# Patient Record
Sex: FEMALE | Race: WHITE | HISPANIC OR LATINO | URBAN - METROPOLITAN AREA
[De-identification: names, ages, dates, MRNs, and addresses within clinical notes are randomized per-mention and may not be internally consistent; named-entity substitution may affect disease eponyms.]

---

## 2017-05-24 ENCOUNTER — INPATIENT (INPATIENT)
Facility: HOSPITAL | Age: 37
LOS: 2 days | Discharge: HOME | DRG: 872 | End: 2017-05-27
Attending: INTERNAL MEDICINE | Admitting: INTERNAL MEDICINE
Payer: SELF-PAY

## 2017-05-24 VITALS
OXYGEN SATURATION: 98 % | HEIGHT: 67 IN | WEIGHT: 244.93 LBS | SYSTOLIC BLOOD PRESSURE: 131 MMHG | TEMPERATURE: 100 F | RESPIRATION RATE: 20 BRPM | DIASTOLIC BLOOD PRESSURE: 79 MMHG | HEART RATE: 116 BPM

## 2017-05-24 DIAGNOSIS — E66.01 MORBID (SEVERE) OBESITY DUE TO EXCESS CALORIES: ICD-10-CM

## 2017-05-24 DIAGNOSIS — N76.0 ACUTE VAGINITIS: ICD-10-CM

## 2017-05-24 DIAGNOSIS — Z29.9 ENCOUNTER FOR PROPHYLACTIC MEASURES, UNSPECIFIED: ICD-10-CM

## 2017-05-24 DIAGNOSIS — Z71.3 DIETARY COUNSELING AND SURVEILLANCE: ICD-10-CM

## 2017-05-24 DIAGNOSIS — A41.9 SEPSIS, UNSPECIFIED ORGANISM: ICD-10-CM

## 2017-05-24 DIAGNOSIS — N39.0 URINARY TRACT INFECTION, SITE NOT SPECIFIED: ICD-10-CM

## 2017-05-24 DIAGNOSIS — Z79.52 LONG TERM (CURRENT) USE OF SYSTEMIC STEROIDS: ICD-10-CM

## 2017-05-24 DIAGNOSIS — F17.210 NICOTINE DEPENDENCE, CIGARETTES, UNCOMPLICATED: ICD-10-CM

## 2017-05-24 DIAGNOSIS — R73.9 HYPERGLYCEMIA, UNSPECIFIED: ICD-10-CM

## 2017-05-24 DIAGNOSIS — I10 ESSENTIAL (PRIMARY) HYPERTENSION: ICD-10-CM

## 2017-05-24 DIAGNOSIS — A41.89 OTHER SPECIFIED SEPSIS: ICD-10-CM

## 2017-05-24 DIAGNOSIS — Z79.899 OTHER LONG TERM (CURRENT) DRUG THERAPY: ICD-10-CM

## 2017-05-24 DIAGNOSIS — R50.9 FEVER, UNSPECIFIED: ICD-10-CM

## 2017-05-24 DIAGNOSIS — E11.65 TYPE 2 DIABETES MELLITUS WITH HYPERGLYCEMIA: ICD-10-CM

## 2017-05-24 DIAGNOSIS — L73.2 HIDRADENITIS SUPPURATIVA: ICD-10-CM

## 2017-05-24 DIAGNOSIS — Z94.0 KIDNEY TRANSPLANT STATUS: ICD-10-CM

## 2017-05-24 DIAGNOSIS — E86.0 DEHYDRATION: ICD-10-CM

## 2017-05-24 DIAGNOSIS — Z71.6 TOBACCO ABUSE COUNSELING: ICD-10-CM

## 2017-05-24 DIAGNOSIS — N17.9 ACUTE KIDNEY FAILURE, UNSPECIFIED: ICD-10-CM

## 2017-05-24 DIAGNOSIS — F41.9 ANXIETY DISORDER, UNSPECIFIED: ICD-10-CM

## 2017-05-24 DIAGNOSIS — L03.111 CELLULITIS OF RIGHT AXILLA: ICD-10-CM

## 2017-05-24 LAB
ALBUMIN SERPL ELPH-MCNC: 2.8 G/DL — LOW (ref 3.5–5)
ALP SERPL-CCNC: 115 U/L — SIGNIFICANT CHANGE UP (ref 40–120)
ALT FLD-CCNC: 19 U/L DA — SIGNIFICANT CHANGE UP (ref 10–60)
ANION GAP SERPL CALC-SCNC: 9 MMOL/L — SIGNIFICANT CHANGE UP (ref 5–17)
APPEARANCE UR: CLEAR — SIGNIFICANT CHANGE UP
AST SERPL-CCNC: 10 U/L — SIGNIFICANT CHANGE UP (ref 10–40)
BILIRUB SERPL-MCNC: 0.4 MG/DL — SIGNIFICANT CHANGE UP (ref 0.2–1.2)
BILIRUB UR-MCNC: NEGATIVE — SIGNIFICANT CHANGE UP
BUN SERPL-MCNC: 9 MG/DL — SIGNIFICANT CHANGE UP (ref 7–18)
CALCIUM SERPL-MCNC: 9 MG/DL — SIGNIFICANT CHANGE UP (ref 8.4–10.5)
CHLORIDE SERPL-SCNC: 101 MMOL/L — SIGNIFICANT CHANGE UP (ref 96–108)
CO2 SERPL-SCNC: 26 MMOL/L — SIGNIFICANT CHANGE UP (ref 22–31)
COLOR SPEC: YELLOW — SIGNIFICANT CHANGE UP
CREAT SERPL-MCNC: 1.5 MG/DL — HIGH (ref 0.5–1.3)
DIFF PNL FLD: ABNORMAL
GLUCOSE SERPL-MCNC: 455 MG/DL — CRITICAL HIGH (ref 70–99)
GLUCOSE UR QL: 1000 MG/DL
HCG UR QL: NEGATIVE — SIGNIFICANT CHANGE UP
HCT VFR BLD CALC: 42.6 % — SIGNIFICANT CHANGE UP (ref 34.5–45)
HGB BLD-MCNC: 13.5 G/DL — SIGNIFICANT CHANGE UP (ref 11.5–15.5)
KETONES UR-MCNC: NEGATIVE — SIGNIFICANT CHANGE UP
LEUKOCYTE ESTERASE UR-ACNC: ABNORMAL
MCHC RBC-ENTMCNC: 27.8 PG — SIGNIFICANT CHANGE UP (ref 27–34)
MCHC RBC-ENTMCNC: 31.6 GM/DL — LOW (ref 32–36)
MCV RBC AUTO: 87.8 FL — SIGNIFICANT CHANGE UP (ref 80–100)
NITRITE UR-MCNC: NEGATIVE — SIGNIFICANT CHANGE UP
PH UR: 6 — SIGNIFICANT CHANGE UP (ref 5–8)
PLATELET # BLD AUTO: 286 K/UL — SIGNIFICANT CHANGE UP (ref 150–400)
POTASSIUM SERPL-MCNC: 3.7 MMOL/L — SIGNIFICANT CHANGE UP (ref 3.5–5.3)
POTASSIUM SERPL-SCNC: 3.7 MMOL/L — SIGNIFICANT CHANGE UP (ref 3.5–5.3)
PROT SERPL-MCNC: 7.9 G/DL — SIGNIFICANT CHANGE UP (ref 6–8.3)
PROT UR-MCNC: 30 MG/DL
RBC # BLD: 4.85 M/UL — SIGNIFICANT CHANGE UP (ref 3.8–5.2)
RBC # FLD: 12.5 % — SIGNIFICANT CHANGE UP (ref 10.3–14.5)
SODIUM SERPL-SCNC: 136 MMOL/L — SIGNIFICANT CHANGE UP (ref 135–145)
SP GR SPEC: 1.01 — SIGNIFICANT CHANGE UP (ref 1.01–1.02)
UROBILINOGEN FLD QL: NEGATIVE — SIGNIFICANT CHANGE UP
WBC # BLD: 14.5 K/UL — HIGH (ref 3.8–10.5)
WBC # FLD AUTO: 14.5 K/UL — HIGH (ref 3.8–10.5)

## 2017-05-24 PROCEDURE — 71020: CPT | Mod: 26

## 2017-05-24 PROCEDURE — 99285 EMERGENCY DEPT VISIT HI MDM: CPT

## 2017-05-24 RX ORDER — SODIUM CHLORIDE 9 MG/ML
1000 INJECTION INTRAMUSCULAR; INTRAVENOUS; SUBCUTANEOUS
Qty: 0 | Refills: 0 | Status: DISCONTINUED | OUTPATIENT
Start: 2017-05-24 | End: 2017-05-27

## 2017-05-24 RX ORDER — CEFTRIAXONE 500 MG/1
1 INJECTION, POWDER, FOR SOLUTION INTRAMUSCULAR; INTRAVENOUS ONCE
Qty: 0 | Refills: 0 | Status: COMPLETED | OUTPATIENT
Start: 2017-05-24 | End: 2017-05-24

## 2017-05-24 RX ORDER — TACROLIMUS 5 MG/1
3 CAPSULE ORAL EVERY 12 HOURS
Qty: 0 | Refills: 0 | Status: DISCONTINUED | OUTPATIENT
Start: 2017-05-24 | End: 2017-05-27

## 2017-05-24 RX ORDER — SODIUM CHLORIDE 9 MG/ML
1000 INJECTION INTRAMUSCULAR; INTRAVENOUS; SUBCUTANEOUS ONCE
Qty: 0 | Refills: 0 | Status: COMPLETED | OUTPATIENT
Start: 2017-05-24 | End: 2017-05-24

## 2017-05-24 RX ORDER — DEXTROSE 50 % IN WATER 50 %
25 SYRINGE (ML) INTRAVENOUS ONCE
Qty: 0 | Refills: 0 | Status: DISCONTINUED | OUTPATIENT
Start: 2017-05-24 | End: 2017-05-27

## 2017-05-24 RX ORDER — GLUCAGON INJECTION, SOLUTION 0.5 MG/.1ML
1 INJECTION, SOLUTION SUBCUTANEOUS ONCE
Qty: 0 | Refills: 0 | Status: DISCONTINUED | OUTPATIENT
Start: 2017-05-24 | End: 2017-05-27

## 2017-05-24 RX ORDER — KETOROLAC TROMETHAMINE 30 MG/ML
30 SYRINGE (ML) INJECTION ONCE
Qty: 0 | Refills: 0 | Status: DISCONTINUED | OUTPATIENT
Start: 2017-05-24 | End: 2017-05-24

## 2017-05-24 RX ORDER — METOCLOPRAMIDE HCL 10 MG
10 TABLET ORAL ONCE
Qty: 0 | Refills: 0 | Status: COMPLETED | OUTPATIENT
Start: 2017-05-24 | End: 2017-05-24

## 2017-05-24 RX ORDER — SODIUM CHLORIDE 9 MG/ML
1000 INJECTION, SOLUTION INTRAVENOUS
Qty: 0 | Refills: 0 | Status: DISCONTINUED | OUTPATIENT
Start: 2017-05-24 | End: 2017-05-27

## 2017-05-24 RX ORDER — DEXTROSE 50 % IN WATER 50 %
1 SYRINGE (ML) INTRAVENOUS ONCE
Qty: 0 | Refills: 0 | Status: DISCONTINUED | OUTPATIENT
Start: 2017-05-24 | End: 2017-05-27

## 2017-05-24 RX ORDER — CEFTRIAXONE 500 MG/1
1 INJECTION, POWDER, FOR SOLUTION INTRAMUSCULAR; INTRAVENOUS EVERY 24 HOURS
Qty: 0 | Refills: 0 | Status: DISCONTINUED | OUTPATIENT
Start: 2017-05-24 | End: 2017-05-27

## 2017-05-24 RX ORDER — DEXTROSE 50 % IN WATER 50 %
12.5 SYRINGE (ML) INTRAVENOUS ONCE
Qty: 0 | Refills: 0 | Status: DISCONTINUED | OUTPATIENT
Start: 2017-05-24 | End: 2017-05-27

## 2017-05-24 RX ORDER — INSULIN HUMAN 100 [IU]/ML
5 INJECTION, SOLUTION SUBCUTANEOUS ONCE
Qty: 0 | Refills: 0 | Status: COMPLETED | OUTPATIENT
Start: 2017-05-24 | End: 2017-05-24

## 2017-05-24 RX ORDER — INSULIN LISPRO 100/ML
VIAL (ML) SUBCUTANEOUS
Qty: 0 | Refills: 0 | Status: DISCONTINUED | OUTPATIENT
Start: 2017-05-24 | End: 2017-05-27

## 2017-05-24 RX ADMIN — Medication 10 MILLIGRAM(S): at 21:54

## 2017-05-24 RX ADMIN — Medication 30 MILLIGRAM(S): at 20:20

## 2017-05-24 RX ADMIN — CEFTRIAXONE 100 GRAM(S): 500 INJECTION, POWDER, FOR SOLUTION INTRAMUSCULAR; INTRAVENOUS at 21:55

## 2017-05-24 RX ADMIN — SODIUM CHLORIDE 4000 MILLILITER(S): 9 INJECTION INTRAMUSCULAR; INTRAVENOUS; SUBCUTANEOUS at 20:20

## 2017-05-24 RX ADMIN — SODIUM CHLORIDE 4000 MILLILITER(S): 9 INJECTION INTRAMUSCULAR; INTRAVENOUS; SUBCUTANEOUS at 21:55

## 2017-05-24 RX ADMIN — INSULIN HUMAN 5 UNIT(S): 100 INJECTION, SOLUTION SUBCUTANEOUS at 22:01

## 2017-05-24 NOTE — H&P ADULT - ATTENDING COMMENTS
Patient seen/evaluated at bedside. I agree with the resident progress note/outlined plan of care. My independent findings and conclusions are documented.

## 2017-05-24 NOTE — H&P ADULT - NEGATIVE GENERAL GENITOURINARY SYMPTOMS
no flank pain L/no incontinence/no hematuria/no renal colic/no nocturia/no bladder infections/no urine discoloration/no flank pain R/no gas in urine/no urinary hesitancy/normal urinary frequency

## 2017-05-24 NOTE — ED ADULT TRIAGE NOTE - CHIEF COMPLAINT QUOTE
pt c/o having a fever that started monday night, and pt thinks she has a uti and bodyache, headache. Pt has history of right kidney transplant.

## 2017-05-24 NOTE — ED PROVIDER NOTE - CONDUCTED A DETAILED DISCUSSION WITH PATIENT AND/OR GUARDIAN REGARDING, MDM
return to ED if symptoms worsen, persist or questions arise lab results/radiology results/need to admit

## 2017-05-24 NOTE — ED PROVIDER NOTE - MEDICAL DECISION MAKING DETAILS
kidney transplant in 1999, uti today, new onset diabetes likely steroid induced, dehydration, kristin cr of 1.5 from baseline normal, no pmd to follow up with, will admit for abx and further mgmt. gave cftx for uti, 2L NS, toradol for body aches. pt feels better. agrees with admission

## 2017-05-24 NOTE — ED PROVIDER NOTE - OBJECTIVE STATEMENT
37 y/o F w/ PMHx of Kidney Transplant (Sept 20th, 1999) presents to the ED c/o constant fever x3 days. Pt also notes intermittent HA for several months. Pt states she has recently switched jobs & has had to change insurance providers & so was unable to obtain High Blood Pressure meds & has stopped taking her meds. Pt is on Nifedipine 60 mg, Prograf 0.1 mg 3x per day, & Prednisone 5m 1x per day. Pt denies nausea, vomiting, diarrhea or any other complaints. Pharmacy: [facility], [id number]. NKDA. 35 y/o F w/ PMHx of Kidney Transplant (Sept 20th, 1999), had kidney scarring diagnosed at age 13, presents to the ED c/o constant fever x3 days, dysuria and frequency. Pt also notes intermittent HA for several months. Pt states she has recently switched jobs & has had to change insurance providers & so was unable to obtain Blood Pressure meds since one week ago. Pt is on Nifedipine 60 mg. She has her other meds which include Prograf 0.1 mg 3x per day, & Prednisone 5m 1x per day. Pt denies nausea, vomiting, diarrhea or sob. Pharmacy: [facility], [id number]. NKDA. She is menstruating now so gave heads up that there might be blood in urine. 37 y/o F w/ PMHx of Kidney Transplant (Sept 20th, 1999), had kidney scarring diagnosed at age 13, presents to the ED c/o constant fever x3 days, dysuria and frequency. Pt also notes intermittent HA for several months. Pt states she has recently switched jobs & has had to change insurance providers & so was unable to obtain Blood Pressure meds since one week ago. Pt is on Nifedipine 60 mg. She has her other meds which include Prograf 1 mg 3x per day, & Prednisone 5m 1x per day. Pt denies nausea, vomiting, diarrhea or sob. Pharmacy: [facility], [id number]. NKDA. She is menstruating now so gave heads up that there might be blood in urine.

## 2017-05-24 NOTE — H&P ADULT - HISTORY OF PRESENT ILLNESS
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 patient states she was in her usual state of health 3 days ago when she noticed chills , she took her temperture and it was noted to be 103 , she took some tylenol , but the fever persisted for the nest 2 dyas ranging from 101-103 F . Patient also reports increased frequency of micturation and burning when she urinates for the past 3 days , denies any flank pain or foul smelling urine . Also reports intermittent frontal headaches for past 3 months , she states its " migraine" from the stress of changing jobs . Pt denies nausea, vomiting, diarrhea or sob. ROS is negative excpet above.    In the ER patient's VSS t 99.8 , hr 116 , bp 131/69 , rr 20 pulse ox of 98 , patient was found to have positive ua . wbc count of 14.5 , BUN/cr 9/1.55 , bsl of 455 ( no anion gap) got 2 liet bolus and 5 units insulin with repeat bsl of 165 , patient also got one dose of rocephin in the ER.

## 2017-05-24 NOTE — H&P ADULT - ASSESSMENT
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

## 2017-05-24 NOTE — ED PROVIDER NOTE - NS ED MD SCRIBE ATTENDING SCRIBE SECTIONS
HISTORY OF PRESENT ILLNESS/PHYSICAL EXAM/HIV/VITAL SIGNS( Pullset)/REVIEW OF SYSTEMS/DISPOSITION/PAST MEDICAL/SURGICAL/SOCIAL HISTORY

## 2017-05-24 NOTE — H&P ADULT - PROBLEM SELECTOR PLAN 2
elevated BSl  : 450 S/P 2 Lite bolus and 5 units of insulin repeat 165  c/w iss diabetic diet  f/u hemoglobin A1c

## 2017-05-25 DIAGNOSIS — N39.0 URINARY TRACT INFECTION, SITE NOT SPECIFIED: ICD-10-CM

## 2017-05-25 DIAGNOSIS — L73.2 HIDRADENITIS SUPPURATIVA: ICD-10-CM

## 2017-05-25 DIAGNOSIS — E11.9 TYPE 2 DIABETES MELLITUS WITHOUT COMPLICATIONS: ICD-10-CM

## 2017-05-25 LAB
24R-OH-CALCIDIOL SERPL-MCNC: 5.7 NG/ML — LOW (ref 30–100)
ALBUMIN SERPL ELPH-MCNC: 2.3 G/DL — LOW (ref 3.5–5)
ALP SERPL-CCNC: 96 U/L — SIGNIFICANT CHANGE UP (ref 40–120)
ALT FLD-CCNC: 17 U/L DA — SIGNIFICANT CHANGE UP (ref 10–60)
ANION GAP SERPL CALC-SCNC: 6 MMOL/L — SIGNIFICANT CHANGE UP (ref 5–17)
AST SERPL-CCNC: 10 U/L — SIGNIFICANT CHANGE UP (ref 10–40)
BASOPHILS # BLD AUTO: 0.1 K/UL — SIGNIFICANT CHANGE UP (ref 0–0.2)
BASOPHILS NFR BLD AUTO: 0.9 % — SIGNIFICANT CHANGE UP (ref 0–2)
BILIRUB SERPL-MCNC: 0.4 MG/DL — SIGNIFICANT CHANGE UP (ref 0.2–1.2)
BUN SERPL-MCNC: 6 MG/DL — LOW (ref 7–18)
CALCIUM SERPL-MCNC: 8.3 MG/DL — LOW (ref 8.4–10.5)
CHLORIDE SERPL-SCNC: 109 MMOL/L — HIGH (ref 96–108)
CHOLEST SERPL-MCNC: 180 MG/DL — SIGNIFICANT CHANGE UP (ref 10–199)
CO2 SERPL-SCNC: 25 MMOL/L — SIGNIFICANT CHANGE UP (ref 22–31)
CREAT SERPL-MCNC: 1.08 MG/DL — SIGNIFICANT CHANGE UP (ref 0.5–1.3)
EOSINOPHIL # BLD AUTO: 0.2 K/UL — SIGNIFICANT CHANGE UP (ref 0–0.5)
EOSINOPHIL NFR BLD AUTO: 2 % — SIGNIFICANT CHANGE UP (ref 0–6)
GLUCOSE SERPL-MCNC: 266 MG/DL — HIGH (ref 70–99)
HBA1C BLD-MCNC: 13.2 % — HIGH (ref 4–5.6)
HCT VFR BLD CALC: 37.6 % — SIGNIFICANT CHANGE UP (ref 34.5–45)
HDLC SERPL-MCNC: 42 MG/DL — SIGNIFICANT CHANGE UP (ref 40–125)
HGB BLD-MCNC: 12.1 G/DL — SIGNIFICANT CHANGE UP (ref 11.5–15.5)
LACTATE SERPL-SCNC: 1.5 MMOL/L — SIGNIFICANT CHANGE UP (ref 0.7–2)
LIPID PNL WITH DIRECT LDL SERPL: 102 MG/DL — SIGNIFICANT CHANGE UP
LYMPHOCYTES # BLD AUTO: 2.8 K/UL — SIGNIFICANT CHANGE UP (ref 1–3.3)
LYMPHOCYTES # BLD AUTO: 23.3 % — SIGNIFICANT CHANGE UP (ref 13–44)
MAGNESIUM SERPL-MCNC: 1.5 MG/DL — LOW (ref 1.6–2.6)
MCHC RBC-ENTMCNC: 28 PG — SIGNIFICANT CHANGE UP (ref 27–34)
MCHC RBC-ENTMCNC: 32.2 GM/DL — SIGNIFICANT CHANGE UP (ref 32–36)
MCV RBC AUTO: 87 FL — SIGNIFICANT CHANGE UP (ref 80–100)
MONOCYTES # BLD AUTO: 0.7 K/UL — SIGNIFICANT CHANGE UP (ref 0–0.9)
MONOCYTES NFR BLD AUTO: 6.1 % — SIGNIFICANT CHANGE UP (ref 2–14)
NEUTROPHILS # BLD AUTO: 8.3 K/UL — HIGH (ref 1.8–7.4)
NEUTROPHILS NFR BLD AUTO: 67.8 % — SIGNIFICANT CHANGE UP (ref 43–77)
PHOSPHATE SERPL-MCNC: 2.2 MG/DL — LOW (ref 2.5–4.5)
PLATELET # BLD AUTO: 246 K/UL — SIGNIFICANT CHANGE UP (ref 150–400)
POTASSIUM SERPL-MCNC: 3.6 MMOL/L — SIGNIFICANT CHANGE UP (ref 3.5–5.3)
POTASSIUM SERPL-SCNC: 3.6 MMOL/L — SIGNIFICANT CHANGE UP (ref 3.5–5.3)
PROCALCITONIN SERPL-MCNC: 0.2 NG/ML — HIGH (ref 0–0.05)
PROT SERPL-MCNC: 6.8 G/DL — SIGNIFICANT CHANGE UP (ref 6–8.3)
RBC # BLD: 4.33 M/UL — SIGNIFICANT CHANGE UP (ref 3.8–5.2)
RBC # FLD: 12 % — SIGNIFICANT CHANGE UP (ref 10.3–14.5)
SODIUM SERPL-SCNC: 140 MMOL/L — SIGNIFICANT CHANGE UP (ref 135–145)
TACROLIMUS SERPL-MCNC: 3.3 NG/ML — SIGNIFICANT CHANGE UP
TOTAL CHOLESTEROL/HDL RATIO MEASUREMENT: 4.3 RATIO — SIGNIFICANT CHANGE UP (ref 3.3–7.1)
TRIGL SERPL-MCNC: 180 MG/DL — HIGH (ref 10–149)
TSH SERPL-MCNC: 0.9 UU/ML — SIGNIFICANT CHANGE UP (ref 0.34–4.82)
WBC # BLD: 12.2 K/UL — HIGH (ref 3.8–10.5)
WBC # FLD AUTO: 12.2 K/UL — HIGH (ref 3.8–10.5)

## 2017-05-25 PROCEDURE — 99223 1ST HOSP IP/OBS HIGH 75: CPT | Mod: GC

## 2017-05-25 RX ORDER — INSULIN GLARGINE 100 [IU]/ML
25 INJECTION, SOLUTION SUBCUTANEOUS AT BEDTIME
Qty: 0 | Refills: 0 | Status: DISCONTINUED | OUTPATIENT
Start: 2017-05-25 | End: 2017-05-26

## 2017-05-25 RX ORDER — TRAMADOL HYDROCHLORIDE 50 MG/1
50 TABLET ORAL ONCE
Qty: 0 | Refills: 0 | Status: DISCONTINUED | OUTPATIENT
Start: 2017-05-25 | End: 2017-05-25

## 2017-05-25 RX ORDER — INSULIN LISPRO 100/ML
7 VIAL (ML) SUBCUTANEOUS
Qty: 0 | Refills: 0 | Status: DISCONTINUED | OUTPATIENT
Start: 2017-05-25 | End: 2017-05-26

## 2017-05-25 RX ORDER — TRAMADOL HYDROCHLORIDE 50 MG/1
25 TABLET ORAL ONCE
Qty: 0 | Refills: 0 | Status: DISCONTINUED | OUTPATIENT
Start: 2017-05-25 | End: 2017-05-25

## 2017-05-25 RX ORDER — ACETAMINOPHEN 500 MG
650 TABLET ORAL EVERY 6 HOURS
Qty: 0 | Refills: 0 | Status: DISCONTINUED | OUTPATIENT
Start: 2017-05-25 | End: 2017-05-27

## 2017-05-25 RX ORDER — SODIUM,POTASSIUM PHOSPHATES 278-250MG
1 POWDER IN PACKET (EA) ORAL
Qty: 0 | Refills: 0 | Status: COMPLETED | OUTPATIENT
Start: 2017-05-25 | End: 2017-05-25

## 2017-05-25 RX ORDER — KETOROLAC TROMETHAMINE 30 MG/ML
30 SYRINGE (ML) INJECTION ONCE
Qty: 0 | Refills: 0 | Status: DISCONTINUED | OUTPATIENT
Start: 2017-05-25 | End: 2017-05-25

## 2017-05-25 RX ORDER — MAGNESIUM SULFATE 500 MG/ML
1 VIAL (ML) INJECTION ONCE
Qty: 0 | Refills: 0 | Status: COMPLETED | OUTPATIENT
Start: 2017-05-25 | End: 2017-05-25

## 2017-05-25 RX ADMIN — Medication 100 GRAM(S): at 08:28

## 2017-05-25 RX ADMIN — INSULIN GLARGINE 25 UNIT(S): 100 INJECTION, SOLUTION SUBCUTANEOUS at 22:24

## 2017-05-25 RX ADMIN — Medication 650 MILLIGRAM(S): at 17:15

## 2017-05-25 RX ADMIN — CEFTRIAXONE 100 GRAM(S): 500 INJECTION, POWDER, FOR SOLUTION INTRAMUSCULAR; INTRAVENOUS at 22:24

## 2017-05-25 RX ADMIN — TACROLIMUS 3 MILLIGRAM(S): 5 CAPSULE ORAL at 06:11

## 2017-05-25 RX ADMIN — Medication 5: at 12:21

## 2017-05-25 RX ADMIN — Medication 7 UNIT(S): at 17:05

## 2017-05-25 RX ADMIN — TACROLIMUS 3 MILLIGRAM(S): 5 CAPSULE ORAL at 17:05

## 2017-05-25 RX ADMIN — Medication 2: at 08:29

## 2017-05-25 RX ADMIN — Medication 30 MILLIGRAM(S): at 05:10

## 2017-05-25 RX ADMIN — Medication 1 TABLET(S): at 12:22

## 2017-05-25 RX ADMIN — Medication 30 MILLIGRAM(S): at 04:49

## 2017-05-25 RX ADMIN — SODIUM CHLORIDE 100 MILLILITER(S): 9 INJECTION INTRAMUSCULAR; INTRAVENOUS; SUBCUTANEOUS at 00:11

## 2017-05-25 RX ADMIN — Medication 1 TABLET(S): at 08:28

## 2017-05-25 RX ADMIN — Medication 3: at 17:05

## 2017-05-25 RX ADMIN — Medication 650 MILLIGRAM(S): at 18:56

## 2017-05-25 RX ADMIN — Medication 5 MILLIGRAM(S): at 06:11

## 2017-05-25 NOTE — PROGRESS NOTE ADULT - PROBLEM SELECTOR PLAN 4
-holding home med nifedipine, monitor BP and restart medications when pt's condition improves. -c/w tacrolimus and prednisone  -f/u tacrolimus level in AM before the morning dose.  -Cr trending down, c/w IV fluid.

## 2017-05-25 NOTE — PROGRESS NOTE ADULT - PROBLEM SELECTOR PLAN 1
-UA positive, Check urine culture and blood culture, Lactate and procalcitonin.  -Continue with Rocephin 1g IV 24hrs. #2.

## 2017-05-25 NOTE — PROGRESS NOTE ADULT - SUBJECTIVE AND OBJECTIVE BOX
Patient is a 36y old Female who presents with a chief complaint of fever and chills for 3 days.    INTERVAL HPI/OVERNIGHT EVENTS: Afebrile since admission however patient received one dose of Ketorolac.    T(C): 37, Max: 37.7 ( @ 19:16)  HR: 88 (85 - 116)  BP: 115/76 (114/70 - 131/79)  RR: 16 (16 - 20)  SpO2: 97% (96% - 100%)    LABS:                        12.1   12.2  )-----------( 246      ( 25 May 2017 05:46 )             37.6     05-25    140  |  109<H>  |  6<L>  ----------------------------<  266<H>  3.6   |  25  |  1.08    Ca    8.3<L>      25 May 2017 05:46  Phos  2.2       Mg     1.5         TPro  6.8  /  Alb  2.3<L>  /  TBili  0.4  /  DBili  x   /  AST  10  /  ALT  17  /  AlkPhos  96  05-25      Urinalysis Basic - ( 24 May 2017 20:06 )    Color: Yellow / Appearance: Clear / S.010 / pH: x  Gluc: x / Ketone: Negative  / Bili: Negative / Urobili: Negative   Blood: x / Protein: 30 mg/dL / Nitrite: Negative   Leuk Esterase: Moderate / RBC: 25-50 /HPF / WBC 11-25 /HPF   Sq Epi: x / Non Sq Epi: Few / Bacteria: Moderate /HPF      CAPILLARY BLOOD GLUCOSE  243 (25 May 2017 08:30)  165 (24 May 2017 22:58)    LIVER FUNCTIONS - ( 25 May 2017 05:46 )  Alb: 2.3 g/dL / Pro: 6.8 g/dL / ALK PHOS: 96 U/L / ALT: 17 U/L DA / AST: 10 U/L / GGT: x                   MEDICATIONS  (STANDING):  insulin lispro (HumaLOG) corrective regimen sliding scale  SubCutaneous three times a day before meals  sodium chloride 0.9%. 1000milliLiter(s) IV Continuous <Continuous>  cefTRIAXone   IVPB 1Gram(s) IV Intermittent every 24 hours day#2  tacrolimus 3milliGRAM(s) Oral every 12 hours  predniSONE   Tablet 5milliGRAM(s) Oral daily  potassium acid phosphate/sodium acid phosphate tablet (K-PHOS No. 2) 1Tablet(s) Oral four times a day with meals    MEDICATIONS  (PRN):  dextrose Gel 1Dose(s) Oral once PRN Blood Glucose LESS THAN 70 milliGRAM(s)/deciLiter  glucagon  Injectable 1milliGRAM(s) IntraMuscular once PRN Glucose <70 milliGRAM(s)/deciLiter      RADIOLOGY & ADDITIONAL TESTS:  EXAM:  CHEST PA & LAT                            PROCEDURE DATE:  2017        INTERPRETATION:  cough    PA and lateral radiographs of the chest demonstrate clear lungs.      The costophrenic angles are clear.      The heart size and vascular markings are unremarkable.     No hilar or mediastinal abnormality is noted.     The osseous structures appear intact.     IMPRESSION:  Clear lungs.No acute airspace disease suggested.    Imaging Personally Reviewed:  [x ] YES  [ ] NO    Consultant(s) Notes Reviewed:  [x ] YES  [ ] NO    REVIEW OF SYSTEMS:  CONSTITUTIONAL: No fever  EYES: No eye pain, visual disturbances, or discharge  ENMT:  No difficulty hearing, tinnitus, vertigo; No sinus or throat pain  NECK: No pain or stiffness  BREASTS: No pain, masses, or nipple discharge  RESPIRATORY: No cough, wheezing, chills or hemoptysis; No shortness of breath  CARDIOVASCULAR: No chest pain, palpitations, dizziness, or leg swelling  GASTROINTESTINAL: No abdominal or epigastric pain. No nausea, vomiting, or hematemesis; No diarrhea or constipation.   GENITOURINARY: increased urinary frequency but no burning sense.  NEUROLOGICAL: No headaches, memory loss, loss of strength, numbness, or tremors  SKIN: No itching, burning, rashes, or lesions   LYMPH NODES: No enlarged glands  ENDOCRINE: No heat or cold intolerance; No hair loss  MUSCULOSKELETAL: No joint pain or swelling; No muscle, back, or extremity pain  PSYCHIATRIC: No depression, anxiety, mood swings, or difficulty sleeping      PHYSICAL EXAM:  GENERAL: NAD  HEAD:  Atraumatic, Normocephalic  EYES: EOMI, PERRLA, conjunctiva and sclera clear  NECK: Supple, No JVD, Normal thyroid  NERVOUS SYSTEM:  Alert & Oriented X3, Good concentration  CHEST/LUNG: Clear to percussion bilaterally; No rales, rhonchi, wheezing, or rubs  HEART: Regular rate and rhythm; No murmurs, rubs, or gallops  ABDOMEN: Soft, Nontender, Nondistended; Bowel sounds present  Muscular: No definite CVAT bilaterally. ?equivocal on the right side.  EXTREMITIES:  2+ Peripheral Pulses bilaterally, No clubbing, cyanosis, or edema  LYMPH: No lymphadenopathy noted  SKIN: No rashes or lesions    Care Discussed with Consultants/Other Providers [x ] YES  [ ] NO Patient is a 36y old Female who presents with a chief complaint of fever and chills for 3 days.    INTERVAL HPI/OVERNIGHT EVENTS: Afebrile since admission however patient received one dose of Ketorolac.    T(C): 37, Max: 37.7 ( @ 19:16)  HR: 88 (85 - 116)  BP: 115/76 (114/70 - 131/79)  RR: 16 (16 - 20)  SpO2: 97% (96% - 100%)    LABS:                        12.1   12.2  )-----------( 246      ( 25 May 2017 05:46 )             37.6     05-25    140  |  109<H>  |  6<L>  ----------------------------<  266<H>  3.6   |  25  |  1.08    Ca    8.3<L>      25 May 2017 05:46  Phos  2.2       Mg     1.5         TPro  6.8  /  Alb  2.3<L>  /  TBili  0.4  /  DBili  x   /  AST  10  /  ALT  17  /  AlkPhos  96  05-25      Urinalysis Basic - ( 24 May 2017 20:06 )    Color: Yellow / Appearance: Clear / S.010 / pH: x  Gluc: x / Ketone: Negative  / Bili: Negative / Urobili: Negative   Blood: x / Protein: 30 mg/dL / Nitrite: Negative   Leuk Esterase: Moderate / RBC: 25-50 /HPF / WBC 11-25 /HPF   Sq Epi: x / Non Sq Epi: Few / Bacteria: Moderate /HPF      CAPILLARY BLOOD GLUCOSE  243 (25 May 2017 08:30)  165 (24 May 2017 22:58)    LIVER FUNCTIONS - ( 25 May 2017 05:46 )  Alb: 2.3 g/dL / Pro: 6.8 g/dL / ALK PHOS: 96 U/L / ALT: 17 U/L DA / AST: 10 U/L / GGT: x                   MEDICATIONS  (STANDING):  insulin lispro (HumaLOG) corrective regimen sliding scale  SubCutaneous three times a day before meals  sodium chloride 0.9%. 1000milliLiter(s) IV Continuous <Continuous>  cefTRIAXone   IVPB 1Gram(s) IV Intermittent every 24 hours day#2  tacrolimus 3milliGRAM(s) Oral every 12 hours  predniSONE   Tablet 5milliGRAM(s) Oral daily  potassium acid phosphate/sodium acid phosphate tablet (K-PHOS No. 2) 1Tablet(s) Oral four times a day with meals    MEDICATIONS  (PRN):  dextrose Gel 1Dose(s) Oral once PRN Blood Glucose LESS THAN 70 milliGRAM(s)/deciLiter  glucagon  Injectable 1milliGRAM(s) IntraMuscular once PRN Glucose <70 milliGRAM(s)/deciLiter      RADIOLOGY & ADDITIONAL TESTS:  EXAM:  CHEST PA & LAT                            PROCEDURE DATE:  2017        INTERPRETATION:  cough    PA and lateral radiographs of the chest demonstrate clear lungs.      The costophrenic angles are clear.      The heart size and vascular markings are unremarkable.     No hilar or mediastinal abnormality is noted.     The osseous structures appear intact.     IMPRESSION:  Clear lungs.No acute airspace disease suggested.    Imaging Personally Reviewed:  [x ] YES  [ ] NO    Consultant(s) Notes Reviewed:  [x ] YES  [ ] NO    REVIEW OF SYSTEMS:  CONSTITUTIONAL: No fever  EYES: No eye pain, visual disturbances, or discharge  ENMT:  No difficulty hearing, tinnitus, vertigo; No sinus or throat pain  NECK: No pain or stiffness  BREASTS: No pain, masses, or nipple discharge  RESPIRATORY: No cough, wheezing, chills or hemoptysis; No shortness of breath  CARDIOVASCULAR: No chest pain, palpitations, dizziness, or leg swelling  GASTROINTESTINAL: No abdominal or epigastric pain. No nausea, vomiting, or hematemesis; No diarrhea or constipation.   GENITOURINARY: increased urinary frequency but no burning sense.  NEUROLOGICAL: No headaches, memory loss, loss of strength, numbness, or tremors  SKIN: No itching, burning, rashes, or lesions   LYMPH NODES: No enlarged glands  ENDOCRINE: No heat or cold intolerance; No hair loss  MUSCULOSKELETAL: No joint pain or swelling; No muscle, back, or extremity pain  PSYCHIATRIC: No depression, anxiety, mood swings, or difficulty sleeping      PHYSICAL EXAM:  GENERAL: NAD  HEAD:  Atraumatic, Normocephalic, negative sinus tenderness --maxillary and frontal.  EYES: EOMI, PERRLA, conjunctiva and sclera clear  NECK: Supple, No JVD, Normal thyroid  NERVOUS SYSTEM:  Alert & Oriented X3, Good concentration  CHEST/LUNG: Clear to percussion bilaterally; No rales, rhonchi, wheezing, or rubs  HEART: Regular rate and rhythm; No murmurs, rubs, or gallops  ABDOMEN: Soft, Nontender, Nondistended; Bowel sounds present  Muscular: No definite CVAT bilaterally. ?equivocal on the right side.  EXTREMITIES:  2+ Peripheral Pulses bilaterally, No clubbing, cyanosis, or edema  LYMPH: No lymphadenopathy noted  SKIN: No rashes or lesions    Care Discussed with Consultants/Other Providers [x ] YES  [ ] NO Patient is a 36y old Female who presents with a chief complaint of fever and chills for 3 days.    INTERVAL HPI/OVERNIGHT EVENTS: Afebrile since admission however patient received one dose of Ketorolac.    T(C): 37, Max: 37.7 ( @ 19:16)  HR: 88 (85 - 116)  BP: 115/76 (114/70 - 131/79)  RR: 16 (16 - 20)  SpO2: 97% (96% - 100%)    LABS:                        12.1   12.2  )-----------( 246      ( 25 May 2017 05:46 )             37.6     05-25    140  |  109<H>  |  6<L>  ----------------------------<  266<H>  3.6   |  25  |  1.08    Ca    8.3<L>      25 May 2017 05:46  Phos  2.2       Mg     1.5         TPro  6.8  /  Alb  2.3<L>  /  TBili  0.4  /  DBili  x   /  AST  10  /  ALT  17  /  AlkPhos  96  05-25      Urinalysis Basic - ( 24 May 2017 20:06 )    Color: Yellow / Appearance: Clear / S.010 / pH: x  Gluc: x / Ketone: Negative  / Bili: Negative / Urobili: Negative   Blood: x / Protein: 30 mg/dL / Nitrite: Negative   Leuk Esterase: Moderate / RBC: 25-50 /HPF / WBC 11-25 /HPF   Sq Epi: x / Non Sq Epi: Few / Bacteria: Moderate /HPF      CAPILLARY BLOOD GLUCOSE  243 (25 May 2017 08:30)  165 (24 May 2017 22:58)    LIVER FUNCTIONS - ( 25 May 2017 05:46 )  Alb: 2.3 g/dL / Pro: 6.8 g/dL / ALK PHOS: 96 U/L / ALT: 17 U/L DA / AST: 10 U/L / GGT: x                   MEDICATIONS  (STANDING):  insulin lispro (HumaLOG) corrective regimen sliding scale  SubCutaneous three times a day before meals  sodium chloride 0.9%. 1000milliLiter(s) IV Continuous <Continuous>  cefTRIAXone   IVPB 1Gram(s) IV Intermittent every 24 hours day#2  tacrolimus 3milliGRAM(s) Oral every 12 hours  predniSONE   Tablet 5milliGRAM(s) Oral daily  potassium acid phosphate/sodium acid phosphate tablet (K-PHOS No. 2) 1Tablet(s) Oral four times a day with meals    MEDICATIONS  (PRN):  dextrose Gel 1Dose(s) Oral once PRN Blood Glucose LESS THAN 70 milliGRAM(s)/deciLiter  glucagon  Injectable 1milliGRAM(s) IntraMuscular once PRN Glucose <70 milliGRAM(s)/deciLiter      RADIOLOGY & ADDITIONAL TESTS:  EXAM:  CHEST PA & LAT                            PROCEDURE DATE:  2017        INTERPRETATION:  cough    PA and lateral radiographs of the chest demonstrate clear lungs.      The costophrenic angles are clear.      The heart size and vascular markings are unremarkable.     No hilar or mediastinal abnormality is noted.     The osseous structures appear intact.     IMPRESSION:  Clear lungs.No acute airspace disease suggested.    Imaging Personally Reviewed:  [x ] YES  [ ] NO    Consultant(s) Notes Reviewed:  [x ] YES  [ ] NO    REVIEW OF SYSTEMS:  CONSTITUTIONAL: No fever  EYES: No eye pain, visual disturbances, or discharge  ENMT:  No difficulty hearing, tinnitus, vertigo; No sinus or throat pain  NECK: No pain or stiffness  BREASTS: No pain, masses, or nipple discharge  RESPIRATORY: No cough, wheezing, chills or hemoptysis; No shortness of breath  CARDIOVASCULAR: No chest pain, palpitations, dizziness, or leg swelling  GASTROINTESTINAL: No abdominal or epigastric pain. No nausea, vomiting, or hematemesis; No diarrhea or constipation.   GENITOURINARY: increased urinary frequency but no burning sense.  NEUROLOGICAL: No headaches, memory loss, loss of strength, numbness, or tremors  SKIN: No itching, burning, rashes, or lesions   LYMPH NODES: No enlarged glands  ENDOCRINE: No heat or cold intolerance; No hair loss  MUSCULOSKELETAL: No joint pain or swelling; No muscle, back, or extremity pain  PSYCHIATRIC: No depression, anxiety, mood swings, or difficulty sleeping      PHYSICAL EXAM:  GENERAL: NAD  HEAD:  Atraumatic, Normocephalic, negative sinus tenderness --maxillary and frontal.  EYES: EOMI, PERRLA, conjunctiva and sclera clear  NECK: Supple, No JVD, Normal thyroid  NERVOUS SYSTEM:  Alert & Oriented X3, Good concentration  CHEST/LUNG: Clear to percussion bilaterally; No rales, rhonchi, wheezing, or rubs  HEART: Regular rate and rhythm; No murmurs, rubs, or gallops  ABDOMEN: Soft, Nontender, Nondistended; Bowel sounds present  Muscular: No definite CVAT bilaterally. ?equivocal on the right side.  EXTREMITIES:  2+ Peripheral Pulses bilaterally, No clubbing, cyanosis, or edema  LYMPH: No lymphadenopathy noted  SKIN: right axillary pus with suppurative follicles.    Care Discussed with Consultants/Other Providers [x ] YES  [ ] NO

## 2017-05-25 NOTE — CONSULT NOTE ADULT - PROBLEM SELECTOR RECOMMENDATION 9
Medical management:  con't abx  con't immunosuppresants  warm compress  good hygiene encouraged  DM control  weight loss  outpatient f/u w/ Dr. Ochoa for elective wide excision

## 2017-05-25 NOTE — CONSULT NOTE ADULT - SUBJECTIVE AND OBJECTIVE BOX
Patient is a 36y old  Female who presents with a chief complaint of fever and chills     Called see and eval 36y y.o. 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 Pt admitted 18 for fever and chills for 3 days. Pt states she has been feeling ill for the past week but due to her new job with [employer] she has been ignoring her symptoms. Associated with burning sensation during urination, worse than previous episode (). During admission examination, a small pimple was noted to be draining in her right axilla. Pt states she noticed it  but it was not draining at the time. She has had a similar pimple in her right axilla a couple of years ago after shaving, but never pursued medical attention and it resolved on its own. Currently feeling better, denies axillary pain, numbness/tingling UE. Pt newly dx with DM, with an A1c of 13 and serum glucose on 455 on admission.     PAST MEDICAL & SURGICAL HISTORY:  Kidney fibrosis s/p transplant  HTN  DM    MEDICATIONS  (STANDING):  insulin lispro (HumaLOG) corrective regimen sliding scale  SubCutaneous three times a day before meals  dextrose 5%. 1000milliLiter(s) IV Continuous <Continuous>  dextrose 50% Injectable 12.5Gram(s) IV Push once  dextrose 50% Injectable 25Gram(s) IV Push once  dextrose 50% Injectable 25Gram(s) IV Push once  sodium chloride 0.9%. 1000milliLiter(s) IV Continuous <Continuous>  cefTRIAXone   IVPB 1Gram(s) IV Intermittent every 24 hours  tacrolimus 3milliGRAM(s) Oral every 12 hours  predniSONE   Tablet 5milliGRAM(s) Oral daily  insulin glargine Injectable (LANTUS) 25Unit(s) SubCutaneous at bedtime  insulin lispro Injectable (HumaLOG) 7Unit(s) SubCutaneous three times a day before meals    MEDICATIONS  (PRN):  dextrose Gel 1Dose(s) Oral once PRN Blood Glucose LESS THAN 70 milliGRAM(s)/deciLiter  glucagon  Injectable 1milliGRAM(s) IntraMuscular once PRN Glucose <70 milliGRAM(s)/deciLiter      Allergies    No Known Allergies    Intolerances        Vital Signs Last 24 Hrs  T(C): 36.8, Max: 37.7 ( @ 19:16)  T(F): 98.3, Max: 99.8 ( @ 19:16)  HR: 94 (85 - 116)  BP: 113/68 (113/68 - 131/79)  BP(mean): --  RR: 17 (16 - 20)  SpO2: 100% (96% - 100%)    Physical:  Gen: A&Ox3. NAD  RUE: Warm, NVI, distal pulses 2+, right axilla with 1cm pustule with surrounding erythema. Draining purulent fluid on palpation. Underlying induration extending laterally. Sinus tracts seen laterally from pustule. Nontender.  LUE: No sinus tracts or papules/pustules seen.    LABS:                        12.1   12.2  )-----------( 246      ( 25 May 2017 05:46 )             37.6              05-25    140  |  109<H>  |  6<L>  ----------------------------<  266<H>  3.6   |  25  |  1.08    Ca    8.3<L>      25 May 2017 05:46  Phos  2.2     05-25  Mg     1.5     05-25    TPro  6.8  /  Alb  2.3<L>  /  TBili  0.4  /  DBili  x   /  AST  10  /  ALT  17  /  AlkPhos  96  05-25              Urinalysis Basic - ( 24 May 2017 20:06 )    Color: Yellow / Appearance: Clear / S.010 / pH: x  Gluc: x / Ketone: Negative  / Bili: Negative / Urobili: Negative   Blood: x / Protein: 30 mg/dL / Nitrite: Negative   Leuk Esterase: Moderate / RBC: 25-50 /HPF / WBC 11-25 /HPF   Sq Epi: x / Non Sq Epi: Few / Bacteria: Moderate /HPF

## 2017-05-25 NOTE — PROGRESS NOTE ADULT - PROBLEM SELECTOR PLAN 3
-c/w tacrolimus and prednisone  -f/u tacrolimus level in AM before the morning dose.  -Cr trending down, c/w IV fluid. -New onset, Hemoglobin A1c 13.2.  -Monitor blood glucose. Consider endocrine consult.  -Will start Insulins  -Consistent carbohydrate diet.

## 2017-05-25 NOTE — PROGRESS NOTE ADULT - PROBLEM SELECTOR PLAN 5
-improve score 1, no need for chemical prophylaxis, encourage early ambulation. -holding home med nifedipine, monitor BP and restart medications when pt's condition improves.

## 2017-05-25 NOTE — PROGRESS NOTE ADULT - PROBLEM SELECTOR PLAN 2
-f/u hemoglobin A1c, concern for new onset diabetes.  -Monitor blood glucose.  -Consistent carbohydrate diet. -f/u hemoglobin A1c, suspected new onset diabetes.  -Monitor blood glucose. Consider endocrine consult.  -Consistent carbohydrate diet. -Right underarm, pus drains upon pressure.  -Continue with antibiotics.  -Dressing applied with foam guaze.

## 2017-05-26 ENCOUNTER — TRANSCRIPTION ENCOUNTER (OUTPATIENT)
Age: 37
End: 2017-05-26

## 2017-05-26 DIAGNOSIS — I10 ESSENTIAL (PRIMARY) HYPERTENSION: ICD-10-CM

## 2017-05-26 LAB
ANION GAP SERPL CALC-SCNC: 8 MMOL/L — SIGNIFICANT CHANGE UP (ref 5–17)
BASOPHILS # BLD AUTO: 0.1 K/UL — SIGNIFICANT CHANGE UP (ref 0–0.2)
BASOPHILS NFR BLD AUTO: 0.8 % — SIGNIFICANT CHANGE UP (ref 0–2)
BUN SERPL-MCNC: 11 MG/DL — SIGNIFICANT CHANGE UP (ref 7–18)
CALCIUM SERPL-MCNC: 8.5 MG/DL — SIGNIFICANT CHANGE UP (ref 8.4–10.5)
CHLORIDE SERPL-SCNC: 107 MMOL/L — SIGNIFICANT CHANGE UP (ref 96–108)
CO2 SERPL-SCNC: 23 MMOL/L — SIGNIFICANT CHANGE UP (ref 22–31)
CREAT SERPL-MCNC: 1.21 MG/DL — SIGNIFICANT CHANGE UP (ref 0.5–1.3)
CULTURE RESULTS: SIGNIFICANT CHANGE UP
EOSINOPHIL # BLD AUTO: 0.3 K/UL — SIGNIFICANT CHANGE UP (ref 0–0.5)
EOSINOPHIL NFR BLD AUTO: 3 % — SIGNIFICANT CHANGE UP (ref 0–6)
GLUCOSE SERPL-MCNC: 296 MG/DL — HIGH (ref 70–99)
HCT VFR BLD CALC: 38.9 % — SIGNIFICANT CHANGE UP (ref 34.5–45)
HGB BLD-MCNC: 12.3 G/DL — SIGNIFICANT CHANGE UP (ref 11.5–15.5)
HIV 1 & 2 AB SERPL IA.RAPID: SIGNIFICANT CHANGE UP
HIV 1+2 AB+HIV1 P24 AG SERPL QL IA: SIGNIFICANT CHANGE UP
LYMPHOCYTES # BLD AUTO: 3.4 K/UL — HIGH (ref 1–3.3)
LYMPHOCYTES # BLD AUTO: 34 % — SIGNIFICANT CHANGE UP (ref 13–44)
MAGNESIUM SERPL-MCNC: 1.8 MG/DL — SIGNIFICANT CHANGE UP (ref 1.6–2.6)
MCHC RBC-ENTMCNC: 27.7 PG — SIGNIFICANT CHANGE UP (ref 27–34)
MCHC RBC-ENTMCNC: 31.6 GM/DL — LOW (ref 32–36)
MCV RBC AUTO: 87.5 FL — SIGNIFICANT CHANGE UP (ref 80–100)
MONOCYTES # BLD AUTO: 0.7 K/UL — SIGNIFICANT CHANGE UP (ref 0–0.9)
MONOCYTES NFR BLD AUTO: 7.2 % — SIGNIFICANT CHANGE UP (ref 2–14)
NEUTROPHILS # BLD AUTO: 5.5 K/UL — SIGNIFICANT CHANGE UP (ref 1.8–7.4)
NEUTROPHILS NFR BLD AUTO: 55 % — SIGNIFICANT CHANGE UP (ref 43–77)
PHOSPHATE SERPL-MCNC: 2.9 MG/DL — SIGNIFICANT CHANGE UP (ref 2.5–4.5)
PLATELET # BLD AUTO: 270 K/UL — SIGNIFICANT CHANGE UP (ref 150–400)
POTASSIUM SERPL-MCNC: 3.4 MMOL/L — LOW (ref 3.5–5.3)
POTASSIUM SERPL-SCNC: 3.4 MMOL/L — LOW (ref 3.5–5.3)
RBC # BLD: 4.45 M/UL — SIGNIFICANT CHANGE UP (ref 3.8–5.2)
RBC # FLD: 12.2 % — SIGNIFICANT CHANGE UP (ref 10.3–14.5)
SODIUM SERPL-SCNC: 138 MMOL/L — SIGNIFICANT CHANGE UP (ref 135–145)
SPECIMEN SOURCE: SIGNIFICANT CHANGE UP
WBC # BLD: 9.9 K/UL — SIGNIFICANT CHANGE UP (ref 3.8–10.5)
WBC # FLD AUTO: 9.9 K/UL — SIGNIFICANT CHANGE UP (ref 3.8–10.5)

## 2017-05-26 PROCEDURE — 99233 SBSQ HOSP IP/OBS HIGH 50: CPT | Mod: GC

## 2017-05-26 RX ORDER — FLUCONAZOLE 150 MG/1
150 TABLET ORAL ONCE
Qty: 0 | Refills: 0 | Status: COMPLETED | OUTPATIENT
Start: 2017-05-26 | End: 2017-05-26

## 2017-05-26 RX ORDER — POTASSIUM CHLORIDE 20 MEQ
40 PACKET (EA) ORAL ONCE
Qty: 0 | Refills: 0 | Status: COMPLETED | OUTPATIENT
Start: 2017-05-26 | End: 2017-05-26

## 2017-05-26 RX ORDER — INSULIN GLARGINE 100 [IU]/ML
30 INJECTION, SOLUTION SUBCUTANEOUS AT BEDTIME
Qty: 0 | Refills: 0 | Status: DISCONTINUED | OUTPATIENT
Start: 2017-05-26 | End: 2017-05-26

## 2017-05-26 RX ORDER — INSULIN GLARGINE 100 [IU]/ML
32 INJECTION, SOLUTION SUBCUTANEOUS AT BEDTIME
Qty: 0 | Refills: 0 | Status: DISCONTINUED | OUTPATIENT
Start: 2017-05-26 | End: 2017-05-27

## 2017-05-26 RX ORDER — ERGOCALCIFEROL 1.25 MG/1
50000 CAPSULE ORAL
Qty: 0 | Refills: 0 | Status: DISCONTINUED | OUTPATIENT
Start: 2017-05-26 | End: 2017-05-27

## 2017-05-26 RX ORDER — INSULIN LISPRO 100/ML
9 VIAL (ML) SUBCUTANEOUS
Qty: 0 | Refills: 0 | Status: DISCONTINUED | OUTPATIENT
Start: 2017-05-26 | End: 2017-05-27

## 2017-05-26 RX ADMIN — Medication 9 UNIT(S): at 17:39

## 2017-05-26 RX ADMIN — Medication 5 MILLIGRAM(S): at 06:00

## 2017-05-26 RX ADMIN — Medication 2: at 17:40

## 2017-05-26 RX ADMIN — FLUCONAZOLE 150 MILLIGRAM(S): 150 TABLET ORAL at 17:39

## 2017-05-26 RX ADMIN — Medication 650 MILLIGRAM(S): at 04:30

## 2017-05-26 RX ADMIN — Medication: at 13:29

## 2017-05-26 RX ADMIN — Medication 7 UNIT(S): at 09:09

## 2017-05-26 RX ADMIN — Medication 650 MILLIGRAM(S): at 03:24

## 2017-05-26 RX ADMIN — Medication 100 MILLIGRAM(S): at 13:11

## 2017-05-26 RX ADMIN — TACROLIMUS 3 MILLIGRAM(S): 5 CAPSULE ORAL at 19:05

## 2017-05-26 RX ADMIN — CEFTRIAXONE 100 GRAM(S): 500 INJECTION, POWDER, FOR SOLUTION INTRAMUSCULAR; INTRAVENOUS at 21:13

## 2017-05-26 RX ADMIN — Medication 100 MILLIGRAM(S): at 21:14

## 2017-05-26 RX ADMIN — Medication 40 MILLIEQUIVALENT(S): at 13:30

## 2017-05-26 RX ADMIN — INSULIN GLARGINE 32 UNIT(S): 100 INJECTION, SOLUTION SUBCUTANEOUS at 21:16

## 2017-05-26 RX ADMIN — TACROLIMUS 3 MILLIGRAM(S): 5 CAPSULE ORAL at 06:00

## 2017-05-26 RX ADMIN — Medication 3: at 09:10

## 2017-05-26 RX ADMIN — ERGOCALCIFEROL 50000 UNIT(S): 1.25 CAPSULE ORAL at 13:11

## 2017-05-26 NOTE — CONSULT NOTE ADULT - ASSESSMENT
Suppurative Hidradenitis  UTI  Rocephin  Clindamycin Suppurative Hidradenitis Rt axilla  UTI  Rocephin 1 gram qd  Clindamycin 600 mg q8 iv Suppurative Hidradenitis Rt axilla  UTI/ vaginitis  plan -Rocephin 1 gram qd  Clindamycin 600 mg q8 iv  diflucan 150mgs po x 1 dose  possible dc home tomorrow on augmentin 875mgs po bid x 10 days

## 2017-05-26 NOTE — PROGRESS NOTE ADULT - PROBLEM SELECTOR PLAN 4
-Cr 1.2, on IVF NS at 75cc/hr.  -Continue with Tacrolimus + Prednisone.  -Monitor renal function  -Treat UTI with antibiotics.

## 2017-05-26 NOTE — DISCHARGE NOTE ADULT - HOSPITAL COURSE
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 patient states she was in her usual state of health 3 days ago when she noticed chills , she took her temperture and it was noted to be 103 , she took some tylenol , but the fever persisted for the nest 2 dyas ranging from 101-103 F . Patient also reports increased frequency of micturation and burning when she urinates for the past 3 days , denies any flank pain or foul smelling urine . Also reports intermittent frontal headaches for past 3 months , she states its " migraine" from the stress of changing jobs . Pt denies nausea, vomiting, diarrhea or sob. ROS is negative excpet above.    In the ER patient's VSS t 99.8 , hr 116 , bp 131/69 , rr 20 pulse ox of 98 , patient was found to have positive ua . wbc count of 14.5 , BUN/cr 9/1.55 , bsl of 455 ( no anion gap) got 2 liet bolus and 5 units insulin with repeat bsl of 165 , patient also got one dose of rocephin in the ER. 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 patient states she was in her usual state of health 3 days ago when she noticed chills , she took her temperture and it was noted to be 103 , she took some tylenol , but the fever persisted for the nest 2 dyas ranging from 101-103 F . Patient also reports increased frequency of micturation and burning when she urinates for the past 3 days , denies any flank pain or foul smelling urine . Also reports intermittent frontal headaches for past 3 months , she states its " migraine" from the stress of changing jobs . Pt denies nausea, vomiting, diarrhea or sob. ROS is negative excpet above.    In the ER patient's VSS t 99.8 , hr 116 , bp 131/69 , rr 20 pulse ox of 98 , patient was found to have positive ua . wbc count of 14.5 , BUN/cr 9/1.55 , bsl of 455 ( no anion gap) got 2 liter bolus and 5 units insulin with repeat glucose of 165, patient also got one dose of rocephin in the ER. Patient was admitted to the medical floor for concern for sepsis from UTI. 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 patient states she was in her usual state of health 3 days ago when she noticed chills , she took her temperature and it was noted to be 103 , she took some tylenol , but the fever persisted for the nest 2 dyas ranging from 101-103 F . Patient also reports increased frequency of urination and burning when she urinates for the past 3 days , denies any flank pain or foul smelling urine . Also reports intermittent frontal headaches for past 3 months , she states its " migraine" from the stress of changing jobs . Pt denies nausea, vomiting, diarrhea or sob. ROS is negative except as above.    In the ER patient's VS t 99.8 , hr 116 , bp 131/69 , rr 20 pulse ox of 98, patient was found to have positive UA. wbc count of 14.5 , BUN/cr 9/1.55 , blood glucose of 455 ( no anion gap) got 2 liter bolus and 5 units insulin with repeat glucose of 165, patient also got one dose of rocephin in the ER. Patient was admitted to the medical floor for concern for sepsis from UTI.     Urine and blood culture were collected after starting antibiotics were started, and both were negative for pathogens. Patient was found to have Hba1c of 13.2 on the lab. Was diagnosed with new onset DM, and Dr. Duarte was consulted. Patient was started on lantus and premeal humalog based on the weight and doses were adjusted based on the blood glucose level. Upon discharge, lantus **** unit and humalog *** unit will be prescribed, Patient needs to follow up with PMD in 1 week after discharge.    Patient was also found to have pus draining lesion on the right axilla(Suppurative hidradenitis). She was seen by Casey Brady and surgical staff on 5/26/17. Outpatient excision of the lesion was recommended by surgery, but no acute surgical intervention was performed. Dr. Peña recommended Augmentin 875mg q 12hrs PO to be given for 10 more days upon discharge. Patient received IV clindamycin 600mg q 8hrs while in the hospital.    Patient is medically stable to be discharged, f/u with PMD in 1 week for new onset DM is required. Diabetic education was given at the bedside, including regular exercise, weight reduction, modification of diet, checking fingerstick blood glucose, and administration of insulin. Patient understood plans as above. Case was discussed with attending Dr. Rothman upon discharge. 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 patient states she was in her usual state of health 3 days ago when she noticed chills , she took her temperature and it was noted to be 103 , she took some tylenol , but the fever persisted for the nest 2 dyas ranging from 101-103 F . Patient also reports increased frequency of urination and burning when she urinates for the past 3 days , denies any flank pain or foul smelling urine . Also reports intermittent frontal headaches for past 3 months , she states its " migraine" from the stress of changing jobs . Pt denies nausea, vomiting, diarrhea or sob. ROS is negative except as above.    In the ER patient's VS t 99.8 , hr 116 , bp 131/69 , rr 20 pulse ox of 98, patient was found to have positive UA. wbc count of 14.5 , BUN/cr 9/1.55 , blood glucose of 455 ( no anion gap) got 2 liter bolus and 5 units insulin with repeat glucose of 165, patient also got one dose of rocephin in the ER. Patient was admitted to the medical floor for concern for sepsis from UTI.     Urine and blood culture were collected after starting antibiotics were started, and both were negative for pathogens. Patient was found to have Hba1c of 13.2 on the lab. Was diagnosed with new onset DM, and Dr. Duarte was consulted. Patient was started on lantus and premeal humalog based on the weight and doses were adjusted based on the blood glucose level. Upon discharge, lantus 38 unit and humalog 12 unit will be prescribed, Patient needs to follow up with PMD in 1 week after discharge.    Patient was also found to have pus draining lesion on the right axilla(Suppurative hidradenitis). She was seen by Casey Brady and surgical staff on 5/26/17. Outpatient excision of the lesion was recommended by surgery, but no acute surgical intervention was performed. Dr. Peña recommended Augmentin 875mg q 12hrs PO to be given for 10 more days upon discharge. Patient received IV clindamycin 600mg q 8hrs while in the hospital.    Patient is medically stable to be discharged, f/u with PMD in 1 week for new onset DM is required. Diabetic education was given at the bedside, including regular exercise, weight reduction, modification of diet, checking fingerstick blood glucose, and administration of insulin. Patient understood plans as above. Case was discussed with attending Dr. Rothman upon discharge. 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 patient states she was in her usual state of health 3 days ago when she noticed chills , she took her temperature and it was noted to be 103 , she took some tylenol , but the fever persisted for the nest 2 dyas ranging from 101-103 F . Patient also reports increased frequency of urination and burning when she urinates for the past 3 days , denies any flank pain or foul smelling urine . Also reports intermittent frontal headaches for past 3 months , she states its " migraine" from the stress of changing jobs . Pt denies nausea, vomiting, diarrhea or sob. ROS is negative except as above.    In the ER patient's VS t 99.8 , hr 116 , bp 131/69 , rr 20 pulse ox of 98, patient was found to have positive UA. wbc count of 14.5 , BUN/cr 9/1.55 , blood glucose of 455 ( no anion gap) got 2 liter bolus and 5 units insulin with repeat glucose of 165, patient also got one dose of rocephin in the ER. Patient was admitted to the medical floor for concern for sepsis from UTI.     Urine and blood culture were collected after starting antibiotics were started, and both were negative for pathogens. Patient was found to have Hba1c of 13.2 on the lab. Was diagnosed with new onset DM, and Dr. Duarte was consulted. Patient was started on lantus and premeal humalog based on the weight and doses were adjusted based on the blood glucose level. Upon discharge, lantus 38 unit and humalog 12 unit will be prescribed, Patient needs to follow up with PMD in 1 week after discharge.    Patient was also found to have pus draining lesion on the right axilla(Suppurative hidradenitis). She was seen by Casey Brady and surgical staff on 5/26/17. Outpatient excision of the lesion was recommended by surgery, but no acute surgical intervention was performed. Dr. Peña recommended Augmentin 875mg q 12hrs PO to be given for 8 more days upon discharge. Patient received IV clindamycin 600mg q 8hrs while in the hospital.    Patient is medically stable to be discharged, f/u with PMD in 1 week for new onset DM is required. Diabetic education was given at the bedside, including regular exercise, weight reduction, modification of diet, checking fingerstick blood glucose, and administration of insulin. Patient understood plans as above. Case was discussed with attending Dr. Rothman upon discharge. 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 patient states she was in her usual state of health 3 days ago when she noticed chills , she took her temperature and it was noted to be 103 , she took some tylenol , but the fever persisted for the nest 2 dyas ranging from 101-103 F . Patient also reports increased frequency of urination and burning when she urinates for the past 3 days , denies any flank pain or foul smelling urine . Also reports intermittent frontal headaches for past 3 months , she states its " migraine" from the stress of changing jobs . Pt denies nausea, vomiting, diarrhea or sob. ROS is negative except as above.    In the ER patient's VS t 99.8 , hr 116 , bp 131/69 , rr 20 pulse ox of 98, patient was found to have positive UA. wbc count of 14.5 , BUN/cr 9/1.55 , blood glucose of 455 ( no anion gap) got 2 liter bolus and 5 units insulin with repeat glucose of 165, patient also got one dose of rocephin in the ER. Patient was admitted to the medical floor for concern for sepsis from UTI.     Urine and blood culture were collected after starting antibiotics were started, and both were negative for pathogens. Patient was found to have Hba1c of 13.2 on the lab. Was diagnosed with new onset DM, and Dr. Duarte was consulted. Patient was started on lantus and premeal humalog based on the weight and doses were adjusted based on the blood glucose level. Upon discharge, lantus 38 unit and humalog 12 unit will be prescribed, Patient needs to follow up with PMD in 1 week after discharge.    Patient was also found to have pus draining lesion on the right axilla(Suppurative hidradenitis). She was seen by Casey Brady and surgical staff on 5/26/17. Outpatient excision of the lesion was recommended by surgery, but no acute surgical intervention was performed. Dr. Peña recommended Augmentin 875mg q 12hrs PO to be given for 8 more days upon discharge. Patient received IV clindamycin 600mg q 8hrs while in the hospital.    Patient is medically stable to be discharged, f/u with PMD in 1 week for new onset DM is required. Diabetic education was given at the bedside, including regular exercise, weight reduction, modification of diet, checking fingerstick blood glucose, and administration of insulin. Patient understood plans as above. Case was discussed with attending Dr. Rothman upon discharge.    Med Attending Addendum: patient was seen and evaluated on the day of discharge 5/27/3017. Patient had no complaints. Draining from suppurativa hidrandenitis was resolved with significant improvement in cellulitis at the right axilla. medications were well tolerated by the patient. She remained afebrile. Patient advised to assess and record blood sugar values in a notebook to present to endocrinology at her follow-up appointment. Patient has a new job and reports that she has new insurance with [employer]. Patient discharged in stable condition. Smoking cessation counseling provided.  40 minutes spent on discharge and planning

## 2017-05-26 NOTE — PROGRESS NOTE ADULT - PROBLEM SELECTOR PLAN 6
-Low IMPROVE score, no need for DVT prophylaxis.
-improve score 1, no need for chemical prophylaxis, encourage early ambulation.

## 2017-05-26 NOTE — PROGRESS NOTE ADULT - PROBLEM SELECTOR PLAN 2
-Surgery saw the lesion - no surgical intervention now, outpatient elective wide excision. Dr. Ochoa.  - ID Dr. Peña was consulted for antibiotic choice.  - F/U with ID.

## 2017-05-26 NOTE — CONSULT NOTE ADULT - ASSESSMENT
36 year old morbidly obese female from home with PMHx Right Renal Transplant in 1999 2/2 Unspecified Congential Progressive Kidney Disease, on Tacrolimus and Prednisone, HTN, and Gestational Pre-Diabetes, who p/w c/o several weeks objective fevers 101 - 103 F, diaphoresis, fatigue, headaches, malaise, polydipsia, and polyuria, and ~5 days right axilla skin break a/w drainage and erythema.  Upon arrival to the ED, pt was tachycardic, initial labs significant for blood glucose > 400 and HbA1C 13.2.  Pt admitted for Purulent Hydraadenitis Suppurativa and New Onset Diabetes.  Pt started on Lantus 25 U sc qhs, Humalog 7 U tid before meals, and IHSS.

## 2017-05-26 NOTE — PROGRESS NOTE ADULT - SUBJECTIVE AND OBJECTIVE BOX
Patient is a 36y old  Female who presents with a chief complaint of fever, chills, and urinary frequency    INTERVAL HPI/OVERNIGHT EVENTS: feels better    T(C): 36.6, Max: 37.2 ( @ 23:21)  HR: 78 (78 - 94)  BP: 112/82 (112/82 - 116/77)  RR: 18 (16 - 18)  SpO2: 99% (99% - 100%)      LABS:                        12.3   9.9   )-----------( 270      ( 26 May 2017 06:29 )             38.9     -    138  |  107  |  11  ----------------------------<  296<H>  3.4<L>   |  23  |  1.21    Ca    8.5      26 May 2017 06:29  Phos  2.9       Mg     1.8         TPro  6.8  /  Alb  2.3<L>  /  TBili  0.4  /  DBili  x   /  AST  10  /  ALT  17  /  AlkPhos  96  25      Urinalysis Basic - ( 24 May 2017 20:06 )    Color: Yellow / Appearance: Clear / S.010 / pH: x  Gluc: x / Ketone: Negative  / Bili: Negative / Urobili: Negative   Blood: x / Protein: 30 mg/dL / Nitrite: Negative   Leuk Esterase: Moderate / RBC: 25-50 /HPF / WBC 11-25 /HPF   Sq Epi: x / Non Sq Epi: Few / Bacteria: Moderate /HPF      CAPILLARY BLOOD GLUCOSE  Morning blood sugar pending.  273 (25 May 2017 21:06)  265 (25 May 2017 16:00)  355 (25 May 2017 12:15)    LIVER FUNCTIONS - ( 25 May 2017 05:46 )  Alb: 2.3 g/dL / Pro: 6.8 g/dL / ALK PHOS: 96 U/L / ALT: 17 U/L DA / AST: 10 U/L / GGT: x                   MEDICATIONS  (STANDING):  insulin lispro (HumaLOG) corrective regimen sliding scale  SubCutaneous three times a day before meals  sodium chloride 0.9%. 1000milliLiter(s) IV Continuous <Continuous>  cefTRIAXone   IVPB 1Gram(s) IV Intermittent every 24 hours  tacrolimus 3milliGRAM(s) Oral every 12 hours  predniSONE   Tablet 5milliGRAM(s) Oral daily  insulin glargine Injectable (LANTUS) 25Unit(s) SubCutaneous at bedtime  insulin lispro Injectable (HumaLOG) 7Unit(s) SubCutaneous three times a day before meals    MEDICATIONS  (PRN):  dextrose Gel 1Dose(s) Oral once PRN Blood Glucose LESS THAN 70 milliGRAM(s)/deciLiter  glucagon  Injectable 1milliGRAM(s) IntraMuscular once PRN Glucose <70 milliGRAM(s)/deciLiter  acetaminophen   Tablet. 650milliGRAM(s) Oral every 6 hours PRN Moderate Pain (4 - 6)      RADIOLOGY & ADDITIONAL TESTS:    Imaging Personally Reviewed:  [x ] YES  [ ] NO    Consultant(s) Notes Reviewed:  [x ] YES  [ ] NO    REVIEW OF SYSTEMS:  CONSTITUTIONAL: No fever, weight loss, or fatigue  EYES: No eye pain, visual disturbances, or discharge  ENMT:  No difficulty hearing, tinnitus, vertigo; No sinus or throat pain  NECK: No pain or stiffness  BREASTS: No pain, masses, or nipple discharge  RESPIRATORY: No cough, wheezing, chills or hemoptysis; No shortness of breath  CARDIOVASCULAR: No chest pain, palpitations, dizziness, or leg swelling  GASTROINTESTINAL: No abdominal or epigastric pain. No nausea, vomiting, or hematemesis; No diarrhea or constipation.   GENITOURINARY: No dysuria, frequency, hematuria, or incontinence  NEUROLOGICAL: intermittent headaches, memory loss, loss of strength, numbness, or tremors  SKIN: No itching, burning, rashes, or lesions   LYMPH NODES: No enlarged glands  ENDOCRINE: No heat or cold intolerance; No hair loss  MUSCULOSKELETAL: No joint pain or swelling; No muscle, back, or extremity pain  PSYCHIATRIC: No depression, anxiety, mood swings, or difficulty sleeping      PHYSICAL EXAM:  GENERAL: NAD, well-groomed, well-developed  HEAD:  Atraumatic, Normocephalic  EYES: EOMI, PERRLA, conjunctiva and sclera clear  ENMT: No tonsillar erythema, exudates, or enlargement; Moist mucous membranes, Good dentition, No lesions  NECK: Supple, No JVD, Normal thyroid  NERVOUS SYSTEM:  Alert & Oriented X3, Good concentration; Motor Strength 5/5 B/L upper and lower extremities.  CHEST/LUNG: Clear to percussion bilaterally; No rales, rhonchi, wheezing, or rubs  HEART: Regular rate and rhythm; No murmurs, rubs, or gallops  ABDOMEN: Soft, Nontender, Nondistended; Bowel sounds present  EXTREMITIES:  2+ Peripheral Pulses bilaterally, No clubbing, cyanosis, or edema  LYMPH: No lymphadenopathy noted  SKIN: pus decreased on the right axilla upon pressure, no tenderness.  Groin area clean, no pustular lesions found.    Care Discussed with Consultants/Other Providers [x ] YES  [ ] NO Patient is a 36y old  Female who presents with a chief complaint of fever, chills, and urinary frequency    INTERVAL HPI/OVERNIGHT EVENTS: feels better    T(C): 36.6, Max: 37.2 ( @ 23:21)  HR: 78 (78 - 94)  BP: 112/82 (112/82 - 116/77)  RR: 18 (16 - 18)  SpO2: 99% (99% - 100%)      LABS:                        12.3   9.9   )-----------( 270      ( 26 May 2017 06:29 )             38.9     -    138  |  107  |  11  ----------------------------<  296<H>  3.4<L>   |  23  |  1.21    Ca    8.5      26 May 2017 06:29  Phos  2.9       Mg     1.8         TPro  6.8  /  Alb  2.3<L>  /  TBili  0.4  /  DBili  x   /  AST  10  /  ALT  17  /  AlkPhos  96  25      Urinalysis Basic - ( 24 May 2017 20:06 )    Color: Yellow / Appearance: Clear / S.010 / pH: x  Gluc: x / Ketone: Negative  / Bili: Negative / Urobili: Negative   Blood: x / Protein: 30 mg/dL / Nitrite: Negative   Leuk Esterase: Moderate / RBC: 25-50 /HPF / WBC 11-25 /HPF   Sq Epi: x / Non Sq Epi: Few / Bacteria: Moderate /HPF      CAPILLARY BLOOD GLUCOSE  Morning fingerstick blood sugar pending. Lab glucose 296  273 (25 May 2017 21:06)  265 (25 May 2017 16:00)  355 (25 May 2017 12:15)    LIVER FUNCTIONS - ( 25 May 2017 05:46 )  Alb: 2.3 g/dL / Pro: 6.8 g/dL / ALK PHOS: 96 U/L / ALT: 17 U/L DA / AST: 10 U/L / GGT: x                   MEDICATIONS  (STANDING):  insulin lispro (HumaLOG) corrective regimen sliding scale  SubCutaneous three times a day before meals  sodium chloride 0.9%. 1000milliLiter(s) IV Continuous <Continuous>  cefTRIAXone   IVPB 1Gram(s) IV Intermittent every 24 hours  tacrolimus 3milliGRAM(s) Oral every 12 hours  predniSONE   Tablet 5milliGRAM(s) Oral daily  insulin glargine Injectable (LANTUS) 25Unit(s) SubCutaneous at bedtime  insulin lispro Injectable (HumaLOG) 7Unit(s) SubCutaneous three times a day before meals    MEDICATIONS  (PRN):  dextrose Gel 1Dose(s) Oral once PRN Blood Glucose LESS THAN 70 milliGRAM(s)/deciLiter  glucagon  Injectable 1milliGRAM(s) IntraMuscular once PRN Glucose <70 milliGRAM(s)/deciLiter  acetaminophen   Tablet. 650milliGRAM(s) Oral every 6 hours PRN Moderate Pain (4 - 6)      RADIOLOGY & ADDITIONAL TESTS:    Imaging Personally Reviewed:  [x ] YES  [ ] NO    Consultant(s) Notes Reviewed:  [x ] YES  [ ] NO    REVIEW OF SYSTEMS:  CONSTITUTIONAL: No fever, weight loss, or fatigue  EYES: No eye pain, visual disturbances, or discharge  ENMT:  No difficulty hearing, tinnitus, vertigo; No sinus or throat pain  NECK: No pain or stiffness  BREASTS: No pain, masses, or nipple discharge  RESPIRATORY: No cough, wheezing, chills or hemoptysis; No shortness of breath  CARDIOVASCULAR: No chest pain, palpitations, dizziness, or leg swelling  GASTROINTESTINAL: No abdominal or epigastric pain. No nausea, vomiting, or hematemesis; No diarrhea or constipation.   GENITOURINARY: No dysuria, frequency, hematuria, or incontinence  NEUROLOGICAL: intermittent headaches, memory loss, loss of strength, numbness, or tremors  SKIN: No itching, burning, rashes, or lesions   LYMPH NODES: No enlarged glands  ENDOCRINE: No heat or cold intolerance; No hair loss  MUSCULOSKELETAL: No joint pain or swelling; No muscle, back, or extremity pain  PSYCHIATRIC: No depression, anxiety, mood swings, or difficulty sleeping      PHYSICAL EXAM:  GENERAL: NAD, well-groomed, well-developed  HEAD:  Atraumatic, Normocephalic  EYES: EOMI, PERRLA, conjunctiva and sclera clear  ENMT: No tonsillar erythema, exudates, or enlargement; Moist mucous membranes, Good dentition, No lesions  NECK: Supple, No JVD, Normal thyroid  NERVOUS SYSTEM:  Alert & Oriented X3, Good concentration; Motor Strength 5/5 B/L upper and lower extremities.  CHEST/LUNG: Clear to percussion bilaterally; No rales, rhonchi, wheezing, or rubs  HEART: Regular rate and rhythm; No murmurs, rubs, or gallops  ABDOMEN: Soft, Nontender, Nondistended; Bowel sounds present  EXTREMITIES:  2+ Peripheral Pulses bilaterally, No clubbing, cyanosis, or edema  LYMPH: No lymphadenopathy noted  SKIN: pus decreased on the right axilla upon pressure, no tenderness.  Groin area clean, no pustular lesions found.    Care Discussed with Consultants/Other Providers [x ] YES  [ ] NO

## 2017-05-26 NOTE — DISCHARGE NOTE ADULT - PATIENT PORTAL LINK FT
“You can access the [employer] Patient Portal, offered by [facility], by registering with the following website: [email or web address]”

## 2017-05-26 NOTE — CONSULT NOTE ADULT - SUBJECTIVE AND OBJECTIVE BOX
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 patient states she was in her usual state of health 3 days ago when she noticed chills , she took her temperture and it was noted to be 103 , she took some tylenol , but the fever persisted for the nest 2 dyas ranging from 101-103 F . Patient also reports increased frequency of micturation and burning when she urinates for the past 3 days , denies any flank pain or foul smelling urine . Also reports intermittent frontal headaches for past 3 months , she states its " migraine" from the stress of changing jobs . Pt denies nausea, vomiting, diarrhea or sob. ROS is negative excpet above.    In the ER patient's VSS t 99.8 , hr 116 , bp 131/69 , rr 20 pulse ox of 98 , patient was found to have positive ua . wbc count of 14.5 , BUN/cr 9/1.55 , bsl of 455 ( no anion gap) got 2 liet bolus and 5 units insulin with repeat bsl of 165 , patient also got one dose of rocephin in the ER. (24 May 2017 23:56)      PAST MEDICAL & SURGICAL HISTORY:  No pertinent past medical history  No significant past surgical history      No Known Allergies      Meds:  insulin lispro (HumaLOG) corrective regimen sliding scale  SubCutaneous three times a day before meals  dextrose 5%. 1000milliLiter(s) IV Continuous <Continuous>  dextrose Gel 1Dose(s) Oral once PRN  dextrose 50% Injectable 12.5Gram(s) IV Push once  dextrose 50% Injectable 25Gram(s) IV Push once  dextrose 50% Injectable 25Gram(s) IV Push once  glucagon  Injectable 1milliGRAM(s) IntraMuscular once PRN  sodium chloride 0.9%. 1000milliLiter(s) IV Continuous <Continuous>  cefTRIAXone   IVPB 1Gram(s) IV Intermittent every 24 hours  tacrolimus 3milliGRAM(s) Oral every 12 hours  predniSONE   Tablet 5milliGRAM(s) Oral daily  acetaminophen   Tablet. 650milliGRAM(s) Oral every 6 hours PRN  ergocalciferol [id number](s) Oral <User Schedule>  clindamycin IVPB 600milliGRAM(s) IV Intermittent every 8 hours  clindamycin IVPB  IV Intermittent   insulin glargine Injectable (LANTUS) 30Unit(s) SubCutaneous at bedtime  insulin lispro Injectable (HumaLOG) 9Unit(s) SubCutaneous three times a day before meals      SOCIAL HISTORY:  Smoker:  YES / NO        PACK YEARS:                         WHEN QUIT?  ETOH use:  YES / NO               FREQUENCY / QUANTITY:  Ilicit Drug use:  YES / NO  Occupation:  Assisted device use (Cane / Walker):  Live with:    FAMILY HISTORY:      VITALS:  Vital Signs Last 24 Hrs  T(C): 36.6, Max: 37.2 ( @ 23:21)  T(F): 97.8, Max: 99 ( @ 23:21)  HR: 78 (78 - 94)  BP: 112/82 (112/82 - 116/77)  BP(mean): --  RR: 18 (16 - 18)  SpO2: 99% (99% - 100%)    LABS/DIAGNOSTIC TESTS:                          12.3   9.9   )-----------( 270      ( 26 May 2017 06:29 )             38.9     WBC Count: 9.9 K/uL ( @ 06:29)  WBC Count: 12.2 K/uL ( @ 05:46)  WBC Count: 14.5 K/uL ( @ 20:06)          138  |  107  |  11  ----------------------------<  296<H>  3.4<L>   |  23  |  1.21    Ca    8.5      26 May 2017 06:29  Phos  2.9       Mg     1.8         TPro  6.8  /  Alb  2.3<L>  /  TBili  0.4  /  DBili  x   /  AST  10  /  ALT  17  /  AlkPhos  96        Urinalysis Basic - ( 24 May 2017 20:06 )    Color: Yellow / Appearance: Clear / S.010 / pH: x  Gluc: x / Ketone: Negative  / Bili: Negative / Urobili: Negative   Blood: x / Protein: 30 mg/dL / Nitrite: Negative   Leuk Esterase: Moderate / RBC: 25-50 /HPF / WBC 11-25 /HPF   Sq Epi: x / Non Sq Epi: Few / Bacteria: Moderate /HPF        LIVER FUNCTIONS - ( 25 May 2017 05:46 )  Alb: 2.3 g/dL / Pro: 6.8 g/dL / ALK PHOS: 96 U/L / ALT: 17 U/L DA / AST: 10 U/L / GGT: x                 LACTATE:    ABG -     CULTURES:   .Urine Clean Catch (Midstream)  05-25 @ 13:10   <10,000 CFU/ml Normal Urogenital eva present  --  --          RADIOLOGY:        ROS  [  ] UNABLE TO ELICIT HPI:  36 year old female from home past medical history of kidney transplant in  at 13 years of age for 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 Patient also reports increased frequency of micturition and burning when she urinates for the past 3 days , denies any flank pain or foul smelling urine . She also has swelling and abscess on Rt arm pit which started after shaving 1 week back. She had similar episode twice lat was 4 yrs back. She is newly diagnosed with DM in patient.      PAST MEDICAL & SURGICAL HISTORY:  No pertinent past medical history  No significant past surgical history      No Known Allergies      Meds:  insulin lispro (HumaLOG) corrective regimen sliding scale  SubCutaneous three times a day before meals  dextrose 5%. 1000milliLiter(s) IV Continuous <Continuous>  dextrose Gel 1Dose(s) Oral once PRN  dextrose 50% Injectable 12.5Gram(s) IV Push once  dextrose 50% Injectable 25Gram(s) IV Push once  dextrose 50% Injectable 25Gram(s) IV Push once  glucagon  Injectable 1milliGRAM(s) IntraMuscular once PRN  sodium chloride 0.9%. 1000milliLiter(s) IV Continuous <Continuous>  cefTRIAXone   IVPB 1Gram(s) IV Intermittent every 24 hours  tacrolimus 3milliGRAM(s) Oral every 12 hours  predniSONE   Tablet 5milliGRAM(s) Oral daily  acetaminophen   Tablet. 650milliGRAM(s) Oral every 6 hours PRN  ergocalciferol [id number](s) Oral <User Schedule>  clindamycin IVPB 600milliGRAM(s) IV Intermittent every 8 hours  clindamycin IVPB  IV Intermittent   insulin glargine Injectable (LANTUS) 30Unit(s) SubCutaneous at bedtime  insulin lispro Injectable (HumaLOG) 9Unit(s) SubCutaneous three times a day before meals      SOCIAL HISTORY:  Smoker: NO     ETOH use: NO             Ilicit Drug use:  YES / NO  Occupation: Medical assistant and   Live with: Son    FAMILY HISTORY: non significant      VITALS:  Vital Signs Last 24 Hrs  T(C): 36.6, Max: 37.2 (05-25 @ 23:21)  T(F): 97.8, Max: 99 ( @ 23:21)  HR: 78 (78 - 94)  BP: 112/82 (112/82 - 116/77)  BP(mean): --  RR: 18 (16 - 18)  SpO2: 99% (99% - 100%)    LABS/DIAGNOSTIC TESTS:                          12.3   9.9   )-----------( 270      ( 26 May 2017 06:29 )             38.9     WBC Count: 9.9 K/uL ( @ 06:29)  WBC Count: 12.2 K/uL ( @ 05:46)  WBC Count: 14.5 K/uL ( @ 20:06)          138  |  107  |  11  ----------------------------<  296<H>  3.4<L>   |  23  |  1.21    Ca    8.5      26 May 2017 06:29  Phos  2.9       Mg     1.8         TPro  6.8  /  Alb  2.3<L>  /  TBili  0.4  /  DBili  x   /  AST  10  /  ALT  17  /  AlkPhos  96        Urinalysis Basic - ( 24 May 2017 20:06 )    Color: Yellow / Appearance: Clear / S.010 / pH: x  Gluc: x / Ketone: Negative  / Bili: Negative / Urobili: Negative   Blood: x / Protein: 30 mg/dL / Nitrite: Negative   Leuk Esterase: Moderate / RBC: 25-50 /HPF / WBC 11-25 /HPF   Sq Epi: x / Non Sq Epi: Few / Bacteria: Moderate /HPF        LIVER FUNCTIONS - ( 25 May 2017 05:46 )  Alb: 2.3 g/dL / Pro: 6.8 g/dL / ALK PHOS: 96 U/L / ALT: 17 U/L DA / AST: 10 U/L / GGT: x           LACTATE:    ABG -     CULTURES:   .Urine Clean Catch (Midstream)   @ 13:10   <10,000 CFU/ml Normal Urogenital eva present  --  --          RADIOLOGY:  Clear lungs.No acute airspace disease suggested.          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 Patient also reports increased frequency of micturation and burning when she urinates for the past 3 days , denies any flank pain or foul smelling urine . She also has swelling and abscess on Rt arm pit which started after shaving 1 week back. She had similar episode twice lat was 4 yrs back. She is newly diagnosed with DM in patient.      PAST MEDICAL & SURGICAL HISTORY:  No pertinent past medical history  No significant past surgical history      No Known Allergies      Meds:  insulin lispro (HumaLOG) corrective regimen sliding scale  SubCutaneous three times a day before meals  dextrose 5%. 1000milliLiter(s) IV Continuous <Continuous>  dextrose Gel 1Dose(s) Oral once PRN  dextrose 50% Injectable 12.5Gram(s) IV Push once  dextrose 50% Injectable 25Gram(s) IV Push once  dextrose 50% Injectable 25Gram(s) IV Push once  glucagon  Injectable 1milliGRAM(s) IntraMuscular once PRN  sodium chloride 0.9%. 1000milliLiter(s) IV Continuous <Continuous>  cefTRIAXone   IVPB 1Gram(s) IV Intermittent every 24 hours  tacrolimus 3milliGRAM(s) Oral every 12 hours  predniSONE   Tablet 5milliGRAM(s) Oral daily  acetaminophen   Tablet. 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    FAMILY HISTORY: non significant      VITALS:  Vital Signs Last 24 Hrs  T(C): 36.6, Max: 37.2 (05-25 @ 23:21)  T(F): 97.8, Max: 99 ( @ 23:21)  HR: 78 (78 - 94)  BP: 112/82 (112/82 - 116/77)  BP(mean): --  RR: 18 (16 - 18)  SpO2: 99% (99% - 100%)    LABS/DIAGNOSTIC TESTS:                          12.3   9.9   )-----------( 270      ( 26 May 2017 06:29 )             38.9     WBC Count: 9.9 K/uL ( @ 06:29)  WBC Count: 12.2 K/uL ( @ 05:46)  WBC Count: 14.5 K/uL ( @ 20:06)          138  |  107  |  11  ----------------------------<  296<H>  3.4<L>   |  23  |  1.21    Ca    8.5      26 May 2017 06:29  Phos  2.9       Mg     1.8         TPro  6.8  /  Alb  2.3<L>  /  TBili  0.4  /  DBili  x   /  AST  10  /  ALT  17  /  AlkPhos  96        Urinalysis Basic - ( 24 May 2017 20:06 )    Color: Yellow / Appearance: Clear / S.010 / pH: x  Gluc: x / Ketone: Negative  / Bili: Negative / Urobili: Negative   Blood: x / Protein: 30 mg/dL / Nitrite: Negative   Leuk Esterase: Moderate / RBC: 25-50 /HPF / WBC 11-25 /HPF   Sq Epi: x / Non Sq Epi: Few / Bacteria: Moderate /HPF        LIVER FUNCTIONS - ( 25 May 2017 05:46 )  Alb: 2.3 g/dL / Pro: 6.8 g/dL / ALK PHOS: 96 U/L / ALT: 17 U/L DA / AST: 10 U/L / GGT: x           LACTATE:    ABG -     CULTURES:   .Urine Clean Catch (Midstream)   @ 13:10   <10,000 CFU/ml Normal Urogenital eva present  --  --          RADIOLOGY:  Clear lungs.No acute airspace disease suggested.          ROS  [  ] UNABLE TO ELICIT

## 2017-05-26 NOTE — DISCHARGE NOTE ADULT - CARE PLAN
Principal Discharge DX:	UTI (urinary tract infection)  Secondary Diagnosis:	Diabetes mellitus  Secondary Diagnosis:	Suppurative hidradenitis  Secondary Diagnosis:	Renal transplant recipient  Secondary Diagnosis:	Essential hypertension Principal Discharge DX:	UTI (urinary tract infection)  Goal:	Treat infection  Instructions for follow-up, activity and diet:	Please take oral antibiotics for *** more days as prescribed.  Secondary Diagnosis:	Diabetes mellitus  Goal:	Control blood glucose, new onset diabetes with Hba1c of 13.2  Instructions for follow-up, activity and diet:	Please get *** units of Lantus every night and *** units of humalog before meals at home. Your fasting blood glucose in the morning needs to be in . Check your blood glucose in a regular basis. Record your blood glucose at home and bring it to your primary doctor. Check your eye and foot with specialists at least once in a year to monitor complications from diabetes. Regular exercise with weight reduction is also very important to manage your blood glucose. If you have symptoms like excessive sweating, tremors, palpitations, or dizziness at anytime, it can be a sign of low blood glucose, which you should react immediately. Drink juice or take candy if you have those symptoms, and stop insulin and seek medical help. Monitor your Hba1c every 3 months with your primary doctor.  Secondary Diagnosis:	Suppurative hidradenitis  Goal:	Treat infection  Instructions for follow-up, activity and diet:	Continue augmentin 875mg q 12hrs for 10 more days.  Secondary Diagnosis:	Renal transplant recipient  Goal:	Prevent renal dysfunction  Instructions for follow-up, activity and diet:	Take Tacrolimus and prednisone at home dose.  Secondary Diagnosis:	Essential hypertension  Goal:	Monitor blood pressure. Goal <130/90  Instructions for follow-up, activity and diet:	Take nifedipine at home dose*** and monitor your blood pressure in a regular basis. Principal Discharge DX:	UTI (urinary tract infection)  Goal:	Treat infection  Instructions for follow-up, activity and diet:	Please take oral antibiotics ceftin for 4 days and clindamycin for 5 more days  Secondary Diagnosis:	Diabetes mellitus  Goal:	Control blood glucose, new onset diabetes with Hba1c of 13.2  Instructions for follow-up, activity and diet:	Please get 38 units of Lantus every night and 12 units of humalog before meals at home. Your fasting blood glucose in the morning needs to be in . Check your blood glucose in a regular basis. Record your blood glucose at home and bring it to your primary doctor. Check your eye and foot with specialists at least once in a year to monitor complications from diabetes. Regular exercise with weight reduction is also very important to manage your blood glucose. If you have symptoms like excessive sweating, tremors, palpitations, or dizziness at anytime, it can be a sign of low blood glucose, which you should react immediately. Drink juice or take candy if you have those symptoms, and stop insulin and seek medical help. Monitor your Hba1c every 3 months with your primary doctor.  Secondary Diagnosis:	Suppurative hidradenitis  Goal:	Treat infection  Instructions for follow-up, activity and diet:	Continue Clindamycin and Ceftin as prescribed  Secondary Diagnosis:	Renal transplant recipient  Goal:	Prevent renal dysfunction  Instructions for follow-up, activity and diet:	Take Tacrolimus and prednisone at home dose.  Secondary Diagnosis:	Essential hypertension  Goal:	Monitor blood pressure. Goal <130/90  Instructions for follow-up, activity and diet:	Take nifedipine at home dose*** and monitor your blood pressure in a regular basis. Principal Discharge DX:	UTI (urinary tract infection)  Goal:	Treat infection  Instructions for follow-up, activity and diet:	Please take oral antibiotics Augmentin for 8 more days  Secondary Diagnosis:	Diabetes mellitus  Goal:	Control blood glucose, new onset diabetes with Hba1c of 13.2  Instructions for follow-up, activity and diet:	Please get 38 units of Lantus every night and 12 units of humalog before meals at home. Your fasting blood glucose in the morning needs to be in . Check your blood glucose in a regular basis. Record your blood glucose at home and bring it to your primary doctor. Check your eye and foot with specialists at least once in a year to monitor complications from diabetes. Regular exercise with weight reduction is also very important to manage your blood glucose. If you have symptoms like excessive sweating, tremors, palpitations, or dizziness at anytime, it can be a sign of low blood glucose, which you should react immediately. Drink juice or take candy if you have those symptoms, and stop insulin and seek medical help. Monitor your Hba1c every 3 months with your primary doctor.  Secondary Diagnosis:	Suppurative hidradenitis  Goal:	Treat infection  Instructions for follow-up, activity and diet:	Continue Augmentin as prescribed  Secondary Diagnosis:	Renal transplant recipient  Goal:	Prevent renal dysfunction  Instructions for follow-up, activity and diet:	Take Tacrolimus and prednisone at home dose.  Secondary Diagnosis:	Essential hypertension  Goal:	Monitor blood pressure. Goal <130/90  Instructions for follow-up, activity and diet:	Take nifedipine at home dose*** and monitor your blood pressure in a regular basis.

## 2017-05-26 NOTE — DISCHARGE NOTE ADULT - CARE PROVIDER_API CALL
Yolie Duarte (ZEESHAN), EndocrinologyMetabDiabetes  [address] [place] [address]  Phone: [phone]  Fax: [phone]    Isabell Davidson (DO), Medicine  [facility]  [address] [place] [address]  Phone: [phone]  Fax: [phone] Yolie Duarte (ZEESHAN), EndocrinologyMetabDiabetes  [address] [place] [address]  Phone: [phone]  Fax: [phone]    Isabell Davidson (), Medicine  [facility]  [address] [place] [address]  Phone: [phone]  Fax: [phone]    Eliel Hinds), Internal Medicine; Nephrology  [address] [place] [address]  Phone: [phone]  Fax: [phone]

## 2017-05-26 NOTE — DISCHARGE NOTE ADULT - PROVIDER TOKENS
TOKLISBETH:'[id number]',SHARON:'[id number]' TOKEN:'[id number]:'[id number]'

## 2017-05-26 NOTE — DISCHARGE NOTE ADULT - CONDITIONS AT DISCHARGE
stable stable D/C home Follow up and instructions for D/c meds explained ad given pt verbalized understanding IV Lock removed no S/S of infection noted instructions given regarding about diabetic diet and Insulin Injection Pt verbalized understanding and return demonstration

## 2017-05-26 NOTE — DISCHARGE NOTE ADULT - PLAN OF CARE
Treat infection Please take oral antibiotics for *** more days as prescribed. Control blood glucose, new onset diabetes with Hba1c of 13.2 Please get *** units of Lantus every night and *** units of humalog before meals at home. Your fasting blood glucose in the morning needs to be in . Check your blood glucose in a regular basis. Record your blood glucose at home and bring it to your primary doctor. Check your eye and foot with specialists at least once in a year to monitor complications from diabetes. Regular exercise with weight reduction is also very important to manage your blood glucose. If you have symptoms like excessive sweating, tremors, palpitations, or dizziness at anytime, it can be a sign of low blood glucose, which you should react immediately. Drink juice or take candy if you have those symptoms, and stop insulin and seek medical help. Monitor your Hba1c every 3 months with your primary doctor. Continue augmentin 875mg q 12hrs for 10 more days. Prevent renal dysfunction Take Tacrolimus and prednisone at home dose. Monitor blood pressure. Goal <130/90 Take nifedipine at home dose*** and monitor your blood pressure in a regular basis. Please take oral antibiotics ceftin for 4 days and clindamycin for 5 more days Please get 38 units of Lantus every night and 12 units of humalog before meals at home. Your fasting blood glucose in the morning needs to be in . Check your blood glucose in a regular basis. Record your blood glucose at home and bring it to your primary doctor. Check your eye and foot with specialists at least once in a year to monitor complications from diabetes. Regular exercise with weight reduction is also very important to manage your blood glucose. If you have symptoms like excessive sweating, tremors, palpitations, or dizziness at anytime, it can be a sign of low blood glucose, which you should react immediately. Drink juice or take candy if you have those symptoms, and stop insulin and seek medical help. Monitor your Hba1c every 3 months with your primary doctor. Continue Clindamycin and Ceftin as prescribed Please take oral antibiotics Augmentin for 8 more days Continue Augmentin as prescribed

## 2017-05-26 NOTE — PROGRESS NOTE ADULT - PROBLEM SELECTOR PLAN 5
-BP well controlled with nifedipine. -BP well controlled without nifedipine.  -Monitor BP and restart if BP goes up.

## 2017-05-26 NOTE — CONSULT NOTE ADULT - SUBJECTIVE AND OBJECTIVE BOX
Patient is a 35 yo F who presents with a chief complaint of fevers, diaphoresis, fatigue, headaches, and malaise X several weeks    HPI:  36 year old morbidly obese female from home with PMHx Right Renal Transplant in  2/2 Unspecified Congential Progressive Kidney Disease, on Tacrolimus and Prednisone, HTN, and Gestational Pre-Diabetes, who p/w c/o several weeks of objective fevers 101 - 103 F, diaphoresis, fatigue, headaches, malaise, polydipsia, and polyuria.  ~5 days ago, patient had a break in her skin near her right breast/axilla from shaving with resulting drainage and erythema.  Otherwise, pt was last evaluated by her outpatient PMD in  and denies any personal or family history of DM.  Endorses poor diet and inability to lose weight.  In the ED, pt tachycardic, labs (+) blood glucose > 400, and HbA1C 13.2.     PAST MEDICAL & SURGICAL HISTORY:  As Above  HTN  Gestational Pre-DM  Right Renal Transplant     MEDICATIONS  (STANDING):  insulin lispro (HumaLOG) corrective regimen sliding scale  SubCutaneous three times a day before meals  dextrose 5%. 1000milliLiter(s) IV Continuous <Continuous>  dextrose 50% Injectable 12.5Gram(s) IV Push once  dextrose 50% Injectable 25Gram(s) IV Push once  dextrose 50% Injectable 25Gram(s) IV Push once  sodium chloride 0.9%. 1000milliLiter(s) IV Continuous <Continuous>  cefTRIAXone   IVPB 1Gram(s) IV Intermittent every 24 hours  tacrolimus 3milliGRAM(s) Oral every 12 hours  predniSONE   Tablet 5milliGRAM(s) Oral daily  ergocalciferol 33024Bmgn(s) Oral <User Schedule>  clindamycin IVPB 600milliGRAM(s) IV Intermittent every 8 hours  clindamycin IVPB  IV Intermittent   insulin glargine Injectable (LANTUS) 30Unit(s) SubCutaneous at bedtime  insulin lispro Injectable (HumaLOG) 9Unit(s) SubCutaneous three times a day before meals  fluconAZOLE   Tablet 150milliGRAM(s) Oral once    MEDICATIONS  (PRN):  dextrose Gel 1Dose(s) Oral once PRN Blood Glucose LESS THAN 70 milliGRAM(s)/deciLiter  glucagon  Injectable 1milliGRAM(s) IntraMuscular once PRN Glucose <70 milliGRAM(s)/deciLiter  acetaminophen   Tablet. 650milliGRAM(s) Oral every 6 hours PRN Moderate Pain (4 - 6)      FAMILY HISTORY: Denies    SOCIAL HISTORY: Denies IVDA, Alcohol Abuse, or smoking      REVIEW OF SYSTEMS:  CONSTITUTIONAL: No weight loss  EYES: No eye pain, visual disturbances, or discharge  ENT:  No difficulty hearing, tinnitus, vertigo; No sinus or throat pain  NECK: No pain or stiffness  RESPIRATORY: No cough, wheezing, chills or hemoptysis; No Shortness of Breath  CARDIOVASCULAR: No chest pain, palpitations, passing out, dizziness, or leg swelling  GASTROINTESTINAL: No abdominal or epigastric pain. No nausea, vomiting, or hematemesis; No diarrhea or constipation. No melena or hematochezia.  GENITOURINARY: (+) Polyuria. No dysuria, frequency, hematuria, or incontinence  NEUROLOGICAL: (+) Headaches. No memory loss, loss of strength, numbness, or tremors  SKIN: (+) Right axilla/lateral breast drainage and erythema.  No itching, burning, rashes  LYMPH Nodes: No enlarged glands  ENDOCRINE: (+) Polydipsia. No heat or cold intolerance; No hair loss  MUSCULOSKELETAL: No joint pain or swelling; No muscle, back, or extremity pain  PSYCHIATRIC: No depression, anxiety, mood swings, or difficulty sleeping  HEME/LYMPH: No easy bruising, or bleeding gums  ALLERGY AND IMMUNOLOGIC: No hives or eczema	      Vital Signs Last 24 Hrs  T(C): 36.8, Max: 37.2 (05-25 @ 23:21)  T(F): 98.3, Max: 99 (05-25 @ 23:21)  HR: 83 (78 - 89)  BP: 118/79 (112/82 - 118/79)  BP(mean): --  RR: 18 (16 - 18)  SpO2: 99% (99% - 100%)      Constitutional: WD, WN, NAD  HEENT: NC, AT  Neck:  No JVD, bruits or thyromegaly  Respiratory:  Clear without rales or rhonchi  Cardiovascular:  RR without murmur, rub or gallop.  Gastrointestinal: Obese, soft without hepatosplenomegaly.  Extremities: without cyanosis, clubbing or edema.  Neurological: Oriented  x  3.  No gross sensory or motor defects.      LABS:                        12.3   9.9   )-----------( 270      ( 26 May 2017 06:29 )             38.9     -    138  |  107  |  11  ----------------------------<  296<H>  3.4<L>   |  23  |  1.21    Ca    8.5      26 May 2017 06:29  Phos  2.9       Mg     1.8         TPro  6.8  /  Alb  2.3<L>  /  TBili  0.4  /  DBili  x   /  AST  10  /  ALT  17  /  AlkPhos  96      HbA1C = 13.2       Urinalysis Basic - ( 24 May 2017 20:06 )  Color: Yellow / Appearance: Clear / S.010 / pH: x  Gluc: x / Ketone: Negative  / Bili: Negative / Urobili: Negative   Blood: x / Protein: 30 mg/dL / Nitrite: Negative   Leuk Esterase: Moderate / RBC: 25-50 /HPF / WBC 11-25 /HPF   Sq Epi: x / Non Sq Epi: Few / Bacteria: Moderate /HPF      CAPILLARY BLOOD GLUCOSE  289 (26 May 2017 11:48)  298 (26 May 2017 08:49)  273 (25 May 2017 21:06)  265 (25 May 2017 16:00) Patient is a 35 yo F who presents with a chief complaint of fevers, diaphoresis, fatigue, headaches, and malaise X several weeks    HPI:  36 year old morbidly obese female from home with PMHx Right Renal Transplant in  2/2 Unspecified Congential Progressive Kidney Disease, on Tacrolimus and Prednisone, HTN, and Gestational Pre-Diabetes, who p/w c/o several weeks of objective fevers 101 - 103 F, diaphoresis, fatigue, headaches, malaise, polydipsia, and polyuria.  ~5 days ago, patient had a break in her skin near her right breast/axilla from shaving with resulting drainage and erythema.  Otherwise, pt was last evaluated by her outpatient PMD in  and denies any personal or family history of DM.  Endorses poor diet and inability to lose weight.  In the ED, pt tachycardic, labs (+) blood glucose > 400, and HbA1C 13.2. Pt had no MD visits >1yr    PAST MEDICAL & SURGICAL HISTORY:  As Above  HTN  Gestational Pre-DM  Right Renal Transplant     MEDICATIONS  (STANDING):  insulin lispro (HumaLOG) corrective regimen sliding scale  SubCutaneous three times a day before meals  dextrose 5%. 1000milliLiter(s) IV Continuous <Continuous>  dextrose 50% Injectable 12.5Gram(s) IV Push once  dextrose 50% Injectable 25Gram(s) IV Push once  dextrose 50% Injectable 25Gram(s) IV Push once  sodium chloride 0.9%. 1000milliLiter(s) IV Continuous <Continuous>  cefTRIAXone   IVPB 1Gram(s) IV Intermittent every 24 hours  tacrolimus 3milliGRAM(s) Oral every 12 hours  predniSONE   Tablet 5milliGRAM(s) Oral daily  ergocalciferol 39054Agxp(s) Oral <User Schedule>  clindamycin IVPB 600milliGRAM(s) IV Intermittent every 8 hours  clindamycin IVPB  IV Intermittent   insulin glargine Injectable (LANTUS) 30Unit(s) SubCutaneous at bedtime  insulin lispro Injectable (HumaLOG) 9Unit(s) SubCutaneous three times a day before meals  fluconAZOLE   Tablet 150milliGRAM(s) Oral once    MEDICATIONS  (PRN):  dextrose Gel 1Dose(s) Oral once PRN Blood Glucose LESS THAN 70 milliGRAM(s)/deciLiter  glucagon  Injectable 1milliGRAM(s) IntraMuscular once PRN Glucose <70 milliGRAM(s)/deciLiter  acetaminophen   Tablet. 650milliGRAM(s) Oral every 6 hours PRN Moderate Pain (4 - 6)      FAMILY HISTORY: Denies    SOCIAL HISTORY: Denies IVDA, Alcohol Abuse, or smoking      REVIEW OF SYSTEMS:  CONSTITUTIONAL: No weight loss  EYES: No eye pain, visual disturbances, or discharge  ENT:  No difficulty hearing, tinnitus, vertigo; No sinus or throat pain  NECK: No pain or stiffness  RESPIRATORY: No cough, wheezing, chills or hemoptysis; No Shortness of Breath  CARDIOVASCULAR: No chest pain, palpitations, passing out, dizziness, or leg swelling  GASTROINTESTINAL: No abdominal or epigastric pain. No nausea, vomiting, or hematemesis; No diarrhea or constipation. No melena or hematochezia.  GENITOURINARY: (+) Polyuria. No dysuria, frequency, hematuria, or incontinence  NEUROLOGICAL: (+) Headaches. No memory loss, loss of strength, numbness, or tremors  SKIN: (+) Right axilla/lateral breast drainage and erythema.  No itching, burning, rashes  LYMPH Nodes: No enlarged glands  ENDOCRINE: (+) Polydipsia. No heat or cold intolerance; No hair loss  MUSCULOSKELETAL: No joint pain or swelling; No muscle, back, or extremity pain  PSYCHIATRIC: No depression, anxiety, mood swings, or difficulty sleeping  HEME/LYMPH: No easy bruising, or bleeding gums  ALLERGY AND IMMUNOLOGIC: No hives or eczema	      Vital Signs Last 24 Hrs  T(C): 36.8, Max: 37.2 (05-25 @ 23:21)  T(F): 98.3, Max: 99 (05-25 @ 23:21)  HR: 83 (78 - 89)  BP: 118/79 (112/82 - 118/79)  BP(mean): --  RR: 18 (16 - 18)  SpO2: 99% (99% - 100%)      Constitutional: WD, WN, NAD  HEENT: NC, AT  Neck:  No JVD, bruits or thyromegaly  Respiratory:  Clear without rales or rhonchi  Cardiovascular:  RR without murmur, rub or gallop.  Gastrointestinal: Obese, soft without hepatosplenomegaly.  Extremities: without cyanosis, clubbing or edema.  Neurological: Oriented  x  3.  No gross sensory or motor defects.      LABS:                        12.3   9.9   )-----------( 270      ( 26 May 2017 06:29 )             38.9     -    138  |  107  |  11  ----------------------------<  296<H>  3.4<L>   |  23  |  1.21    Ca    8.5      26 May 2017 06:29  Phos  2.9       Mg     1.8         TPro  6.8  /  Alb  2.3<L>  /  TBili  0.4  /  DBili  x   /  AST  10  /  ALT  17  /  AlkPhos  96      HbA1C = 13.2       Urinalysis Basic - ( 24 May 2017 20:06 )  Color: Yellow / Appearance: Clear / S.010 / pH: x  Gluc: x / Ketone: Negative  / Bili: Negative / Urobili: Negative   Blood: x / Protein: 30 mg/dL / Nitrite: Negative   Leuk Esterase: Moderate / RBC: 25-50 /HPF / WBC 11-25 /HPF   Sq Epi: x / Non Sq Epi: Few / Bacteria: Moderate /HPF      CAPILLARY BLOOD GLUCOSE  289 (26 May 2017 11:48)  298 (26 May 2017 08:49)  273 (25 May 2017 21:06)  265 (25 May 2017 16:00)

## 2017-05-26 NOTE — DISCHARGE NOTE ADULT - MEDICATION SUMMARY - MEDICATIONS TO STOP TAKING
I will STOP taking the medications listed below when I get home from the hospital:    NIFEdipine 60 mg oral tablet, extended release  -- 1 tab(s) by mouth once a day

## 2017-05-26 NOTE — DISCHARGE NOTE ADULT - CARE PROVIDERS DIRECT ADDRESSES
,DirectAddress_Unknown,[email or web address] ,DirectAddress_Unknown,[email or web address],DirectAddress_Unknown

## 2017-05-26 NOTE — CONSULT NOTE ADULT - PROBLEM SELECTOR RECOMMENDATION 9
- New onset Diabetes.   - Accu-cheks remain elevated > 250.  Received 14 U Humalog from [facility] X 24 hours.  Increase Lantus to 32 U sc qhs.  Increase premeal Humalog to 9 U sc tid.  C/w [facility].  - Monitor accu-cheks closely  - Pt counseled on lifestyle modifications and weight loss  - DM education  - Nutrition evaluation

## 2017-05-26 NOTE — PROGRESS NOTE ADULT - ATTENDING COMMENTS
Patient seen/evaluated at bedside. I agree with the resident progress note/outlined plan of care. My independent findings and conclusions are documented.    Symptomatically improved with signficant improvement in suprapubic discomfort and draining lesion of right axilla. Imaging and blood work results discussed with patient. HIV testing non-reactive. Urine cx returned as insignificant, however, sample sent post abx administration. Diabetes education provided as well as insulin administration teaching. Patient would likely benefit from mental health professional referral--> she has some moderate anxiety regarding her diagnosis, new job etc. No evidence of suicidal ideation/homicidal ideation  -endocrinology consult appreciated : lantus/lispro uptitrated  -c/w ceftriaxone  -nutrition consult  -reassurance  -likely discharge expected tomorrow.

## 2017-05-26 NOTE — DISCHARGE NOTE ADULT - MEDICATION SUMMARY - MEDICATIONS TO TAKE
I will START or STAY ON the medications listed below when I get home from the hospital:    Alcohol swabs  -- Apply on skin to affected area 4 times a day  -- Indication: For Diabetes mellitus    Glucometer  -- 1 unit(s) subcutaneous once a day  -- Indication: For Diabetes mellitus    Glucose test strips  -- 1 unit(s) subcutaneous 4 times a day  -- Indication: For Diabetes mellitus    Needles  -- 1 unit(s) subcutaneous 4 times a day  -- Indication: For Diabetes mellitus    predniSONE 5 mg oral tablet  -- 1 tab(s) by mouth once a day  -- Indication: For Transplant    Lantus Solostar Pen 100 units/mL subcutaneous solution  -- 32 unit(s) subcutaneous once a day (at bedtime)  -- Do not drink alcoholic beverages when taking this medication.  It is very important that you take or use this exactly as directed.  Do not skip doses or discontinue unless directed by your doctor.  Keep in refrigerator.  Do not freeze.    -- Indication: For Diabetes mellitus    HumaLOG Cartridge 100 units/mL subcutaneous solution  -- 12 unit(s) subcutaneous 3 times a day (before meals)  -- Do not drink alcoholic beverages when taking this medication.  It is very important that you take or use this exactly as directed.  Do not skip doses or discontinue unless directed by your doctor.  Keep in refrigerator.  Do not freeze.    -- Indication: For Diabetes mellitus    Alcohol Swabs with Benzocaine topical pad  -- Apply on skin to affected area 4 times a day  -- For external use only.    -- Indication: For Diabetes mellitus    Ceftin 250 mg oral tablet  -- 1 tab(s) by mouth 2 times a day  -- Finish all this medication unless otherwise directed by prescriber.  Medication should be taken with plenty of water.  Take with food or milk.    -- Indication: For Hidraadenitis    Prograf 1 mg oral capsule  -- 3 cap(s) by mouth every 12 hours  -- Indication: For Kidney Transplant    clindamycin 300 mg oral capsule  -- 1 cap(s) by mouth every 6 hours  -- Finish all this medication unless otherwise directed by prescriber.  Medication should be taken with plenty of water.    -- Indication: For Hidraadenitis    ergocalciferol 50,000 intl units (1.25 mg) oral capsule  -- 1 cap(s) by mouth once a week  -- Indication: For Vitamin D supplement I will START or STAY ON the medications listed below when I get home from the hospital:    Glucometer  -- 1 unit(s) subcutaneous once a day  -- Indication: For Diabetes mellitus    Glucose test strips  -- 1 unit(s) subcutaneous 4 times a day  -- Indication: For Diabetes mellitus    Needles  -- 1 unit(s) subcutaneous 4 times a day  -- Indication: For Diabetes mellitus    Alcohol swabs  -- Apply on skin to affected area 4 times a day  -- Indication: For Diabetes mellitus    predniSONE 5 mg oral tablet  -- 1 tab(s) by mouth once a day  -- Indication: For Transplant    Lantus Solostar Pen 100 units/mL subcutaneous solution  -- 32 unit(s) subcutaneous once a day (at bedtime)  -- Do not drink alcoholic beverages when taking this medication.  It is very important that you take or use this exactly as directed.  Do not skip doses or discontinue unless directed by your doctor.  Keep in refrigerator.  Do not freeze.    -- Indication: For Diabetes mellitus    HumaLOG Cartridge 100 units/mL subcutaneous solution  -- 12 unit(s) subcutaneous 3 times a day (before meals)  -- Do not drink alcoholic beverages when taking this medication.  It is very important that you take or use this exactly as directed.  Do not skip doses or discontinue unless directed by your doctor.  Keep in refrigerator.  Do not freeze.    -- Indication: For Diabetes mellitus    Alcohol Swabs with Benzocaine topical pad  -- Apply on skin to affected area 4 times a day  -- For external use only.    -- Indication: For Diabetes mellitus    Prograf 1 mg oral capsule  -- 3 cap(s) by mouth every 12 hours  -- Indication: For Kidney Transplant    Augmentin 875 mg-125 mg oral tablet  -- 1 tab(s) by mouth 2 times a day  -- Finish all this medication unless otherwise directed by prescriber.  Take with food or milk.    -- Indication: For Hidradenitis    ergocalciferol 50,000 intl units (1.25 mg) oral capsule  -- 1 cap(s) by mouth once a week  -- Indication: For Vitamin D supplement

## 2017-05-27 VITALS
OXYGEN SATURATION: 98 % | SYSTOLIC BLOOD PRESSURE: 111 MMHG | DIASTOLIC BLOOD PRESSURE: 71 MMHG | TEMPERATURE: 97 F | HEART RATE: 63 BPM | RESPIRATION RATE: 18 BRPM

## 2017-05-27 LAB
ANION GAP SERPL CALC-SCNC: 7 MMOL/L — SIGNIFICANT CHANGE UP (ref 5–17)
BUN SERPL-MCNC: 14 MG/DL — SIGNIFICANT CHANGE UP (ref 7–18)
CALCIUM SERPL-MCNC: 8.9 MG/DL — SIGNIFICANT CHANGE UP (ref 8.4–10.5)
CHLORIDE SERPL-SCNC: 112 MMOL/L — HIGH (ref 96–108)
CO2 SERPL-SCNC: 23 MMOL/L — SIGNIFICANT CHANGE UP (ref 22–31)
CREAT SERPL-MCNC: 1.18 MG/DL — SIGNIFICANT CHANGE UP (ref 0.5–1.3)
GLUCOSE SERPL-MCNC: 166 MG/DL — HIGH (ref 70–99)
MAGNESIUM SERPL-MCNC: 1.8 MG/DL — SIGNIFICANT CHANGE UP (ref 1.6–2.6)
PHOSPHATE SERPL-MCNC: 2.8 MG/DL — SIGNIFICANT CHANGE UP (ref 2.5–4.5)
POTASSIUM SERPL-MCNC: 3.9 MMOL/L — SIGNIFICANT CHANGE UP (ref 3.5–5.3)
POTASSIUM SERPL-SCNC: 3.9 MMOL/L — SIGNIFICANT CHANGE UP (ref 3.5–5.3)
SODIUM SERPL-SCNC: 142 MMOL/L — SIGNIFICANT CHANGE UP (ref 135–145)

## 2017-05-27 PROCEDURE — 99285 EMERGENCY DEPT VISIT HI MDM: CPT | Mod: 25

## 2017-05-27 PROCEDURE — 96374 THER/PROPH/DIAG INJ IV PUSH: CPT

## 2017-05-27 PROCEDURE — 71046 X-RAY EXAM CHEST 2 VIEWS: CPT

## 2017-05-27 PROCEDURE — 84145 PROCALCITONIN (PCT): CPT

## 2017-05-27 PROCEDURE — 84443 ASSAY THYROID STIM HORMONE: CPT

## 2017-05-27 PROCEDURE — 86703 HIV-1/HIV-2 1 RESULT ANTBDY: CPT

## 2017-05-27 PROCEDURE — 87086 URINE CULTURE/COLONY COUNT: CPT

## 2017-05-27 PROCEDURE — 80197 ASSAY OF TACROLIMUS: CPT

## 2017-05-27 PROCEDURE — 85027 COMPLETE CBC AUTOMATED: CPT

## 2017-05-27 PROCEDURE — 83036 HEMOGLOBIN GLYCOSYLATED A1C: CPT

## 2017-05-27 PROCEDURE — 81025 URINE PREGNANCY TEST: CPT

## 2017-05-27 PROCEDURE — 87389 HIV-1 AG W/HIV-1&-2 AB AG IA: CPT

## 2017-05-27 PROCEDURE — 80053 COMPREHEN METABOLIC PANEL: CPT

## 2017-05-27 PROCEDURE — 80048 BASIC METABOLIC PNL TOTAL CA: CPT

## 2017-05-27 PROCEDURE — 80061 LIPID PANEL: CPT

## 2017-05-27 PROCEDURE — 99239 HOSP IP/OBS DSCHRG MGMT >30: CPT

## 2017-05-27 PROCEDURE — 81001 URINALYSIS AUTO W/SCOPE: CPT

## 2017-05-27 PROCEDURE — 87040 BLOOD CULTURE FOR BACTERIA: CPT

## 2017-05-27 PROCEDURE — 83605 ASSAY OF LACTIC ACID: CPT

## 2017-05-27 PROCEDURE — 82306 VITAMIN D 25 HYDROXY: CPT

## 2017-05-27 PROCEDURE — 84100 ASSAY OF PHOSPHORUS: CPT

## 2017-05-27 PROCEDURE — 83735 ASSAY OF MAGNESIUM: CPT

## 2017-05-27 RX ORDER — CEFUROXIME AXETIL 250 MG
1 TABLET ORAL
Qty: 8 | Refills: 0 | OUTPATIENT
Start: 2017-05-27 | End: 2017-05-31

## 2017-05-27 RX ORDER — NIFEDIPINE 30 MG
1 TABLET, EXTENDED RELEASE 24 HR ORAL
Qty: 0 | Refills: 0 | COMMUNITY

## 2017-05-27 RX ORDER — ISOPROPYL ALCOHOL, BENZOCAINE .7; .06 ML/ML; ML/ML
1 SWAB TOPICAL
Qty: 100 | Refills: 0
Start: 2017-05-27 | End: 2017-06-26

## 2017-05-27 RX ORDER — ENOXAPARIN SODIUM 100 MG/ML
32 INJECTION SUBCUTANEOUS
Qty: 1 | Refills: 0
Start: 2017-05-27 | End: 2017-06-26

## 2017-05-27 RX ORDER — ERGOCALCIFEROL 1.25 MG/1
1 CAPSULE ORAL
Qty: 9 | Refills: 0
Start: 2017-05-27 | End: 2017-07-26

## 2017-05-27 RX ORDER — ISOPROPYL ALCOHOL, BENZOCAINE .7; .06 ML/ML; ML/ML
1 SWAB TOPICAL
Qty: 100 | Refills: 0 | OUTPATIENT
Start: 2017-05-27 | End: 2017-06-26

## 2017-05-27 RX ORDER — INSULIN LISPRO 100/ML
12 VIAL (ML) SUBCUTANEOUS
Qty: 1 | Refills: 0 | OUTPATIENT
Start: 2017-05-27 | End: 2017-06-26

## 2017-05-27 RX ADMIN — Medication 9 UNIT(S): at 08:41

## 2017-05-27 RX ADMIN — Medication 100 MILLIGRAM(S): at 14:01

## 2017-05-27 RX ADMIN — Medication 9 UNIT(S): at 12:04

## 2017-05-27 RX ADMIN — TACROLIMUS 3 MILLIGRAM(S): 5 CAPSULE ORAL at 06:14

## 2017-05-27 RX ADMIN — Medication 2: at 12:03

## 2017-05-27 RX ADMIN — Medication 5 MILLIGRAM(S): at 06:14

## 2017-05-27 RX ADMIN — Medication 1: at 08:41

## 2017-05-27 RX ADMIN — TACROLIMUS 3 MILLIGRAM(S): 5 CAPSULE ORAL at 18:01

## 2017-05-27 RX ADMIN — Medication 100 MILLIGRAM(S): at 06:14

## 2017-05-30 LAB
CULTURE RESULTS: SIGNIFICANT CHANGE UP
CULTURE RESULTS: SIGNIFICANT CHANGE UP
SPECIMEN SOURCE: SIGNIFICANT CHANGE UP
SPECIMEN SOURCE: SIGNIFICANT CHANGE UP

## 2018-04-01 ENCOUNTER — EMERGENCY (EMERGENCY)
Facility: HOSPITAL | Age: 38
LOS: 1 days | Discharge: HOME | End: 2018-04-01
Attending: EMERGENCY MEDICINE
Payer: COMMERCIAL

## 2018-04-01 VITALS
TEMPERATURE: 98 F | RESPIRATION RATE: 16 BRPM | WEIGHT: 240.08 LBS | SYSTOLIC BLOOD PRESSURE: 114 MMHG | DIASTOLIC BLOOD PRESSURE: 79 MMHG | HEART RATE: 93 BPM | OXYGEN SATURATION: 99 %

## 2018-04-01 VITALS
SYSTOLIC BLOOD PRESSURE: 127 MMHG | DIASTOLIC BLOOD PRESSURE: 74 MMHG | RESPIRATION RATE: 20 BRPM | OXYGEN SATURATION: 99 % | HEART RATE: 89 BPM

## 2018-04-01 LAB — HCG UR QL: NEGATIVE — SIGNIFICANT CHANGE UP

## 2018-04-01 PROCEDURE — 99283 EMERGENCY DEPT VISIT LOW MDM: CPT | Mod: 25

## 2018-04-01 PROCEDURE — 99283 EMERGENCY DEPT VISIT LOW MDM: CPT

## 2018-04-01 PROCEDURE — 72220 X-RAY EXAM SACRUM TAILBONE: CPT

## 2018-04-01 PROCEDURE — 81025 URINE PREGNANCY TEST: CPT

## 2018-04-01 PROCEDURE — 72220 X-RAY EXAM SACRUM TAILBONE: CPT | Mod: 26

## 2018-04-01 RX ORDER — ACETAMINOPHEN 500 MG
650 TABLET ORAL ONCE
Qty: 0 | Refills: 0 | Status: COMPLETED | OUTPATIENT
Start: 2018-04-01 | End: 2018-04-01

## 2018-04-01 RX ADMIN — Medication 650 MILLIGRAM(S): at 22:13

## 2018-04-01 NOTE — ED PROVIDER NOTE - PROGRESS NOTE DETAILS
xray suggestive of possible coccyx fx, donut, pain relief (tylenol only d/t kidney transplant) and ortho/pmd follow up.

## 2018-04-01 NOTE — ED ADULT NURSE NOTE - OBJECTIVE STATEMENT
pt from home c/o of lower back pain s/p slip and fall 3 days ago pt is alert awake oriented x3 ambulatory with steady gait pt states "I hurt my tailbone when I felt"

## 2018-04-01 NOTE — ED PROVIDER NOTE - OBJECTIVE STATEMENT
38 y/o female, pmhx kidney transplant (1999), DM2 (Hemalog), c/o tailbone pain s/p trip fall x3 days. Taking Tylenol without relief.

## 2018-04-25 ENCOUNTER — EMERGENCY (EMERGENCY)
Facility: HOSPITAL | Age: 38
LOS: 1 days | Discharge: HOME | End: 2018-04-25
Attending: EMERGENCY MEDICINE
Payer: COMMERCIAL

## 2018-04-25 VITALS
SYSTOLIC BLOOD PRESSURE: 126 MMHG | OXYGEN SATURATION: 95 % | HEIGHT: 67 IN | RESPIRATION RATE: 18 BRPM | WEIGHT: 244.93 LBS | HEART RATE: 87 BPM | DIASTOLIC BLOOD PRESSURE: 88 MMHG | TEMPERATURE: 98 F

## 2018-04-25 PROCEDURE — 99283 EMERGENCY DEPT VISIT LOW MDM: CPT | Mod: 25

## 2018-04-25 PROCEDURE — 71046 X-RAY EXAM CHEST 2 VIEWS: CPT | Mod: 26

## 2018-04-25 PROCEDURE — 71046 X-RAY EXAM CHEST 2 VIEWS: CPT

## 2018-04-25 RX ORDER — SODIUM CHLORIDE 0.65 %
1 AEROSOL, SPRAY (ML) NASAL
Qty: 1 | Refills: 0
Start: 2018-04-25 | End: 2018-04-29

## 2018-04-25 RX ADMIN — Medication 1 TABLET(S): at 01:51

## 2018-04-25 NOTE — ED PROVIDER NOTE - MEDICAL DECISION MAKING DETAILS
38yo F s/p renal transplant and IDDM presents with L nasal congestion, cough and L facial pain. Exam cw sinusitis. Will obtain CXR, given augmentin for sinusitis.

## 2018-04-25 NOTE — ED PROVIDER NOTE - OBJECTIVE STATEMENT
36 y/o F pt w/ significant PMHx of renal transplant, IDDM and no significant PSHx presents to ED c/o nasal congestion localized to the L nostril for x2 weeks. Pt states that in the last 2 days, sx have been worsening and that the she is also experiencing dry, non-productive cough, L facial pain, and ear pain.  Pt reports that she has been taking Afrin spray for the past week w/o improvement of nasal congestion. Pt denies trouble breathing, fever at home, or any other complaints. NKDA.

## 2019-01-13 ENCOUNTER — INPATIENT (INPATIENT)
Facility: HOSPITAL | Age: 39
LOS: 3 days | Discharge: HOME | DRG: 854 | End: 2019-01-17
Attending: INTERNAL MEDICINE | Admitting: INTERNAL MEDICINE
Payer: COMMERCIAL

## 2019-01-13 ENCOUNTER — TRANSCRIPTION ENCOUNTER (OUTPATIENT)
Age: 39
End: 2019-01-13

## 2019-01-13 VITALS
HEIGHT: 66 IN | SYSTOLIC BLOOD PRESSURE: 98 MMHG | HEART RATE: 98 BPM | DIASTOLIC BLOOD PRESSURE: 64 MMHG | RESPIRATION RATE: 20 BRPM | WEIGHT: 240.08 LBS | OXYGEN SATURATION: 97 % | TEMPERATURE: 98 F

## 2019-01-13 DIAGNOSIS — Z29.9 ENCOUNTER FOR PROPHYLACTIC MEASURES, UNSPECIFIED: ICD-10-CM

## 2019-01-13 DIAGNOSIS — I25.10 ATHEROSCLEROTIC HEART DISEASE OF NATIVE CORONARY ARTERY WITHOUT ANGINA PECTORIS: ICD-10-CM

## 2019-01-13 DIAGNOSIS — L03.90 CELLULITIS, UNSPECIFIED: ICD-10-CM

## 2019-01-13 DIAGNOSIS — Z94.0 KIDNEY TRANSPLANT STATUS: ICD-10-CM

## 2019-01-13 DIAGNOSIS — N17.9 ACUTE KIDNEY FAILURE, UNSPECIFIED: ICD-10-CM

## 2019-01-13 DIAGNOSIS — E11.9 TYPE 2 DIABETES MELLITUS WITHOUT COMPLICATIONS: ICD-10-CM

## 2019-01-13 LAB
ALBUMIN SERPL ELPH-MCNC: 2.8 G/DL — LOW (ref 3.5–5)
ALP SERPL-CCNC: 134 U/L — HIGH (ref 40–120)
ALT FLD-CCNC: 16 U/L DA — SIGNIFICANT CHANGE UP (ref 10–60)
ANION GAP SERPL CALC-SCNC: 12 MMOL/L — SIGNIFICANT CHANGE UP (ref 5–17)
APPEARANCE UR: ABNORMAL
APTT BLD: 28.1 SEC — SIGNIFICANT CHANGE UP (ref 27.5–36.3)
AST SERPL-CCNC: 4 U/L — LOW (ref 10–40)
BILIRUB SERPL-MCNC: 0.8 MG/DL — SIGNIFICANT CHANGE UP (ref 0.2–1.2)
BILIRUB UR-MCNC: NEGATIVE — SIGNIFICANT CHANGE UP
BUN SERPL-MCNC: 19 MG/DL — HIGH (ref 7–18)
CALCIUM SERPL-MCNC: 8.5 MG/DL — SIGNIFICANT CHANGE UP (ref 8.4–10.5)
CHLORIDE SERPL-SCNC: 104 MMOL/L — SIGNIFICANT CHANGE UP (ref 96–108)
CO2 SERPL-SCNC: 21 MMOL/L — LOW (ref 22–31)
COLOR SPEC: YELLOW — SIGNIFICANT CHANGE UP
CREAT SERPL-MCNC: 2.33 MG/DL — HIGH (ref 0.5–1.3)
DIFF PNL FLD: ABNORMAL
GLUCOSE BLDC GLUCOMTR-MCNC: 248 MG/DL — HIGH (ref 70–99)
GLUCOSE BLDC GLUCOMTR-MCNC: 270 MG/DL — HIGH (ref 70–99)
GLUCOSE SERPL-MCNC: 323 MG/DL — HIGH (ref 70–99)
GLUCOSE UR QL: 250
HCG SERPL-ACNC: <1 MIU/ML — SIGNIFICANT CHANGE UP
HCT VFR BLD CALC: 36.5 % — SIGNIFICANT CHANGE UP (ref 34.5–45)
HGB BLD-MCNC: 11.6 G/DL — SIGNIFICANT CHANGE UP (ref 11.5–15.5)
INR BLD: 1.2 RATIO — HIGH (ref 0.88–1.16)
KETONES UR-MCNC: NEGATIVE — SIGNIFICANT CHANGE UP
LACTATE SERPL-SCNC: 1.9 MMOL/L — SIGNIFICANT CHANGE UP (ref 0.7–2)
LEUKOCYTE ESTERASE UR-ACNC: ABNORMAL
LYMPHOCYTES # BLD AUTO: 10 % — LOW (ref 13–44)
MCHC RBC-ENTMCNC: 27.3 PG — SIGNIFICANT CHANGE UP (ref 27–34)
MCHC RBC-ENTMCNC: 32 GM/DL — SIGNIFICANT CHANGE UP (ref 32–36)
MCV RBC AUTO: 85.5 FL — SIGNIFICANT CHANGE UP (ref 80–100)
MONOCYTES NFR BLD AUTO: 4 % — SIGNIFICANT CHANGE UP (ref 2–14)
NEUTROPHILS NFR BLD AUTO: 86 % — HIGH (ref 43–77)
NITRITE UR-MCNC: NEGATIVE — SIGNIFICANT CHANGE UP
NT-PROBNP SERPL-SCNC: 629 PG/ML — HIGH (ref 0–125)
PH UR: 5 — SIGNIFICANT CHANGE UP (ref 5–8)
PLATELET # BLD AUTO: 284 K/UL — SIGNIFICANT CHANGE UP (ref 150–400)
POTASSIUM SERPL-MCNC: 4 MMOL/L — SIGNIFICANT CHANGE UP (ref 3.5–5.3)
POTASSIUM SERPL-SCNC: 4 MMOL/L — SIGNIFICANT CHANGE UP (ref 3.5–5.3)
PROT SERPL-MCNC: 8.1 G/DL — SIGNIFICANT CHANGE UP (ref 6–8.3)
PROT UR-MCNC: 30 MG/DL
PROTHROM AB SERPL-ACNC: 13.4 SEC — HIGH (ref 10–12.9)
RBC # BLD: 4.27 M/UL — SIGNIFICANT CHANGE UP (ref 3.8–5.2)
RBC # FLD: 12 % — SIGNIFICANT CHANGE UP (ref 10.3–14.5)
SODIUM SERPL-SCNC: 137 MMOL/L — SIGNIFICANT CHANGE UP (ref 135–145)
SP GR SPEC: 1.01 — SIGNIFICANT CHANGE UP (ref 1.01–1.02)
TROPONIN I SERPL-MCNC: <0.015 NG/ML — SIGNIFICANT CHANGE UP (ref 0–0.04)
UROBILINOGEN FLD QL: NEGATIVE — SIGNIFICANT CHANGE UP
WBC # BLD: 26.3 K/UL — HIGH (ref 3.8–10.5)
WBC # FLD AUTO: 26.3 K/UL — HIGH (ref 3.8–10.5)

## 2019-01-13 PROCEDURE — 71046 X-RAY EXAM CHEST 2 VIEWS: CPT | Mod: 26

## 2019-01-13 PROCEDURE — 99285 EMERGENCY DEPT VISIT HI MDM: CPT

## 2019-01-13 PROCEDURE — 93010 ELECTROCARDIOGRAM REPORT: CPT

## 2019-01-13 RX ORDER — METOPROLOL TARTRATE 50 MG
25 TABLET ORAL DAILY
Qty: 0 | Refills: 0 | Status: DISCONTINUED | OUTPATIENT
Start: 2019-01-13 | End: 2019-01-17

## 2019-01-13 RX ORDER — ACETAMINOPHEN 500 MG
1000 TABLET ORAL ONCE
Qty: 0 | Refills: 0 | Status: COMPLETED | OUTPATIENT
Start: 2019-01-13 | End: 2019-01-13

## 2019-01-13 RX ORDER — NIFEDIPINE 30 MG
60 TABLET, EXTENDED RELEASE 24 HR ORAL DAILY
Qty: 0 | Refills: 0 | Status: DISCONTINUED | OUTPATIENT
Start: 2019-01-13 | End: 2019-01-17

## 2019-01-13 RX ORDER — PIPERACILLIN AND TAZOBACTAM 4; .5 G/20ML; G/20ML
3.38 INJECTION, POWDER, LYOPHILIZED, FOR SOLUTION INTRAVENOUS ONCE
Qty: 0 | Refills: 0 | Status: COMPLETED | OUTPATIENT
Start: 2019-01-13 | End: 2019-01-14

## 2019-01-13 RX ORDER — ATORVASTATIN CALCIUM 80 MG/1
80 TABLET, FILM COATED ORAL AT BEDTIME
Qty: 0 | Refills: 0 | Status: DISCONTINUED | OUTPATIENT
Start: 2019-01-13 | End: 2019-01-17

## 2019-01-13 RX ORDER — TICAGRELOR 90 MG/1
60 TABLET ORAL
Qty: 0 | Refills: 0 | Status: DISCONTINUED | OUTPATIENT
Start: 2019-01-13 | End: 2019-01-14

## 2019-01-13 RX ORDER — SODIUM CHLORIDE 9 MG/ML
500 INJECTION INTRAMUSCULAR; INTRAVENOUS; SUBCUTANEOUS ONCE
Qty: 0 | Refills: 0 | Status: COMPLETED | OUTPATIENT
Start: 2019-01-13 | End: 2019-01-13

## 2019-01-13 RX ORDER — ACETAMINOPHEN 500 MG
650 TABLET ORAL ONCE
Qty: 0 | Refills: 0 | Status: COMPLETED | OUTPATIENT
Start: 2019-01-13 | End: 2019-01-13

## 2019-01-13 RX ORDER — INSULIN GLARGINE 100 [IU]/ML
30 INJECTION, SOLUTION SUBCUTANEOUS AT BEDTIME
Qty: 0 | Refills: 0 | Status: DISCONTINUED | OUTPATIENT
Start: 2019-01-13 | End: 2019-01-15

## 2019-01-13 RX ORDER — INSULIN LISPRO 100/ML
VIAL (ML) SUBCUTANEOUS
Qty: 0 | Refills: 0 | Status: DISCONTINUED | OUTPATIENT
Start: 2019-01-13 | End: 2019-01-17

## 2019-01-13 RX ORDER — TACROLIMUS 5 MG/1
3 CAPSULE ORAL EVERY 12 HOURS
Qty: 0 | Refills: 0 | Status: DISCONTINUED | OUTPATIENT
Start: 2019-01-13 | End: 2019-01-17

## 2019-01-13 RX ORDER — VANCOMYCIN HCL 1 G
1500 VIAL (EA) INTRAVENOUS ONCE
Qty: 0 | Refills: 0 | Status: COMPLETED | OUTPATIENT
Start: 2019-01-13 | End: 2019-01-13

## 2019-01-13 RX ORDER — INSULIN LISPRO 100/ML
12 VIAL (ML) SUBCUTANEOUS
Qty: 0 | Refills: 0 | Status: DISCONTINUED | OUTPATIENT
Start: 2019-01-13 | End: 2019-01-16

## 2019-01-13 RX ORDER — INSULIN LISPRO 100/ML
VIAL (ML) SUBCUTANEOUS AT BEDTIME
Qty: 0 | Refills: 0 | Status: DISCONTINUED | OUTPATIENT
Start: 2019-01-13 | End: 2019-01-16

## 2019-01-13 RX ORDER — ASPIRIN/CALCIUM CARB/MAGNESIUM 324 MG
81 TABLET ORAL DAILY
Qty: 0 | Refills: 0 | Status: DISCONTINUED | OUTPATIENT
Start: 2019-01-13 | End: 2019-01-17

## 2019-01-13 RX ORDER — SODIUM CHLORIDE 9 MG/ML
1000 INJECTION INTRAMUSCULAR; INTRAVENOUS; SUBCUTANEOUS
Qty: 0 | Refills: 0 | Status: DISCONTINUED | OUTPATIENT
Start: 2019-01-13 | End: 2019-01-14

## 2019-01-13 RX ADMIN — Medication 400 MILLIGRAM(S): at 16:17

## 2019-01-13 RX ADMIN — Medication 650 MILLIGRAM(S): at 23:37

## 2019-01-13 RX ADMIN — Medication 650 MILLIGRAM(S): at 22:25

## 2019-01-13 RX ADMIN — SODIUM CHLORIDE 500 MILLILITER(S): 9 INJECTION INTRAMUSCULAR; INTRAVENOUS; SUBCUTANEOUS at 15:38

## 2019-01-13 RX ADMIN — Medication 100 MILLIGRAM(S): at 15:39

## 2019-01-13 NOTE — H&P ADULT - PROBLEM SELECTOR PLAN 5
on lantus 30 units, Humalog 12 units TID at home   will start on home dose insulin and sliding scale   follow HbA1c level in 1999   on tacrolimus and prednisone 5 mg   continue home medications

## 2019-01-13 NOTE — H&P ADULT - PROBLEM SELECTOR PLAN 2
s/p LAD stent in dec, 2018   on brilinta and aspirin  at [place] - Dr. Joel Herndon SOB for about last month for which she was following up with her PCP and was told that it is 2/2 anxiety.   most likely form pain at this time  Ddimer 516  T1 and T2 negative with recent cath - on aspirin and brilinta

## 2019-01-13 NOTE — H&P ADULT - PROBLEM SELECTOR PLAN 4
in 1999   on tacrolimus and prednisone 5 mg   continue home medications BUN/Cr 19/2.33 (baseline 14/1.18)  pre-renal vs worsening renal function given transplant status   will give fluid challenge

## 2019-01-13 NOTE — H&P ADULT - PROBLEM SELECTOR PLAN 7
IMPROVE VTE Individual Risk Assessment          RISK                                                          Points  [  ] Previous VTE                                                3  [  ] Thrombophilia                                             2  [  ] Lower limb paralysis                                   2        (unable to hold up >15 seconds)    [  ] Current Cancer                                             2         (within 6 months)  [ x ] Immobilization > 24 hrs                              1  [  ] ICU/CCU stay > 24 hours                             1  [  ] Age > 60                                                         1    IMPROVE VTE Score: 1  no need for chemical anticoagulation

## 2019-01-13 NOTE — H&P ADULT - HISTORY OF PRESENT ILLNESS
38/F with PMH of renal transplant in 1999, DM, CAD s/p stent LAD Dec, 2018 (on aspirin and Brilinta- [place]) came after fever and cellulitis of right shoulder started 3 days ago. 38/F with PMH of renal transplant in 1999, DM, CAD s/p stent LAD Dec, 2018 (on aspirin and Brilinta- [place]) came after fever and cellulitis of right shoulder started 3 days ago. Patient mentions that she was having SOB for about last month for which she was following up with her PCP and was told that it is 2/2 anxiety. About 2-3 days ago she started having subjective fever and generalized weakness about day later her son noted small pimple on her back which got worse to diffuse cellulitis around right scapular region over next 12 hours. She noted that area was red, and warm. Also had fever of 102.5 with chills for which she took tylenol and rested but next day she continued to have episodes of fever which brought her to hospital. Denies nausea, vomiting, diarrhea, abdominal pain, chest pain, palpitations, dizziness, headache, cough, wheezing, joint pain or swelling.

## 2019-01-13 NOTE — ED PROVIDER NOTE - OBJECTIVE STATEMENT
39 y/o F with PMHx of kidney transplant in 1999, recent stent in 12/2018 presents to the ED with complaints of intermittent chest pain and shortness of breath x 2 weeks. Patient reports associated lightheadedness. Patient with redness and swelling to R scapula. Denies cough, nausea, vomiting, leg pain, leg swelling, room spinning, recent sick contacts or any other acute complaints. NKDA.

## 2019-01-13 NOTE — ED ADULT NURSE NOTE - OBJECTIVE STATEMENT
Pt AOx4, ambulatory, h/o uncontrolled diabetes, kidney transplant, stent placement in 12/2018, c/o pain, redness, swelling and fever of abscess to right scapula worsening since friday. Pt endorses intermittent SOB.

## 2019-01-13 NOTE — ED PROVIDER NOTE - PROGRESS NOTE DETAILS
On bedside US, patient right scapula + cobble stoning, no discrete area of drainable fluid collection. patient endorses she does not have a specific nephrologist she follows. creatiniene lveated. concern for infection. admitted to dr cruz

## 2019-01-13 NOTE — ED PROVIDER NOTE - MEDICAL DECISION MAKING DETAILS
37 y/o M presents with chest pain and shortness of breath, otherwise with history of stent. Will obtain labs and CXR. Fluid hydration and reassess. Exam is significant for cellulitis. Will give antibiotics and check ultrasound for underlying abscess.

## 2019-01-13 NOTE — H&P ADULT - NSHPLABSRESULTS_GEN_ALL_CORE
Vital Signs Last 24 Hrs  T(C): 36.8 (2019 20:14), Max: 37.4 (2019 15:33)  T(F): 98.3 (2019 20:14), Max: 99.4 (2019 15:33)  HR: 92 (2019 20:14) (92 - 98)  BP: 90/49 (2019 20:14) (90/49 - 98/64)  RR: 20 (2019 20:14) (20 - 20)  SpO2: 100% (2019 20:14) (97% - 100%)                            11.6   26.3  )-----------( 284      ( 2019 15:45 )             36.5           137  |  104  |  19<H>  ----------------------------<  323<H>  4.0   |  21<L>  |  2.33<H>    Ca    8.5      2019 15:45    TPro  8.1  /  Alb  2.8<L>  /  TBili  0.8  /  DBili  x   /  AST  4<L>  /  ALT  16  /  AlkPhos  134<H>                Urinalysis Basic - ( 2019 19:47 )    Color: Yellow / Appearance: Slightly Turbid / S.015 / pH: x  Gluc: x / Ketone: Negative  / Bili: Negative / Urobili: Negative   Blood: x / Protein: 30 mg/dL / Nitrite: Negative   Leuk Esterase: Moderate / RBC: 10-25 /HPF / WBC >50 /HPF   Sq Epi: x / Non Sq Epi: Moderate /HPF / Bacteria: Many /HPF        PT/INR - ( 2019 15:45 )   PT: 13.4 sec;   INR: 1.20 ratio         PTT - ( 2019 15:45 )  PTT:28.1 sec    Lactate Trend   @ 15:45 Lactate:1.9       CARDIAC MARKERS ( 2019 15:45 )  <0.015 ng/mL / x     / x     / x     / x            CAPILLARY BLOOD GLUCOSE      POCT Blood Glucose.: 248 mg/dL (2019 21:33)

## 2019-01-13 NOTE — H&P ADULT - PROBLEM SELECTOR PLAN 6
IMPROVE VTE Individual Risk Assessment          RISK                                                          Points  [  ] Previous VTE                                                3  [  ] Thrombophilia                                             2  [  ] Lower limb paralysis                                   2        (unable to hold up >15 seconds)    [  ] Current Cancer                                             2         (within 6 months)  [ x ] Immobilization > 24 hrs                              1  [  ] ICU/CCU stay > 24 hours                             1  [  ] Age > 60                                                         1    IMPROVE VTE Score: 1  no need for chemical anticoagulation on lantus 30 units, Humalog 12 units TID at home   will start on home dose insulin and sliding scale   follow HbA1c level

## 2019-01-13 NOTE — H&P ADULT - NSHPPHYSICALEXAM_GEN_ALL_CORE
· Constitutional	obese female in pain   · Eyes	conjuctivae clear  · ENMT	No oral lesions; no gross abnormalities  · Neck	No bruits; no thyromegaly or nodules   · Back	erythematous area about 10 cm in diameter with central swelling noted - tender to touch   · Respiratory	Breath Sounds equal & clear to percussion & auscultation, no accessory muscle use  · Cardiovascular	Regular rate & rhythm, normal S1, S2; no murmurs, gallops or rubs; no S3, S4  · Gastrointestinal	Soft, non-tender, no hepatosplenomegaly, normal bowel sounds  · Extremities	No cyanosis, clubbing or edema   · Neurological	Alert & oriented; no sensory, motor or coordination deficits, normal reflexes  · Musculoskeletal	No joint pain, swelling or deformity; no limitation of movement

## 2019-01-13 NOTE — H&P ADULT - PROBLEM SELECTOR PLAN 3
BUN/Cr 19/2.33 (baseline 14/1.18)  pre-renal vs worsening renal function given transplant status   will give fluid challenge s/p LAD stent in dec, 2018   on brilinta and aspirin  at [facility] - Dr. Joel Herndon

## 2019-01-13 NOTE — H&P ADULT - PROBLEM SELECTOR PLAN 1
presented with right shoulder erythema, swelling and pain started 2 days ago   s/p clinda in ED   blood and urine cultures were sent   will give one dose of vanco and zosyn - given worsening renal function in setting of renal transplant  ID Dr. Ovalles

## 2019-01-13 NOTE — ED ADULT TRIAGE NOTE - CHIEF COMPLAINT QUOTE
C/o uncontrolled blood sugar.  c/o painful boil to back x 1 week.  H/O cardiac stent.  EKG, FS requested

## 2019-01-13 NOTE — H&P ADULT - ASSESSMENT
In ED:   vitals: 90/49, 92, 98.3, 20 (100)  labs pertinent for BUN/Cr 19/2.33 (baseline 14/1.18)  UA positive   lactate 1.9  chest xray: clear   s/p 1 dose of clinda in ED   blood and urine cultures sent   500 cc bolus given 38/F with PMH of renal transplant in 1999, DM, CAD s/p stent LAD Dec, 2018 (on aspirin and Brilinta- [place]) came after fever and cellulitis of right shoulder started 3 days ago. Patient mentions that she was having SOB for about last month for which she was following up with her PCP and was told that it is 2/2 anxiety. About 2-3 days ago she started having subjective fever and generalized weakness about day later her son noted small pimple on her back which got worse to diffuse cellulitis around right scapular region over next 12 hours. She noted that area was red, and warm. Also had fever of 102.5 with chills for which she took tylenol and rested but next day she continued to have episodes of fever which brought her to hospital. Denies nausea, vomiting, diarrhea, abdominal pain, chest pain, palpitations, dizziness, headache, cough, wheezing, joint pain or swelling.    In ED:   vitals: 90/49, 92, 98.3, 20 (100)  labs pertinent for BUN/Cr 19/2.33 (baseline 14/1.18)  UA positive   lactate 1.9  chest xray: clear   s/p 1 dose of clinda in ED   blood and urine cultures sent   500 cc bolus given

## 2019-01-14 ENCOUNTER — RESULT REVIEW (OUTPATIENT)
Age: 39
End: 2019-01-14

## 2019-01-14 DIAGNOSIS — R06.02 SHORTNESS OF BREATH: ICD-10-CM

## 2019-01-14 DIAGNOSIS — L02.91 CUTANEOUS ABSCESS, UNSPECIFIED: ICD-10-CM

## 2019-01-14 DIAGNOSIS — N39.0 URINARY TRACT INFECTION, SITE NOT SPECIFIED: ICD-10-CM

## 2019-01-14 LAB
24R-OH-CALCIDIOL SERPL-MCNC: 8 NG/ML — LOW (ref 30–80)
ANION GAP SERPL CALC-SCNC: 12 MMOL/L — SIGNIFICANT CHANGE UP (ref 5–17)
ANION GAP SERPL CALC-SCNC: 9 MMOL/L — SIGNIFICANT CHANGE UP (ref 5–17)
BASOPHILS # BLD AUTO: 0.1 K/UL — SIGNIFICANT CHANGE UP (ref 0–0.2)
BASOPHILS NFR BLD AUTO: 0.4 % — SIGNIFICANT CHANGE UP (ref 0–2)
BUN SERPL-MCNC: 19 MG/DL — HIGH (ref 7–18)
BUN SERPL-MCNC: 21 MG/DL — HIGH (ref 7–18)
CALCIUM SERPL-MCNC: 7.8 MG/DL — LOW (ref 8.4–10.5)
CALCIUM SERPL-MCNC: 8.1 MG/DL — LOW (ref 8.4–10.5)
CHLORIDE SERPL-SCNC: 102 MMOL/L — SIGNIFICANT CHANGE UP (ref 96–108)
CHLORIDE SERPL-SCNC: 103 MMOL/L — SIGNIFICANT CHANGE UP (ref 96–108)
CHOLEST SERPL-MCNC: 96 MG/DL — SIGNIFICANT CHANGE UP (ref 10–199)
CK MB BLD-MCNC: <2.2 % — SIGNIFICANT CHANGE UP (ref 0–3.5)
CK MB CFR SERPL CALC: <1 NG/ML — SIGNIFICANT CHANGE UP (ref 0–3.6)
CK SERPL-CCNC: 46 U/L — SIGNIFICANT CHANGE UP (ref 21–215)
CO2 SERPL-SCNC: 21 MMOL/L — LOW (ref 22–31)
CO2 SERPL-SCNC: 21 MMOL/L — LOW (ref 22–31)
CREAT SERPL-MCNC: 2.24 MG/DL — HIGH (ref 0.5–1.3)
CREAT SERPL-MCNC: 2.42 MG/DL — HIGH (ref 0.5–1.3)
D DIMER BLD IA.RAPID-MCNC: 516 NG/ML DDU — HIGH
EOSINOPHIL # BLD AUTO: 0.3 K/UL — SIGNIFICANT CHANGE UP (ref 0–0.5)
EOSINOPHIL NFR BLD AUTO: 1.1 % — SIGNIFICANT CHANGE UP (ref 0–6)
FOLATE SERPL-MCNC: 6.9 NG/ML — SIGNIFICANT CHANGE UP
GLUCOSE BLDC GLUCOMTR-MCNC: 170 MG/DL — HIGH (ref 70–99)
GLUCOSE BLDC GLUCOMTR-MCNC: 245 MG/DL — HIGH (ref 70–99)
GLUCOSE BLDC GLUCOMTR-MCNC: 249 MG/DL — HIGH (ref 70–99)
GLUCOSE BLDC GLUCOMTR-MCNC: 363 MG/DL — HIGH (ref 70–99)
GLUCOSE BLDC GLUCOMTR-MCNC: 376 MG/DL — HIGH (ref 70–99)
GLUCOSE SERPL-MCNC: 320 MG/DL — HIGH (ref 70–99)
GLUCOSE SERPL-MCNC: 328 MG/DL — HIGH (ref 70–99)
HBA1C BLD-MCNC: 10.9 % — HIGH (ref 4–5.6)
HCT VFR BLD CALC: 31.5 % — LOW (ref 34.5–45)
HDLC SERPL-MCNC: 41 MG/DL — LOW
HGB BLD-MCNC: 10.2 G/DL — LOW (ref 11.5–15.5)
LIPID PNL WITH DIRECT LDL SERPL: 20 MG/DL — SIGNIFICANT CHANGE UP
LYMPHOCYTES # BLD AUTO: 16.4 % — SIGNIFICANT CHANGE UP (ref 13–44)
LYMPHOCYTES # BLD AUTO: 3.9 K/UL — HIGH (ref 1–3.3)
MAGNESIUM SERPL-MCNC: 1.2 MG/DL — LOW (ref 1.6–2.6)
MCHC RBC-ENTMCNC: 27.4 PG — SIGNIFICANT CHANGE UP (ref 27–34)
MCHC RBC-ENTMCNC: 32.3 GM/DL — SIGNIFICANT CHANGE UP (ref 32–36)
MCV RBC AUTO: 85 FL — SIGNIFICANT CHANGE UP (ref 80–100)
MONOCYTES # BLD AUTO: 1 K/UL — HIGH (ref 0–0.9)
MONOCYTES NFR BLD AUTO: 4.3 % — SIGNIFICANT CHANGE UP (ref 2–14)
NEUTROPHILS # BLD AUTO: 18.3 K/UL — HIGH (ref 1.8–7.4)
NEUTROPHILS NFR BLD AUTO: 77.7 % — HIGH (ref 43–77)
PHOSPHATE SERPL-MCNC: 2.8 MG/DL — SIGNIFICANT CHANGE UP (ref 2.5–4.5)
PLATELET # BLD AUTO: 294 K/UL — SIGNIFICANT CHANGE UP (ref 150–400)
POTASSIUM SERPL-MCNC: 3.3 MMOL/L — LOW (ref 3.5–5.3)
POTASSIUM SERPL-MCNC: 3.7 MMOL/L — SIGNIFICANT CHANGE UP (ref 3.5–5.3)
POTASSIUM SERPL-SCNC: 3.3 MMOL/L — LOW (ref 3.5–5.3)
POTASSIUM SERPL-SCNC: 3.7 MMOL/L — SIGNIFICANT CHANGE UP (ref 3.5–5.3)
RBC # BLD: 3.7 M/UL — LOW (ref 3.8–5.2)
RBC # FLD: 12 % — SIGNIFICANT CHANGE UP (ref 10.3–14.5)
SODIUM SERPL-SCNC: 133 MMOL/L — LOW (ref 135–145)
SODIUM SERPL-SCNC: 135 MMOL/L — SIGNIFICANT CHANGE UP (ref 135–145)
TACROLIMUS SERPL-MCNC: 18.5 NG/ML — SIGNIFICANT CHANGE UP
TOTAL CHOLESTEROL/HDL RATIO MEASUREMENT: 2.3 RATIO — LOW (ref 3.3–7.1)
TRIGL SERPL-MCNC: 177 MG/DL — HIGH (ref 10–149)
TROPONIN I SERPL-MCNC: <0.015 NG/ML — SIGNIFICANT CHANGE UP (ref 0–0.04)
TSH SERPL-MCNC: 0.8 UU/ML — SIGNIFICANT CHANGE UP (ref 0.34–4.82)
VIT B12 SERPL-MCNC: 522 PG/ML — SIGNIFICANT CHANGE UP (ref 232–1245)
WBC # BLD: 23.6 K/UL — HIGH (ref 3.8–10.5)
WBC # FLD AUTO: 23.6 K/UL — HIGH (ref 3.8–10.5)

## 2019-01-14 PROCEDURE — 10061 I&D ABSCESS COMP/MULTIPLE: CPT

## 2019-01-14 PROCEDURE — 88304 TISSUE EXAM BY PATHOLOGIST: CPT | Mod: 26

## 2019-01-14 PROCEDURE — ZZZZZ: CPT

## 2019-01-14 RX ORDER — SODIUM CHLORIDE 9 MG/ML
1000 INJECTION, SOLUTION INTRAVENOUS
Qty: 0 | Refills: 0 | Status: DISCONTINUED | OUTPATIENT
Start: 2019-01-14 | End: 2019-01-15

## 2019-01-14 RX ORDER — OXYCODONE AND ACETAMINOPHEN 5; 325 MG/1; MG/1
1 TABLET ORAL ONCE
Qty: 0 | Refills: 0 | Status: DISCONTINUED | OUTPATIENT
Start: 2019-01-14 | End: 2019-01-14

## 2019-01-14 RX ORDER — AMPICILLIN SODIUM AND SULBACTAM SODIUM 250; 125 MG/ML; MG/ML
3 INJECTION, POWDER, FOR SUSPENSION INTRAMUSCULAR; INTRAVENOUS ONCE
Qty: 0 | Refills: 0 | Status: COMPLETED | OUTPATIENT
Start: 2019-01-14 | End: 2019-01-14

## 2019-01-14 RX ORDER — ONDANSETRON 8 MG/1
4 TABLET, FILM COATED ORAL ONCE
Qty: 0 | Refills: 0 | Status: DISCONTINUED | OUTPATIENT
Start: 2019-01-14 | End: 2019-01-15

## 2019-01-14 RX ORDER — TICAGRELOR 90 MG/1
90 TABLET ORAL
Qty: 0 | Refills: 0 | Status: DISCONTINUED | OUTPATIENT
Start: 2019-01-14 | End: 2019-01-17

## 2019-01-14 RX ORDER — HYDROMORPHONE HYDROCHLORIDE 2 MG/ML
0.5 INJECTION INTRAMUSCULAR; INTRAVENOUS; SUBCUTANEOUS
Qty: 0 | Refills: 0 | Status: DISCONTINUED | OUTPATIENT
Start: 2019-01-14 | End: 2019-01-15

## 2019-01-14 RX ORDER — ACETAMINOPHEN 500 MG
1000 TABLET ORAL ONCE
Qty: 0 | Refills: 0 | Status: COMPLETED | OUTPATIENT
Start: 2019-01-14 | End: 2019-01-14

## 2019-01-14 RX ORDER — AMPICILLIN SODIUM AND SULBACTAM SODIUM 250; 125 MG/ML; MG/ML
INJECTION, POWDER, FOR SUSPENSION INTRAMUSCULAR; INTRAVENOUS
Qty: 0 | Refills: 0 | Status: DISCONTINUED | OUTPATIENT
Start: 2019-01-14 | End: 2019-01-15

## 2019-01-14 RX ORDER — HEPARIN SODIUM 5000 [USP'U]/ML
5000 INJECTION INTRAVENOUS; SUBCUTANEOUS EVERY 8 HOURS
Qty: 0 | Refills: 0 | Status: DISCONTINUED | OUTPATIENT
Start: 2019-01-14 | End: 2019-01-14

## 2019-01-14 RX ORDER — AMPICILLIN SODIUM AND SULBACTAM SODIUM 250; 125 MG/ML; MG/ML
3 INJECTION, POWDER, FOR SUSPENSION INTRAMUSCULAR; INTRAVENOUS EVERY 6 HOURS
Qty: 0 | Refills: 0 | Status: DISCONTINUED | OUTPATIENT
Start: 2019-01-14 | End: 2019-01-15

## 2019-01-14 RX ORDER — OXYCODONE AND ACETAMINOPHEN 5; 325 MG/1; MG/1
1 TABLET ORAL EVERY 4 HOURS
Qty: 0 | Refills: 0 | Status: DISCONTINUED | OUTPATIENT
Start: 2019-01-14 | End: 2019-01-17

## 2019-01-14 RX ORDER — INSULIN LISPRO 100/ML
10 VIAL (ML) SUBCUTANEOUS ONCE
Qty: 0 | Refills: 0 | Status: DISCONTINUED | OUTPATIENT
Start: 2019-01-14 | End: 2019-01-14

## 2019-01-14 RX ORDER — TICAGRELOR 90 MG/1
1 TABLET ORAL
Qty: 0 | Refills: 0 | COMMUNITY

## 2019-01-14 RX ORDER — MAGNESIUM SULFATE 500 MG/ML
2 VIAL (ML) INJECTION ONCE
Qty: 0 | Refills: 0 | Status: COMPLETED | OUTPATIENT
Start: 2019-01-14 | End: 2019-01-14

## 2019-01-14 RX ORDER — POTASSIUM CHLORIDE 20 MEQ
20 PACKET (EA) ORAL ONCE
Qty: 0 | Refills: 0 | Status: COMPLETED | OUTPATIENT
Start: 2019-01-14 | End: 2019-01-14

## 2019-01-14 RX ADMIN — PIPERACILLIN AND TAZOBACTAM 200 GRAM(S): 4; .5 INJECTION, POWDER, LYOPHILIZED, FOR SOLUTION INTRAVENOUS at 05:31

## 2019-01-14 RX ADMIN — AMPICILLIN SODIUM AND SULBACTAM SODIUM 100 GRAM(S): 250; 125 INJECTION, POWDER, FOR SUSPENSION INTRAMUSCULAR; INTRAVENOUS at 09:11

## 2019-01-14 RX ADMIN — Medication 300 MILLIGRAM(S): at 00:19

## 2019-01-14 RX ADMIN — AMPICILLIN SODIUM AND SULBACTAM SODIUM 100 GRAM(S): 250; 125 INJECTION, POWDER, FOR SUSPENSION INTRAMUSCULAR; INTRAVENOUS at 23:36

## 2019-01-14 RX ADMIN — Medication 50 GRAM(S): at 15:14

## 2019-01-14 RX ADMIN — Medication 1000 MILLIGRAM(S): at 11:59

## 2019-01-14 RX ADMIN — SODIUM CHLORIDE 100 MILLILITER(S): 9 INJECTION, SOLUTION INTRAVENOUS at 19:07

## 2019-01-14 RX ADMIN — TACROLIMUS 3 MILLIGRAM(S): 5 CAPSULE ORAL at 00:19

## 2019-01-14 RX ADMIN — TACROLIMUS 3 MILLIGRAM(S): 5 CAPSULE ORAL at 05:30

## 2019-01-14 RX ADMIN — Medication 12 UNIT(S): at 08:29

## 2019-01-14 RX ADMIN — ATORVASTATIN CALCIUM 80 MILLIGRAM(S): 80 TABLET, FILM COATED ORAL at 00:19

## 2019-01-14 RX ADMIN — Medication 5: at 08:29

## 2019-01-14 RX ADMIN — AMPICILLIN SODIUM AND SULBACTAM SODIUM 100 GRAM(S): 250; 125 INJECTION, POWDER, FOR SUSPENSION INTRAMUSCULAR; INTRAVENOUS at 13:51

## 2019-01-14 RX ADMIN — Medication 5: at 12:06

## 2019-01-14 RX ADMIN — Medication 400 MILLIGRAM(S): at 10:43

## 2019-01-14 RX ADMIN — ATORVASTATIN CALCIUM 80 MILLIGRAM(S): 80 TABLET, FILM COATED ORAL at 22:59

## 2019-01-14 RX ADMIN — SODIUM CHLORIDE 75 MILLILITER(S): 9 INJECTION INTRAMUSCULAR; INTRAVENOUS; SUBCUTANEOUS at 00:19

## 2019-01-14 RX ADMIN — Medication 5 MILLIGRAM(S): at 05:31

## 2019-01-14 RX ADMIN — TICAGRELOR 90 MILLIGRAM(S): 90 TABLET ORAL at 19:12

## 2019-01-14 RX ADMIN — TICAGRELOR 90 MILLIGRAM(S): 90 TABLET ORAL at 00:18

## 2019-01-14 RX ADMIN — SODIUM CHLORIDE 100 MILLILITER(S): 9 INJECTION, SOLUTION INTRAVENOUS at 22:59

## 2019-01-14 RX ADMIN — AMPICILLIN SODIUM AND SULBACTAM SODIUM 100 GRAM(S): 250; 125 INJECTION, POWDER, FOR SUSPENSION INTRAMUSCULAR; INTRAVENOUS at 19:05

## 2019-01-14 RX ADMIN — INSULIN GLARGINE 30 UNIT(S): 100 INJECTION, SOLUTION SUBCUTANEOUS at 22:59

## 2019-01-14 RX ADMIN — Medication 12 UNIT(S): at 12:07

## 2019-01-14 RX ADMIN — OXYCODONE AND ACETAMINOPHEN 1 TABLET(S): 5; 325 TABLET ORAL at 03:00

## 2019-01-14 RX ADMIN — Medication 81 MILLIGRAM(S): at 22:59

## 2019-01-14 RX ADMIN — Medication 20 MILLIEQUIVALENT(S): at 09:07

## 2019-01-14 RX ADMIN — OXYCODONE AND ACETAMINOPHEN 1 TABLET(S): 5; 325 TABLET ORAL at 23:34

## 2019-01-14 RX ADMIN — TACROLIMUS 3 MILLIGRAM(S): 5 CAPSULE ORAL at 19:11

## 2019-01-14 RX ADMIN — OXYCODONE AND ACETAMINOPHEN 1 TABLET(S): 5; 325 TABLET ORAL at 02:29

## 2019-01-14 NOTE — PROGRESS NOTE ADULT - PROBLEM SELECTOR PLAN 1
presented with right shoulder erythema, swelling and pain started 2 days ago   s/p clinda in ED   blood and urine cultures were sent   will give one dose of vanco and zosyn - given worsening renal function in setting of renal transplant  ID Dr. Ovalles Presented with right shoulder erythema, swelling and pain started 2 days ago   - S/p clinda in ED   - S/p 1 dose of vanco and zosyn - given worsening renal function in setting of renal transplant  - C/w unasyn  - F/u blood cx  - ID Dr Ovalles Presented with right shoulder erythema, swelling and pain started 2 days ago   - S/p clinda in ED   - S/p 1 dose of vanco and zosyn - given worsening renal function in setting of renal transplant  - C/w unasyn  - F/u blood cx  - Going to OR for I and D  - ID Dr Ovalles

## 2019-01-14 NOTE — PROGRESS NOTE ADULT - PROBLEM SELECTOR PLAN 7
IMPROVE VTE Individual Risk Assessment          RISK                                                          Points  [  ] Previous VTE                                                3  [  ] Thrombophilia                                             2  [  ] Lower limb paralysis                                   2        (unable to hold up >15 seconds)    [  ] Current Cancer                                             2         (within 6 months)  [ x ] Immobilization > 24 hrs                              1  [  ] ICU/CCU stay > 24 hours                             1  [  ] Age > 60                                                         1    IMPROVE VTE Score: 1  no need for chemical anticoagulation On lantus 30 units, Humalog 12 units TID at home   - C/w home dose insulin and sliding scale   - F/u HbA1C

## 2019-01-14 NOTE — CONSULT NOTE ADULT - SUBJECTIVE AND OBJECTIVE BOX
This is a 38y Female with a PMHx of DM II (on insulin), CAD s/p LAD stent (2019 now on Brilinta) presented to [facility] yesterday with right upper back pain & erythema & was admitted for R back cellulitis with possible abscess. First appeared as a "pimple" on Friday night that progressively enlarged over the weekend and became more painful. The patient describes the pain as burning that is worse with palpation and laying on her back. Currently, pain is 8/10 in severity. Patient also reports fevers as high as 105.2 and chills on Saturday with two episodes of yellow emesis. Patient denies any recent travel or use of new detergents, lotions or insect bites. She had a R chest wall abscess in  after a wasp stung her which required an I&D with antibiotics.       PMHx: DM II, CAD, Renal Failure  SurgHx: LAD stent (2019), renal transplant (1999),  (2006)  Social Hx: former smoker  Allergies: No Known Allergies        Vitals: T(F): 98.9 (19 @ 08:08), Max: 99.4 (19 @ 15:33)  HR: 63 (19 @ 08:08) (63 - 114)  BP: 104/52 (19 @ 08:08) (90/49 - 105/64)  RR: 18 (19 @ 08:08) (17 - 20)  SpO2: 100% (19 @ 08:08) (97% - 100%)      Labs:                       10.2   23.6  )-----------( 294      ( 2019 06:07 )             31.5       135  |  102  |  19<H>  ----------------------------<  328<H>  3.7   |  21<L>  |  2.42<H>    Ca    7.8<L>      2019 06:07  Phos  2.8       Mg     1.2         TPro  8.1  /  Alb  2.8<L>  /  TBili  0.8  /  DBili  x   /  AST  4<L>  /  ALT  16  /  AlkPhos  134<H>          Physical Exam:  General: AAO x 3, No acute distress  Back: Right upper back with a very tender & indurated area approximately 6cm x 6cm. Area is erythematous & surrounded by several small white headed pustules. This area is tender to palpation with underlying fluctuance & a central punctate but no active drainage. No crepitus appreciated.    Extremities: decreased active ROM of RUE due to pain

## 2019-01-14 NOTE — PROGRESS NOTE ADULT - SUBJECTIVE AND OBJECTIVE BOX
38/F with PMH of renal transplant in 1999, DM, CAD s/p stent LAD Dec, 2018 (on aspirin and Brilinta- [place]) came after fever and cellulitis of right shoulder started 3 days ago. Patient mentions that she was having SOB for about last month for which she was following up with her PCP and was told that it is 2/2 anxiety. About 2-3 days ago she started having subjective fever and generalized weakness about day later her son noted small pimple on her back which got worse to diffuse cellulitis around right scapular region over next 12 hours. She noted that area was red, and warm. Also had fever of 102.5 with chills for which she took tylenol and rested but next day she continued to have episodes of fever which brought her to hospital. Denies nausea, vomiting, diarrhea, abdominal pain, chest pain, palpitations, dizziness, headache, cough, wheezing, joint pain or swelling.    Review of Systems:  Other Review of Systems: All other review of systems negative, except as noted in HPI	      pt seen in icu [  ], reg med floor [ x  ], bed [ x ], chair at bedside [   ], a+o x3 [ x ], lethargic [  ],  nad [ x ]          Allergies    No Known Allergies        Vitals    T(F): 98.6 (01-14-19 @ 06:06), Max: 99.4 (01-13-19 @ 15:33)  HR: 108 (01-14-19 @ 06:06) (92 - 114)  BP: 97/62 (01-14-19 @ 06:06) (90/49 - 105/64)  RR: 17 (01-14-19 @ 06:06) (17 - 20)  SpO2: 98% (01-14-19 @ 06:06) (97% - 100%)  Wt(kg): --  CAPILLARY BLOOD GLUCOSE      POCT Blood Glucose.: 248 mg/dL (13 Jan 2019 21:33)      Labs                          10.2   23.6  )-----------( 294      ( 14 Jan 2019 06:07 )             31.5       01-14    135  |  102  |  19<H>  ----------------------------<  328<H>  3.7   |  21<L>  |  x     Ca    7.8<L>      14 Jan 2019 06:07  Mg     1.2     01-14    TPro  8.1  /  Alb  2.8<L>  /  TBili  0.8  /  DBili  x   /  AST  4<L>  /  ALT  16  /  AlkPhos  134<H>  01-13      CARDIAC MARKERS ( 14 Jan 2019 00:17 )  <0.015 ng/mL / x     / 46 U/L / x     / <1.0 ng/mL  CARDIAC MARKERS ( 13 Jan 2019 15:45 )  <0.015 ng/mL / x     / x     / x     / x                Radiology Results    < from: Xray Chest 2 Views PA/Lat (01.13.19 @ 15:51) >  Findings: The heart is unremarkable. The lungs are clear. Bones are   unremarkable for age.    Impression: Clear lungs.      < end of copied text >        Meds    MEDICATIONS  (STANDING):  aspirin enteric coated 81 milliGRAM(s) Oral daily  atorvastatin 80 milliGRAM(s) Oral at bedtime  insulin glargine Injectable (LANTUS) 30 Unit(s) SubCutaneous at bedtime  insulin lispro (HumaLOG) corrective regimen sliding scale   SubCutaneous three times a day before meals  insulin lispro (HumaLOG) corrective regimen sliding scale   SubCutaneous at bedtime  insulin lispro Injectable (HumaLOG) 12 Unit(s) SubCutaneous three times a day before meals  metoprolol succinate ER 25 milliGRAM(s) Oral daily  NIFEdipine XL 60 milliGRAM(s) Oral daily  potassium chloride    Tablet ER 20 milliEquivalent(s) Oral once  predniSONE   Tablet 5 milliGRAM(s) Oral daily  sodium chloride 0.9%. 1000 milliLiter(s) (75 mL/Hr) IV Continuous <Continuous>  tacrolimus 3 milliGRAM(s) Oral every 12 hours  ticagrelor 90 milliGRAM(s) Oral two times a day      MEDICATIONS  (PRN):      Physical Exam    Neuro :  no focal deficits  Respiratory: CTA B/L  CV: RRR, S1S2, no murmurs,   Abdominal: Soft, NT, ND +BS,  Extremities: No edema, + peripheral pulses    ASSESSMENT    right shoulder Cellulitis  acute on ckd  sob  h/o CAD (coronary artery disease)  s/p stent LAD Dec, 2018 (on aspirin and Brilinta- [place])  Kidney transplant recipient  DM (diabetes mellitus)        PLAN      unasyn  id cons  iv fluids  renal cons  sob possible 2nd to anxiety  pulm cons  cxr with clear lungs noted above  cont current meds 38/F with PMH of renal transplant in 1999, DM, CAD s/p stent LAD Dec, 2018 (on aspirin and Brilinta- [place]) came after fever and cellulitis of right shoulder started 3 days ago. Patient mentions that she was having SOB for about last month for which she was following up with her PCP and was told that it is 2/2 anxiety. About 2-3 days ago she started having subjective fever and generalized weakness about day later her son noted small pimple on her back which got worse to diffuse cellulitis around right scapular region over next 12 hours. She noted that area was red, and warm. Also had fever of 102.5 with chills for which she took tylenol and rested but next day she continued to have episodes of fever which brought her to hospital. Denies nausea, vomiting, diarrhea, abdominal pain, chest pain, palpitations, dizziness, headache, cough, wheezing, joint pain or swelling.    Review of Systems:  Other Review of Systems: All other review of systems negative, except as noted in HPI	      pt seen in icu [  ], reg med floor [ x  ], bed [ x ], chair at bedside [   ], a+o x3 [ x ], lethargic [  ],  nad [ x ]          Allergies    No Known Allergies        Vitals    T(F): 98.6 (01-14-19 @ 06:06), Max: 99.4 (01-13-19 @ 15:33)  HR: 108 (01-14-19 @ 06:06) (92 - 114)  BP: 97/62 (01-14-19 @ 06:06) (90/49 - 105/64)  RR: 17 (01-14-19 @ 06:06) (17 - 20)  SpO2: 98% (01-14-19 @ 06:06) (97% - 100%)  Wt(kg): --  CAPILLARY BLOOD GLUCOSE      POCT Blood Glucose.: 248 mg/dL (13 Jan 2019 21:33)      Labs                          10.2   23.6  )-----------( 294      ( 14 Jan 2019 06:07 )             31.5       01-14    135  |  102  |  19<H>  ----------------------------<  328<H>  3.7   |  21<L>  |  x     Ca    7.8<L>      14 Jan 2019 06:07  Mg     1.2     01-14    TPro  8.1  /  Alb  2.8<L>  /  TBili  0.8  /  DBili  x   /  AST  4<L>  /  ALT  16  /  AlkPhos  134<H>  01-13      CARDIAC MARKERS ( 14 Jan 2019 00:17 )  <0.015 ng/mL / x     / 46 U/L / x     / <1.0 ng/mL  CARDIAC MARKERS ( 13 Jan 2019 15:45 )  <0.015 ng/mL / x     / x     / x     / x                Radiology Results    < from: Xray Chest 2 Views PA/Lat (01.13.19 @ 15:51) >  Findings: The heart is unremarkable. The lungs are clear. Bones are   unremarkable for age.    Impression: Clear lungs.      < end of copied text >        Meds    MEDICATIONS  (STANDING):  aspirin enteric coated 81 milliGRAM(s) Oral daily  atorvastatin 80 milliGRAM(s) Oral at bedtime  insulin glargine Injectable (LANTUS) 30 Unit(s) SubCutaneous at bedtime  insulin lispro (HumaLOG) corrective regimen sliding scale   SubCutaneous three times a day before meals  insulin lispro (HumaLOG) corrective regimen sliding scale   SubCutaneous at bedtime  insulin lispro Injectable (HumaLOG) 12 Unit(s) SubCutaneous three times a day before meals  metoprolol succinate ER 25 milliGRAM(s) Oral daily  NIFEdipine XL 60 milliGRAM(s) Oral daily  potassium chloride    Tablet ER 20 milliEquivalent(s) Oral once  predniSONE   Tablet 5 milliGRAM(s) Oral daily  sodium chloride 0.9%. 1000 milliLiter(s) (75 mL/Hr) IV Continuous <Continuous>  tacrolimus 3 milliGRAM(s) Oral every 12 hours  ticagrelor 90 milliGRAM(s) Oral two times a day      MEDICATIONS  (PRN):      Physical Exam    Neuro :  no focal deficits  Respiratory: CTA B/L  CV: RRR, S1S2, no murmurs,   Abdominal: Soft, NT, ND +BS,  Extremities: No edema, + peripheral pulses, right suprascapular erythema and tenderness    ASSESSMENT    right shoulder Cellulitis r/o abcess  uti  acute on ckd  sob  uncontrolled dm  h/o CAD (coronary artery disease)  s/p stent LAD Dec, 2018 (on aspirin and Brilinta- [place])  Kidney transplant recipient  DM (diabetes mellitus)        PLAN      unasyn  id cons  f/u blood and ucx  surgery cons  iv fluids  renal cons noted  sob possible 2nd to anxiety  pulm cons  cxr with clear lungs noted above  endo cons  cont lantus and humalog  hss  cont current meds

## 2019-01-14 NOTE — BRIEF OPERATIVE NOTE - PROCEDURE
<<-----Click on this checkbox to enter Procedure Incision and drainage abscess  01/14/2019  Right shoulder  Active  MOZGA

## 2019-01-14 NOTE — PROGRESS NOTE ADULT - PROBLEM SELECTOR PLAN 2
SOB for about last month for which she was following up with her PCP and was told that it is 2/2 anxiety.   most likely form pain at this time  Ddimer 516  T1 and T2 negative with recent cath - on aspirin and brilinta Asymptomatic, positive UA  - F/u urine cx  - ID Dr Ovalles

## 2019-01-14 NOTE — CONSULT NOTE ADULT - PROBLEM SELECTOR RECOMMENDATION 2
1- Blood sugar monitoring and control.  2- Accu-Cheks with coverage.  3- 1800 jack ADA diet.  4- Follow HB A1C.

## 2019-01-14 NOTE — PROGRESS NOTE ADULT - SUBJECTIVE AND OBJECTIVE BOX
PGY1 Note discussed with Supervising Resident and Primary Attending.    Patient is a 38y old  Female who presents with a chief complaint of fever and right shoulder cellulitis (2019 06:57)      INTERVAL HPI/OVERNIGHT EVENTS :  No acute overnight events. Patient seen and examined at bedside. Patient has no current complaints. Patient denies nausea, vomiting, diarrhea, chest pain, SOB, headache.  MEDICATIONS  (STANDING):  acetaminophen  IVPB .. 1000 milliGRAM(s) IV Intermittent once  ampicillin/sulbactam  IVPB      ampicillin/sulbactam  IVPB 3 Gram(s) IV Intermittent every 6 hours  aspirin enteric coated 81 milliGRAM(s) Oral daily  atorvastatin 80 milliGRAM(s) Oral at bedtime  heparin  Injectable 5000 Unit(s) SubCutaneous every 8 hours  insulin glargine Injectable (LANTUS) 30 Unit(s) SubCutaneous at bedtime  insulin lispro (HumaLOG) corrective regimen sliding scale   SubCutaneous three times a day before meals  insulin lispro (HumaLOG) corrective regimen sliding scale   SubCutaneous at bedtime  insulin lispro Injectable (HumaLOG) 12 Unit(s) SubCutaneous three times a day before meals  magnesium sulfate  IVPB 2 Gram(s) IV Intermittent once  metoprolol succinate ER 25 milliGRAM(s) Oral daily  NIFEdipine XL 60 milliGRAM(s) Oral daily  predniSONE   Tablet 5 milliGRAM(s) Oral daily  sodium chloride 0.9%. 1000 milliLiter(s) (75 mL/Hr) IV Continuous <Continuous>  tacrolimus 3 milliGRAM(s) Oral every 12 hours  ticagrelor 90 milliGRAM(s) Oral two times a day    MEDICATIONS  (PRN):      Allergies    No Known Allergies    Intolerances        REVIEW OF SYSTEMS :  * General: denies chills, fatigue  * Eyes: denies vision changes  * Respiratory: denies cough, SOB, wheezing  * Cardio: denies chest pain, palpitations  * GI: denies abd pain, nausea, vomiting, diarrhea  * : denies dysuria  * Neuro: denies headaches, numbness, weakness  * MSK: denies joint pain  * Skin: denies itching, rashes    Vital Signs Last 24 Hrs  T(C): 37.2 (2019 08:08), Max: 37.4 (2019 15:33)  T(F): 98.9 (2019 08:08), Max: 99.4 (2019 15:33)  HR: 63 (2019 08:08) (63 - 114)  BP: 104/52 (2019 08:08) (90/49 - 105/64)  BP(mean): --  RR: 18 (2019 08:08) (17 - 20)  SpO2: 100% (2019 08:08) (97% - 100%)    PHYSICAL EXAM :  * General: no acute distress, well-nourished, well-developed  * HEENT: atraumatic, normocephalic; moist mucus membranes; supple neck; no JVD  * CN: EOMI, PERRL  * Respiratory: cta b/l; no wheezes, rales, rhonchi  * Cardio: RRR; no appreciable murmurs, rubs, gallops  * Abd: soft, nontender, nondistended, +BS  * Neuro: AAOx3; strength 5/5; sensation intact throughout  * Extremities: no clubbing, cyanosis, edema  * Skin: no rashes    LABS:                          10.2   23.6  )-----------( 294      ( 2019 06:07 )             31.5     -    135  |  102  |  19<H>  ----------------------------<  328<H>  3.7   |  21<L>  |  2.42<H>    Ca    7.8<L>      2019 06:07  Phos  2.8       Mg     1.2         TPro  8.1  /  Alb  2.8<L>  /  TBili  0.8  /  DBili  x   /  AST  4<L>  /  ALT  16  /  AlkPhos  134<H>  -13    PT/INR - ( 2019 15:45 )   PT: 13.4 sec;   INR: 1.20 ratio         PTT - ( 2019 15:45 )  PTT:28.1 sec  Urinalysis Basic - ( 2019 19:47 )    Color: Yellow / Appearance: Slightly Turbid / S.015 / pH: x  Gluc: x / Ketone: Negative  / Bili: Negative / Urobili: Negative   Blood: x / Protein: 30 mg/dL / Nitrite: Negative   Leuk Esterase: Moderate / RBC: 10-25 /HPF / WBC >50 /HPF   Sq Epi: x / Non Sq Epi: Moderate /HPF / Bacteria: Many /HPF      CAPILLARY BLOOD GLUCOSE      POCT Blood Glucose.: 376 mg/dL (2019 08:18)  POCT Blood Glucose.: 248 mg/dL (2019 21:33)  POCT Blood Glucose.: 270 mg/dL (2019 20:25)      RADIOLOGY & ADDITIONAL TESTS:   no new imaging    Imaging Personally Reviewed:    Consultant(s) Notes Reviewed: PGY1 Note discussed with Supervising Resident and Primary Attending.    Patient is a 38y old  Female who presents with a chief complaint of fever and right shoulder cellulitis (2019 06:57)      INTERVAL HPI/OVERNIGHT EVENTS:  No acute overnight events. Patient seen and examined at bedside. She reports that she is feeling better today but is still having a headache and some pain over her right shoulder/back but denies NVDC, abd pain, chills, SOB, chest pain.     MEDICATIONS  (STANDING):  acetaminophen  IVPB .. 1000 milliGRAM(s) IV Intermittent once  ampicillin/sulbactam  IVPB      ampicillin/sulbactam  IVPB 3 Gram(s) IV Intermittent every 6 hours  aspirin enteric coated 81 milliGRAM(s) Oral daily  atorvastatin 80 milliGRAM(s) Oral at bedtime  heparin  Injectable 5000 Unit(s) SubCutaneous every 8 hours  insulin glargine Injectable (LANTUS) 30 Unit(s) SubCutaneous at bedtime  insulin lispro (HumaLOG) corrective regimen sliding scale   SubCutaneous three times a day before meals  insulin lispro (HumaLOG) corrective regimen sliding scale   SubCutaneous at bedtime  insulin lispro Injectable (HumaLOG) 12 Unit(s) SubCutaneous three times a day before meals  magnesium sulfate  IVPB 2 Gram(s) IV Intermittent once  metoprolol succinate ER 25 milliGRAM(s) Oral daily  NIFEdipine XL 60 milliGRAM(s) Oral daily  predniSONE   Tablet 5 milliGRAM(s) Oral daily  sodium chloride 0.9%. 1000 milliLiter(s) (75 mL/Hr) IV Continuous <Continuous>  tacrolimus 3 milliGRAM(s) Oral every 12 hours  ticagrelor 90 milliGRAM(s) Oral two times a day    MEDICATIONS  (PRN):      Allergies    No Known Allergies    Intolerances        REVIEW OF SYSTEMS:  * General: denies chills, fatigue  * Eyes: denies vision changes  * Respiratory: denies cough, SOB, wheezing  * Cardio: denies chest pain, palpitations  * GI: denies abd pain, nausea, vomiting, diarrhea  * : denies dysuria  * Neuro: reports headaches; denies numbness, weakness  * MSK: denies joint pain  * Skin: denies itching, rashes; reports pain over shoulder/back    Vital Signs Last 24 Hrs  T(C): 37.2 (2019 08:08), Max: 37.4 (2019 15:33)  T(F): 98.9 (2019 08:08), Max: 99.4 (2019 15:33)  HR: 63 (2019 08:08) (63 - 114)  BP: 104/52 (2019 08:08) (90/49 - 105/64)  BP(mean): --  RR: 18 (2019 08:08) (17 - 20)  SpO2: 100% (2019 08:08) (97% - 100%)    PHYSICAL EXAM:  * General: no acute distress, well-nourished, well-developed  * HEENT: atraumatic, normocephalic; moist mucus membranes; supple neck; no JVD  * CN: EOMI, PERRL  * Respiratory: cta b/l; no wheezes, rales, rhonchi  * Cardio: RRR; no appreciable murmurs, rubs, gallops  * Abd: soft, nontender, nondistended, +BS  * Neuro: AAOx3; strength 5/5; sensation intact throughout  * Extremities: no clubbing, cyanosis, edema  * Skin: no rashes; erythema, warmth, and pustule over right scapula without fluctuance    LABS:                          10.2   23.6  )-----------( 294      ( 2019 06:07 )             31.5         135  |  102  |  19<H>  ----------------------------<  328<H>  3.7   |  21<L>  |  2.42<H>    Ca    7.8<L>      2019 06:07  Phos  2.8       Mg     1.2         TPro  8.1  /  Alb  2.8<L>  /  TBili  0.8  /  DBili  x   /  AST  4<L>  /  ALT  16  /  AlkPhos  134<H>      PT/INR - ( 2019 15:45 )   PT: 13.4 sec;   INR: 1.20 ratio         PTT - ( 2019 15:45 )  PTT:28.1 sec  Urinalysis Basic - ( 2019 19:47 )    Color: Yellow / Appearance: Slightly Turbid / S.015 / pH: x  Gluc: x / Ketone: Negative  / Bili: Negative / Urobili: Negative   Blood: x / Protein: 30 mg/dL / Nitrite: Negative   Leuk Esterase: Moderate / RBC: 10-25 /HPF / WBC >50 /HPF   Sq Epi: x / Non Sq Epi: Moderate /HPF / Bacteria: Many /HPF      CAPILLARY BLOOD GLUCOSE      POCT Blood Glucose.: 376 mg/dL (2019 08:18)  POCT Blood Glucose.: 248 mg/dL (2019 21:33)  POCT Blood Glucose.: 270 mg/dL (2019 20:25)      RADIOLOGY & ADDITIONAL TESTS:   no new imaging    Consultant(s) Notes Reviewed: no new consults

## 2019-01-14 NOTE — PROGRESS NOTE ADULT - SUBJECTIVE AND OBJECTIVE BOX
POSTOP NOTE    Patient is doing okay.      Vital Signs Last 24 Hrs  T(C): 36.7 (2019 19:04), Max: 37.2 (2019 08:08)  T(F): 98.1 (2019 19:04), Max: 98.9 (2019 08:08)  HR: 89 (2019 19:04) (63 - 114)  BP: 101/70 (2019 19:04) (93/49 - 124/77)  BP(mean): 78 (2019 18:49) (75 - 79)  RR: 14 (2019 19:04) (13 - 28)  SpO2: 100% (2019 19:04) (98% - 100%)    Daily Height in cm: 167.64 (2019 06:06)    Daily Weight in k.5 (2019 06:06)    I&O's Detail    2019 07:01  -  2019 23:04  --------------------------------------------------------  IN:    lactated ringers.: 100 mL    sodium chloride 0.9%: 600 mL  Total IN: 700 mL    OUT:  Total OUT: 0 mL    Total NET: 700 mL          MEDICATIONS  (STANDING):  ampicillin/sulbactam  IVPB      ampicillin/sulbactam  IVPB 3 Gram(s) IV Intermittent every 6 hours  aspirin enteric coated 81 milliGRAM(s) Oral daily  atorvastatin 80 milliGRAM(s) Oral at bedtime  insulin glargine Injectable (LANTUS) 30 Unit(s) SubCutaneous at bedtime  insulin lispro (HumaLOG) corrective regimen sliding scale   SubCutaneous three times a day before meals  insulin lispro (HumaLOG) corrective regimen sliding scale   SubCutaneous at bedtime  insulin lispro Injectable (HumaLOG) 12 Unit(s) SubCutaneous three times a day before meals  lactated ringers. 1000 milliLiter(s) (100 mL/Hr) IV Continuous <Continuous>  metoprolol succinate ER 25 milliGRAM(s) Oral daily  NIFEdipine XL 60 milliGRAM(s) Oral daily  predniSONE   Tablet 5 milliGRAM(s) Oral daily  tacrolimus 3 milliGRAM(s) Oral every 12 hours  ticagrelor 90 milliGRAM(s) Oral two times a day    MEDICATIONS  (PRN):  HYDROmorphone  Injectable 0.5 milliGRAM(s) IV Push every 10 minutes PRN Moderate Pain (4 - 6)  ondansetron Injectable 4 milliGRAM(s) IV Push once PRN Nausea and/or Vomiting  oxyCODONE    5 mG/acetaminophen 325 mG 1 Tablet(s) Oral every 4 hours PRN Moderate Pain (4 - 6)      PHYSICAL EXAM:-  CONSTITIONAL/GENERAL: In no acute distress  RESPIRATORY/CHEST: Clear to percussion bilaterally; Normal respiratory effort  CARDIOVASCULAR/HEART: Regular rate and rhythm  GASTROINTESTINAL/ABDOMEN: Soft, Nontender, Nondistended; Bowel sounds present  MUSCULOSKELETAL/EXTREMITIES:  No clubbing, cyanosis, or edema    LABS:-                        10.2   23.6  )-----------( 294      ( 2019 06:07 )             31.5     Neutrophil %:       135  |  102  |  19<H>  ----------------------------<  328<H>  3.7   |  21<L>  |  2.42<H>    Ca    7.8<L>      2019 06:07  Phos  2.8       Mg     1.2         TPro  8.1  /  Alb  2.8<L>  /  TBili  0.8  /  DBili  x   /  AST  4<L>  /  ALT  16  /  AlkPhos  134<H>      PT/INR - ( 2019 15:45 )   PT: 13.4 sec;   INR: 1.20 ratio         PTT - ( 2019 15:45 )  PTT:28.1 sec  Urinalysis Basic - ( 2019 19:47 )    Color: Yellow / Appearance: Slightly Turbid / S.015 / pH: x  Gluc: x / Ketone: Negative  / Bili: Negative / Urobili: Negative   Blood: x / Protein: 30 mg/dL / Nitrite: Negative   Leuk Esterase: Moderate / RBC: 10-25 /HPF / WBC >50 /HPF   Sq Epi: x / Non Sq Epi: Moderate /HPF / Bacteria: Many /HPF          Lactate, Blood: 1.9 mmol/L ( @ 15:45)    A/P-  S/p I&D Rt Back Abscess  Doing well  Analgesics prn  Abx IV  Postop care

## 2019-01-14 NOTE — CONSULT NOTE ADULT - PROBLEM SELECTOR RECOMMENDATION 9
1- Follow +VE UA & CS.  2- Follow Blood culture to final report.  3- S/P Vancomycin x 1 dose.  4- Continue IV Unasyn, but decrease dose to 1.5 gm IVPB q 6 hours for renal adjustment.  5- Fluid and electrolytes management.  6- CBC and BMP follow up.   7- Surgical management, and follow up.

## 2019-01-14 NOTE — PROGRESS NOTE ADULT - PROBLEM SELECTOR PLAN 4
BUN/Cr 19/2.33 (baseline 14/1.18)  pre-renal vs worsening renal function given transplant status   will give fluid challenge s/p LAD stent in dec, 2018   - C/w brilinta and aspirin  - Done at [place] - Dr. Joel Herndon

## 2019-01-14 NOTE — PROGRESS NOTE ADULT - SUBJECTIVE AND OBJECTIVE BOX
Patient seen and examined; right back upper carbuncle with pus, WBC 24 on immunosuppression for renal transplant. Needs incision and drainage. Risks, benefits alternative discussed, all questions answered; agrees to proceed.

## 2019-01-14 NOTE — PROGRESS NOTE ADULT - PROBLEM SELECTOR PLAN 5
in 1999   on tacrolimus and prednisone 5 mg   continue home medications BUN/Cr 19/2.33 (baseline 14/1.18)  - Pre-renal vs worsening renal function given transplant status BUN/Cr 19/2.33 (baseline 14/1.18)  - Pre-renal vs worsening renal function given transplant status  - Called [facility] renal transplant center [phone], spoke with coordinator who is having the patient's nurse call me back. As per coordinator, the patient no longer follows at [place].

## 2019-01-14 NOTE — CONSULT NOTE ADULT - SUBJECTIVE AND OBJECTIVE BOX
Chief complain and HPI    38/F with PMH of living donor  renal transplant in , DM, CAD s/p stent LAD Dec, 2018 (on aspirin and Brilinta- [place]) came after fever and cellulitis of right shoulder started 3 days ago. Patient mentions that she was having SOB for about last month for which she was following up with her PCP and was told that it is 2/2 anxiety. About 2-3 days ago she started having subjective fever and generalized weakness about day later her son noted small pimple on her back which got worse to diffuse cellulitis around right scapular region over next 12 hours. She noted that area was red, and warm. Also had fever of 102.5 with chills for which she took tylenol and rested but next day she continued to have episodes of fever which brought her to hospital. Denies nausea, vomiting, diarrhea, abdominal pain, chest pain, palpitations, dizziness, headache, cough, wheezing, joint pain or swelling.  History of renal transplant for 20 years follow at [facility]   Base line creatinine is 1.4 , whic was the level, last month.  Renal consult was called for MICHAEL , creatinine 2.42    PAST MEDICAL & SURGICAL HISTORY:  CAD (coronary artery disease)  Kidney transplant recipient  DM (diabetes mellitus)      Home Medications Reviewed    Hospital Medications:   MEDICATIONS  (STANDING):  ampicillin/sulbactam  IVPB      ampicillin/sulbactam  IVPB 3 Gram(s) IV Intermittent every 6 hours  aspirin enteric coated 81 milliGRAM(s) Oral daily  atorvastatin 80 milliGRAM(s) Oral at bedtime  insulin glargine Injectable (LANTUS) 30 Unit(s) SubCutaneous at bedtime  insulin lispro (HumaLOG) corrective regimen sliding scale   SubCutaneous three times a day before meals  insulin lispro (HumaLOG) corrective regimen sliding scale   SubCutaneous at bedtime  insulin lispro Injectable (HumaLOG) 12 Unit(s) SubCutaneous three times a day before meals  magnesium sulfate  IVPB 2 Gram(s) IV Intermittent once  metoprolol succinate ER 25 milliGRAM(s) Oral daily  NIFEdipine XL 60 milliGRAM(s) Oral daily  predniSONE   Tablet 5 milliGRAM(s) Oral daily  sodium chloride 0.9%. 1000 milliLiter(s) (75 mL/Hr) IV Continuous <Continuous>  tacrolimus 3 milliGRAM(s) Oral every 12 hours  ticagrelor 90 milliGRAM(s) Oral two times a day    MEDICATIONS  (PRN):      Allergies    No Known Allergies    Intolerances                        10.2   23.6  )-----------( 294      ( 2019 06:07 )             31.5         135  |  102  |  19<H>  ----------------------------<  328<H>  3.7   |  21<L>  |  2.42<H>    Ca    7.8<L>      2019 06:07  Phos  2.8       Mg     1.2         TPro  8.1  /  Alb  2.8<L>  /  TBili  0.8  /  DBili  x   /  AST  4<L>  /  ALT  16  /  AlkPhos  134<H>      PT/INR - ( 2019 15:45 )   PT: 13.4 sec;   INR: 1.20 ratio         PTT - ( 2019 15:45 )  PTT:28.1 sec  Urinalysis Basic - ( 2019 19:47 )    Color: Yellow / Appearance: Slightly Turbid / S.015 / pH: x  Gluc: x / Ketone: Negative  / Bili: Negative / Urobili: Negative   Blood: x / Protein: 30 mg/dL / Nitrite: Negative   Leuk Esterase: Moderate / RBC: 10-25 /HPF / WBC >50 /HPF   Sq Epi: x / Non Sq Epi: Moderate /HPF / Bacteria: Many /HPF            RADIOLOGY & ADDITIONAL STUDIES:    INTERPRETATION:  Clinical Information: Dizziness    Technique: AP chest image.     Comparison: 2018    Findings: The heart is unremarkable. The lungs are clear. Bones are   unremarkable for age.    Impression: Clear lungs.      SOCIAL HISTORY: Denies ETOh,Smoking,     FAMILY HISTORY:  No pertinent family history in first degree relatives      REVIEW OF SYSTEMS:  CONSTITUTIONAL: c/o  fatigue, weakness, tired.  EYES/ENT: No visual changes;  No vertigo or throat pain   Pain in right upper back area  RESPIRATORY: No cough, wheezing, hemoptysis; No shortness of breath  CARDIOVASCULAR: No chest pain or palpitations. No edema  GASTROINTESTINAL: No abdominal or epigastric pain. No nausea, vomiting, or hematemesis; No diarrhea or constipation. No melena or hematochezia.  GENITOURINARY: No dysuria, frequency.    VITALS:  Vital Signs Last 24 Hrs  T(C): 37.2 (2019 08:08), Max: 37.4 (2019 15:33)  T(F): 98.9 (2019 08:08), Max: 99.4 (2019 15:33)  HR: 63 (2019 08:08) (63 - 114)  BP: 104/52 (2019 08:08) (90/49 - 105/64)  BP(mean): --  RR: 18 (2019 08:08) (17 - 20)  SpO2: 100% (2019 08:08) (97% - 100%)    Height (cm): 167.64 ( @ 06:06)  Weight (kg): 108.9 ( @ 14:14)  BMI (kg/m2): 38.8 ( @ 06:06)  BSA (m2): 2.16 ( @ 06:06)    PHYSICAL EXAM:  Constitutional: NAD  HEENT: anicteric sclera, oropharynx clear, MMM  Neck: No JVD  Respiratory: good air entrance B/L, no wheezes, rales or rhonchi  Cardiovascular: S1, S2, RRR, no pericardial rub, no murmur  Gastrointestinal: BS+, soft, no tenderness, no distension, no bruit  Pelvis: bladder non-distended, no CVA tenderness  Extremities: No cyanosis or clubbing. No peripheral edema  Neurological: A/O x 3, no focal deficits  Psychiatric: Normal mood, normal affect  : No CVA tenderness. No worley.   Skin: No rashes  Vascular: all pulses present  Access: Chief complain and HPI    38/F with PMH of living donor  renal transplant in , DM, CAD s/p stent LAD Dec, 2018 (on aspirin and Brilinta- [employer]) came after fever and cellulitis of right shoulder started 3 days ago. Patient mentions that she was having SOB for about last month for which she was following up with her PCP and was told that it is 2/2 anxiety. About 2-3 days ago she started having subjective fever and generalized weakness about day later her son noted small pimple on her back which got worse to diffuse cellulitis around right scapular region over next 12 hours. She noted that area was red, and warm. Also had fever of 102.5 with chills for which she took tylenol and rested but next day she continued to have episodes of fever which brought her to hospital. Denies nausea, vomiting, diarrhea, abdominal pain, chest pain, palpitations, dizziness, headache, cough, wheezing, joint pain or swelling.  History of renal transplant for 20 years follow at [facility]   Base line creatinine is 1.4 , which was the level, last month on her admission for CAD at [place]  She was last seen in their center in .  Renal consult was called for MICHAEL , creatinine 2.42    Renal failure at age 18 - cause unknown  Diabetes since 2017.    PAST MEDICAL & SURGICAL HISTORY:  CAD (coronary artery disease)  Kidney transplant recipient  DM (diabetes mellitus)      Home Medications Reviewed    Hospital Medications:   MEDICATIONS  (STANDING):  ampicillin/sulbactam  IVPB      ampicillin/sulbactam  IVPB 3 Gram(s) IV Intermittent every 6 hours  aspirin enteric coated 81 milliGRAM(s) Oral daily  atorvastatin 80 milliGRAM(s) Oral at bedtime  insulin glargine Injectable (LANTUS) 30 Unit(s) SubCutaneous at bedtime  insulin lispro (HumaLOG) corrective regimen sliding scale   SubCutaneous three times a day before meals  insulin lispro (HumaLOG) corrective regimen sliding scale   SubCutaneous at bedtime  insulin lispro Injectable (HumaLOG) 12 Unit(s) SubCutaneous three times a day before meals  magnesium sulfate  IVPB 2 Gram(s) IV Intermittent once  metoprolol succinate ER 25 milliGRAM(s) Oral daily  NIFEdipine XL 60 milliGRAM(s) Oral daily  predniSONE   Tablet 5 milliGRAM(s) Oral daily  sodium chloride 0.9%. 1000 milliLiter(s) (75 mL/Hr) IV Continuous <Continuous>  tacrolimus 3 milliGRAM(s) Oral every 12 hours  ticagrelor 90 milliGRAM(s) Oral two times a day    MEDICATIONS  (PRN):      Allergies    No Known Allergies    Intolerances                        10.2   23.6  )-----------( 294      ( 2019 06:07 )             31.5         135  |  102  |  19<H>  ----------------------------<  328<H>  3.7   |  21<L>  |  2.42<H>    Ca    7.8<L>      2019 06:07  Phos  2.8       Mg     1.2         TPro  8.1  /  Alb  2.8<L>  /  TBili  0.8  /  DBili  x   /  AST  4<L>  /  ALT  16  /  AlkPhos  134<H>      PT/INR - ( 2019 15:45 )   PT: 13.4 sec;   INR: 1.20 ratio         PTT - ( 2019 15:45 )  PTT:28.1 sec  Urinalysis Basic - ( 2019 19:47 )    Color: Yellow / Appearance: Slightly Turbid / S.015 / pH: x  Gluc: x / Ketone: Negative  / Bili: Negative / Urobili: Negative   Blood: x / Protein: 30 mg/dL / Nitrite: Negative   Leuk Esterase: Moderate / RBC: 10-25 /HPF / WBC >50 /HPF   Sq Epi: x / Non Sq Epi: Moderate /HPF / Bacteria: Many /HPF            RADIOLOGY & ADDITIONAL STUDIES:    INTERPRETATION:  Clinical Information: Dizziness    Technique: AP chest image.     Comparison: 2018    Findings: The heart is unremarkable. The lungs are clear. Bones are   unremarkable for age.    Impression: Clear lungs.      SOCIAL HISTORY: Denies ETOh,Smoking,     FAMILY HISTORY:  No pertinent family history in first degree relatives      REVIEW OF SYSTEMS:  CONSTITUTIONAL: c/o  fatigue, weakness, tired.  EYES/ENT: No visual changes;  No vertigo or throat pain   Pain in right upper back area  RESPIRATORY: No cough, wheezing, hemoptysis; No shortness of breath  CARDIOVASCULAR: No chest pain or palpitations. No edema  GASTROINTESTINAL: No abdominal or epigastric pain. No nausea, vomiting, or hematemesis; No diarrhea or constipation. No melena or hematochezia.  GENITOURINARY: No dysuria, frequency.No pain in pelvic area    VITALS:  Vital Signs Last 24 Hrs  T(C): 37.2 (2019 08:08), Max: 37.4 (2019 15:33)  T(F): 98.9 (2019 08:08), Max: 99.4 (2019 15:33)  HR: 63 (2019 08:08) (63 - 114)  BP: 104/52 (2019 08:08) (90/49 - 105/64)  BP(mean): --  RR: 18 (2019 08:08) (17 - 20)  SpO2: 100% (2019 08:08) (97% - 100%)    Height (cm): 167.64 ( @ 06:06)  Weight (kg): 108.9 ( @ 14:14)  BMI (kg/m2): 38.8 ( @ 06:06)  BSA (m2): 2.16 ( @ 06:06)    PHYSICAL EXAM:  Constitutional: NAD  HEENT: anicteric sclera, oropharynx clear, MMM  Neck: No JVD  Respiratory: good air entrance B/L, no wheezes, rales or rhonchi  Cardiovascular: S1, S2, RRR, no pericardial rub, no murmur  Gastrointestinal: BS+, soft, no tenderness, no distension, no bruit  Pelvis: bladder non-distended, no CVA tenderness, no pain in right lower quadrant to palpation  Obese .  Extremities: No cyanosis or clubbing. No peripheral edema  Right upper back area, indurated red area diameter of 5 cm.  Tender , hard no flctuance

## 2019-01-14 NOTE — CONSULT NOTE ADULT - SUBJECTIVE AND OBJECTIVE BOX
HPI:  38/F with PMH of renal transplant in , DM, CAD s/p stent LAD Dec, 2018 (on aspirin and Brilinta- [place]) came after fever and cellulitis of right shoulder started 3 days ago. Patient mentions that she was having SOB for about last month for which she was following up with her PCP and was told that it is 2/2 anxiety. About 2-3 days ago she started having subjective fever and generalized weakness about day later her son noted small pimple on her back which got worse to diffuse cellulitis around right scapular region over next 12 hours. She noted that area was red, and warm. Also had fever of 102.5 with chills for which she took tylenol and rested but next day she continued to have episodes of fever which brought her to hospital. Denies nausea, vomiting, diarrhea, abdominal pain, chest pain, palpitations, dizziness, headache, cough, wheezing, joint pain or swelling. (2019 22:55)      PAST MEDICAL & SURGICAL HISTORY:  CAD (coronary artery disease)  Kidney transplant recipient  DM (diabetes mellitus)  No significant past surgical history      No Known Allergies      ampicillin/sulbactam  IVPB      ampicillin/sulbactam  IVPB 3 Gram(s) IV Intermittent every 6 hours  aspirin enteric coated 81 milliGRAM(s) Oral daily  atorvastatin 80 milliGRAM(s) Oral at bedtime  HYDROmorphone  Injectable 0.5 milliGRAM(s) IV Push every 10 minutes PRN  insulin glargine Injectable (LANTUS) 30 Unit(s) SubCutaneous at bedtime  insulin lispro (HumaLOG) corrective regimen sliding scale   SubCutaneous three times a day before meals  insulin lispro (HumaLOG) corrective regimen sliding scale   SubCutaneous at bedtime  insulin lispro Injectable (HumaLOG) 12 Unit(s) SubCutaneous three times a day before meals  lactated ringers. 1000 milliLiter(s) IV Continuous <Continuous>  metoprolol succinate ER 25 milliGRAM(s) Oral daily  NIFEdipine XL 60 milliGRAM(s) Oral daily  ondansetron Injectable 4 milliGRAM(s) IV Push once PRN  oxyCODONE    5 mG/acetaminophen 325 mG 1 Tablet(s) Oral every 4 hours PRN  predniSONE   Tablet 5 milliGRAM(s) Oral daily  sodium chloride 0.9%. 1000 milliLiter(s) IV Continuous <Continuous>  tacrolimus 3 milliGRAM(s) Oral every 12 hours  ticagrelor 90 milliGRAM(s) Oral two times a day      Social Hx:    FAMILY HISTORY:  No pertinent family history in first degree relatives        ROS  [  ] UNABLE TO ELICIT    General:  [  ] None  [ x ] Fever  [ x ] Chills  [ x ] Malaise    Skin:  [  ] None [  ] Rash  [  ] Wound  [ x ] back abscess.    HEENT:  [ x ] None  [  ] Sore Throat  [  ] Nasal congestion/ runny nose  [  ] Photophobia [  ] Neck pain      Chest:  [ x ] None   [  ] SOB  [  ] Cough  [  ] None    Cardiovascular:   [ x ] None  [  ] CP  [  ] Palpitation    Gastrointestinal:  [  ] None  [  ] Abd pain   [ x ] Nausea    [ x ] Vomiting   [  ] Diarrhea	     Genitourinary:  [ x ] None [  ] Polyuria   [  ] Urgency  [  ] Frequency  [  ] Dysuria    [  ]  Hematuria       Musculoskeletal:  [  ] None [  ] Back Pain	[ x ] Body aches  [  ] Joint pain    Neurological: [  ] None [  ]Dizziness  [  ]Visual Disturbance  [  ]Headaches   [ x ] Weakness      PHYSICAL EXAM:    Vital Signs Last 24 Hrs  T(C): 36.8 (2019 17:49), Max: 37.2 (2019 08:08)  T(F): 98.2 (2019 17:49), Max: 98.9 (2019 08:08)  HR: 83 (2019 18:34) (63 - 114)  BP: 99/68 (2019 18:34) (90/49 - 124/77)  BP(mean): 79 (2019 18:34) (75 - 79)  RR: 13 (2019 18:34) (13 - 28)  SpO2: 99% (2019 18:34) (98% - 100%)    Constitutional:    HEENT: [ x ] Wnl  [  ] Pharyngeal congestion    Neck:  [ x ] Supple  [  ]Lymphadenopathy  [  ] No JVD   [  ] JVD  [  ] Masses   [  ] WNL    CHEST/Respiratory:  [ x ]Clear to auscultation  [  ] Rales   [  ] Rhonchi   [  ] Wheezing     [  ] Chest Tenderness      Cardiovascular:  [ x ] Reg S1 S2   [  ] Irreg S1 S2   [ x ]No Murmur  [  ] +ve Murmurs  [  ]Systolic [  ]Diastolic      Abdomen:  [ x ] Soft  [ x ] No tendrerness  [  ] Tenderness  [  ] Organomegaly  [  ] ABD Distention  [  ] Rigidity                       [ x ] No Regidity                       [ x ] No Rebound Tenderness  [  ] No Guarding Rigidity  [  ] Rebound Tenderness[  ] Guarding Rigidity                          [ x ]  +ve Bowel Sounds  [  ] Decreased Bowel Sounds    [  ] Absent Bowel Sounds                            Extremities: [ x ] No edema [  ] Edema  [  ] Clubbing   [  ] Cyanosis                         [ x ] No Tender Calf muscles  [  ] Tender Calf muscles                        [ x ] Palpable peripheral pulses    Neurological: [ x ] Awake  [ x ] Alert  [ x ] Oriented  x  3                           [  ] Confused  [  ] Drowsy  [  ] respond to painful stimuli  [  ] Unresponsive    Skin:  [  ] Intact [  ] Redness [  ] Thrombophlebitis  [  ] Rashes  [  ] Dry  [ x ] abscess on the right upper back with redness, induration and tenderness.    Ortho:  [  ] Joint Swelling  [  ] Joint erythema [  ] Joint tenderness                [  ] Increased temp. to touch  [  ] DJD [ x ] WNL      LABS/DIAGNOSTIC TESTS                          10.2   23.6  )-----------( 294      ( 2019 06:07 )             31.5     WBC Count: 23.6 K/uL ( @ 06:07)  WBC Count: 26.3 K/uL ( @ 15:45)          135  |  102  |  19<H>  ----------------------------<  328<H>  3.7   |  21<L>  |  2.42<H>    Ca    7.8<L>      2019 06:07  Phos  2.8       Mg     1.2         TPro  8.1  /  Alb  2.8<L>  /  TBili  0.8  /  DBili  x   /  AST  4<L>  /  ALT  16  /  AlkPhos  134<H>        Urinalysis Basic - ( 2019 19:47 )    Color: Yellow / Appearance: Slightly Turbid / S.015 / pH: x  Gluc: x / Ketone: Negative  / Bili: Negative / Urobili: Negative   Blood: x / Protein: 30 mg/dL / Nitrite: Negative   Leuk Esterase: Moderate / RBC: 10-25 /HPF / WBC >50 /HPF   Sq Epi: x / Non Sq Epi: Moderate /HPF / Bacteria: Many /HPF        LIVER FUNCTIONS - ( 2019 15:45 )  Alb: 2.8 g/dL / Pro: 8.1 g/dL / ALK PHOS: 134 U/L / ALT: 16 U/L DA / AST: 4 U/L / GGT: x             PT/INR - ( 2019 15:45 )   PT: 13.4 sec;   INR: 1.20 ratio         PTT - ( 2019 15:45 )  PTT:28.1 sec    LACTATE:      CULTURES:   Culture - Urine (19 @ 22:56)    Specimen Source: .Urine Clean Catch (Midstream)    Culture Results:   >100,000 CFU/ml Klebsiella pneumoniae        RADIOLOGY    CXR:    EXAM:  XR CHEST PA LAT 2V                            PROCEDURE DATE:  2019          INTERPRETATION:  Clinical Information: Dizziness    Technique: AP chest image.     Comparison: 2018    Findings: The heart is unremarkable. The lungs are clear. Bones are   unremarkable for age.    Impression: Clear lungs.

## 2019-01-14 NOTE — PROGRESS NOTE ADULT - PROBLEM SELECTOR PLAN 3
s/p LAD stent in dec, 2018   on brilinta and aspirin  at [place] - Dr. Joel Herndon SOB for about last month for which she was following up with her PCP and was told that it is 2/2 anxiety  - Most likely from pain at this time  - Ddimer 516  - T1 and T2 negative with recent cath - on aspirin and brilinta SOB for about last month for which she was following up with her PCP and was told that it is 2/2 anxiety  - Most likely from pain at this time  - Ddimer 516  - T1 and T2 negative with recent cath - on aspirin and brilinta  - F/u echo

## 2019-01-15 ENCOUNTER — TRANSCRIPTION ENCOUNTER (OUTPATIENT)
Age: 39
End: 2019-01-15

## 2019-01-15 LAB
-  AMIKACIN: SIGNIFICANT CHANGE UP
-  AMOXICILLIN/CLAVULANIC ACID: SIGNIFICANT CHANGE UP
-  AMPICILLIN/SULBACTAM: SIGNIFICANT CHANGE UP
-  AMPICILLIN: SIGNIFICANT CHANGE UP
-  AZTREONAM: SIGNIFICANT CHANGE UP
-  CEFAZOLIN: SIGNIFICANT CHANGE UP
-  CEFEPIME: SIGNIFICANT CHANGE UP
-  CEFOXITIN: SIGNIFICANT CHANGE UP
-  CEFTRIAXONE: SIGNIFICANT CHANGE UP
-  CIPROFLOXACIN: SIGNIFICANT CHANGE UP
-  ERTAPENEM: SIGNIFICANT CHANGE UP
-  GENTAMICIN: SIGNIFICANT CHANGE UP
-  IMIPENEM: SIGNIFICANT CHANGE UP
-  LEVOFLOXACIN: SIGNIFICANT CHANGE UP
-  MEROPENEM: SIGNIFICANT CHANGE UP
-  NITROFURANTOIN: SIGNIFICANT CHANGE UP
-  PIPERACILLIN/TAZOBACTAM: SIGNIFICANT CHANGE UP
-  TIGECYCLINE: SIGNIFICANT CHANGE UP
-  TOBRAMYCIN: SIGNIFICANT CHANGE UP
-  TRIMETHOPRIM/SULFAMETHOXAZOLE: SIGNIFICANT CHANGE UP
ANION GAP SERPL CALC-SCNC: 6 MMOL/L — SIGNIFICANT CHANGE UP (ref 5–17)
BASOPHILS # BLD AUTO: 0.1 K/UL — SIGNIFICANT CHANGE UP (ref 0–0.2)
BASOPHILS NFR BLD AUTO: 0.8 % — SIGNIFICANT CHANGE UP (ref 0–2)
BUN SERPL-MCNC: 16 MG/DL — SIGNIFICANT CHANGE UP (ref 7–18)
CALCIUM SERPL-MCNC: 8.1 MG/DL — LOW (ref 8.4–10.5)
CHLORIDE SERPL-SCNC: 111 MMOL/L — HIGH (ref 96–108)
CO2 SERPL-SCNC: 24 MMOL/L — SIGNIFICANT CHANGE UP (ref 22–31)
CREAT ?TM UR-MCNC: 52 MG/DL — SIGNIFICANT CHANGE UP
CREAT SERPL-MCNC: 2.03 MG/DL — HIGH (ref 0.5–1.3)
CULTURE RESULTS: SIGNIFICANT CHANGE UP
EOSINOPHIL # BLD AUTO: 0.4 K/UL — SIGNIFICANT CHANGE UP (ref 0–0.5)
EOSINOPHIL NFR BLD AUTO: 2.8 % — SIGNIFICANT CHANGE UP (ref 0–6)
GLUCOSE BLDC GLUCOMTR-MCNC: 154 MG/DL — HIGH (ref 70–99)
GLUCOSE BLDC GLUCOMTR-MCNC: 237 MG/DL — HIGH (ref 70–99)
GLUCOSE BLDC GLUCOMTR-MCNC: 238 MG/DL — HIGH (ref 70–99)
GLUCOSE BLDC GLUCOMTR-MCNC: 261 MG/DL — HIGH (ref 70–99)
GLUCOSE SERPL-MCNC: 199 MG/DL — HIGH (ref 70–99)
HCT VFR BLD CALC: 30 % — LOW (ref 34.5–45)
HGB BLD-MCNC: 9.6 G/DL — LOW (ref 11.5–15.5)
LYMPHOCYTES # BLD AUTO: 25.8 % — SIGNIFICANT CHANGE UP (ref 13–44)
LYMPHOCYTES # BLD AUTO: 4 K/UL — HIGH (ref 1–3.3)
MCHC RBC-ENTMCNC: 27.6 PG — SIGNIFICANT CHANGE UP (ref 27–34)
MCHC RBC-ENTMCNC: 31.8 GM/DL — LOW (ref 32–36)
MCV RBC AUTO: 87 FL — SIGNIFICANT CHANGE UP (ref 80–100)
METHOD TYPE: SIGNIFICANT CHANGE UP
MONOCYTES # BLD AUTO: 0.8 K/UL — SIGNIFICANT CHANGE UP (ref 0–0.9)
MONOCYTES NFR BLD AUTO: 5.3 % — SIGNIFICANT CHANGE UP (ref 2–14)
NEUTROPHILS # BLD AUTO: 10.1 K/UL — HIGH (ref 1.8–7.4)
NEUTROPHILS NFR BLD AUTO: 65.3 % — SIGNIFICANT CHANGE UP (ref 43–77)
ORGANISM # SPEC MICROSCOPIC CNT: SIGNIFICANT CHANGE UP
ORGANISM # SPEC MICROSCOPIC CNT: SIGNIFICANT CHANGE UP
OSMOLALITY UR: 282 MOS/KG — SIGNIFICANT CHANGE UP (ref 50–1200)
PLATELET # BLD AUTO: 240 K/UL — SIGNIFICANT CHANGE UP (ref 150–400)
POTASSIUM SERPL-MCNC: 4.7 MMOL/L — SIGNIFICANT CHANGE UP (ref 3.5–5.3)
POTASSIUM SERPL-SCNC: 4.7 MMOL/L — SIGNIFICANT CHANGE UP (ref 3.5–5.3)
PROT ?TM UR-MCNC: 52 MG/DL — HIGH (ref 0–12)
RBC # BLD: 3.45 M/UL — LOW (ref 3.8–5.2)
RBC # FLD: 11.8 % — SIGNIFICANT CHANGE UP (ref 10.3–14.5)
SODIUM SERPL-SCNC: 141 MMOL/L — SIGNIFICANT CHANGE UP (ref 135–145)
SODIUM UR-SCNC: 62 MMOL/L — SIGNIFICANT CHANGE UP (ref 40–220)
SPECIMEN SOURCE: SIGNIFICANT CHANGE UP
WBC # BLD: 15.5 K/UL — HIGH (ref 3.8–10.5)
WBC # FLD AUTO: 15.5 K/UL — HIGH (ref 3.8–10.5)

## 2019-01-15 RX ORDER — INSULIN GLARGINE 100 [IU]/ML
34 INJECTION, SOLUTION SUBCUTANEOUS AT BEDTIME
Qty: 0 | Refills: 0 | Status: DISCONTINUED | OUTPATIENT
Start: 2019-01-15 | End: 2019-01-16

## 2019-01-15 RX ORDER — AMPICILLIN SODIUM AND SULBACTAM SODIUM 250; 125 MG/ML; MG/ML
1.5 INJECTION, POWDER, FOR SUSPENSION INTRAMUSCULAR; INTRAVENOUS EVERY 6 HOURS
Qty: 0 | Refills: 0 | Status: DISCONTINUED | OUTPATIENT
Start: 2019-01-15 | End: 2019-01-16

## 2019-01-15 RX ADMIN — OXYCODONE AND ACETAMINOPHEN 1 TABLET(S): 5; 325 TABLET ORAL at 06:54

## 2019-01-15 RX ADMIN — TICAGRELOR 90 MILLIGRAM(S): 90 TABLET ORAL at 17:41

## 2019-01-15 RX ADMIN — Medication 12 UNIT(S): at 11:55

## 2019-01-15 RX ADMIN — OXYCODONE AND ACETAMINOPHEN 1 TABLET(S): 5; 325 TABLET ORAL at 06:29

## 2019-01-15 RX ADMIN — Medication 3: at 11:54

## 2019-01-15 RX ADMIN — Medication 12 UNIT(S): at 17:41

## 2019-01-15 RX ADMIN — OXYCODONE AND ACETAMINOPHEN 1 TABLET(S): 5; 325 TABLET ORAL at 21:23

## 2019-01-15 RX ADMIN — SODIUM CHLORIDE 100 MILLILITER(S): 9 INJECTION, SOLUTION INTRAVENOUS at 06:55

## 2019-01-15 RX ADMIN — AMPICILLIN SODIUM AND SULBACTAM SODIUM 100 GRAM(S): 250; 125 INJECTION, POWDER, FOR SUSPENSION INTRAMUSCULAR; INTRAVENOUS at 06:41

## 2019-01-15 RX ADMIN — Medication 2: at 08:28

## 2019-01-15 RX ADMIN — ATORVASTATIN CALCIUM 80 MILLIGRAM(S): 80 TABLET, FILM COATED ORAL at 21:23

## 2019-01-15 RX ADMIN — Medication 1: at 17:40

## 2019-01-15 RX ADMIN — TACROLIMUS 3 MILLIGRAM(S): 5 CAPSULE ORAL at 06:26

## 2019-01-15 RX ADMIN — Medication 81 MILLIGRAM(S): at 11:54

## 2019-01-15 RX ADMIN — AMPICILLIN SODIUM AND SULBACTAM SODIUM 100 GRAM(S): 250; 125 INJECTION, POWDER, FOR SUSPENSION INTRAMUSCULAR; INTRAVENOUS at 11:54

## 2019-01-15 RX ADMIN — OXYCODONE AND ACETAMINOPHEN 1 TABLET(S): 5; 325 TABLET ORAL at 22:00

## 2019-01-15 RX ADMIN — OXYCODONE AND ACETAMINOPHEN 1 TABLET(S): 5; 325 TABLET ORAL at 00:16

## 2019-01-15 RX ADMIN — AMPICILLIN SODIUM AND SULBACTAM SODIUM 100 GRAM(S): 250; 125 INJECTION, POWDER, FOR SUSPENSION INTRAMUSCULAR; INTRAVENOUS at 17:41

## 2019-01-15 RX ADMIN — Medication 5 MILLIGRAM(S): at 06:26

## 2019-01-15 RX ADMIN — TACROLIMUS 3 MILLIGRAM(S): 5 CAPSULE ORAL at 17:41

## 2019-01-15 RX ADMIN — Medication 12 UNIT(S): at 08:29

## 2019-01-15 RX ADMIN — TICAGRELOR 90 MILLIGRAM(S): 90 TABLET ORAL at 06:25

## 2019-01-15 RX ADMIN — INSULIN GLARGINE 34 UNIT(S): 100 INJECTION, SOLUTION SUBCUTANEOUS at 21:25

## 2019-01-15 NOTE — CONSULT NOTE ADULT - ASSESSMENT
38 year old female with history of right renal transplant, poorly controlled DM, and CAD s/p stent (2/18) with an abscess to right upper back     Plan:  Surgical drainage in OR this afternoon   Will re-check fingerstick and treat appropriately   Stat Type & screen  Consent to be obtained  Discussed with Dr. Roberts & he agrees
38/F with PMH of renal transplant in 1999, DM, CAD s/p stent LAD Dec, 2018 (on aspirin and Brilinta- [place]) came after fever and cellulitis of right shoulder started 3 days ago. Patient mentions that she was having SOB for about last month for which she was following up with her PCP and was told that it is 2/2 anxiety. About 2-3 days ago she started having subjective fever and generalized weakness about day later her son noted small pimple on her back which got worse to diffuse cellulitis around right scapular region over next 12 hours. She noted that area was red, and warm. Also had fever of 102.5 with chills for which she took tylenol and rested but next day she continued to have episodes of fever which brought her to hospital.  Patient was started on IV Unasyn and was given Vancomycin in the ED.
38/F with PMH of renal transplant in 1999, DM, CAD s/p stent LAD Dec, 2018 (on aspirin and Brilinta- [place]) came after fever and cellulitis of right shoulder started 3 days ago. Endocrinology was consulted for uncontrolled DM.
Real transplant with base line creatinine 1.4  MICHAEL due to sepsis - SIR , hypovolemia, hypotension.  Urinary fluid losses due to glycosuria.    Continue IV fluids, ringer lactate 100 ml /hour.  Urine for lytes and osmolality  Follow up TP and Albumin  Follow urine for protein.    Await BC results  Urine with WBC and RBC , and many bacteria r/o UTI.  Glycosuria with elevated HB A1C.  Follow bs and treat .    ID follow up.

## 2019-01-15 NOTE — PROGRESS NOTE ADULT - SUBJECTIVE AND OBJECTIVE BOX
PGY1 Note discussed with Supervising Resident and Primary Attending.    Patient is a 38y old  Female who presents with a chief complaint of fever and right shoulder cellulitis (2019 23:04)      INTERVAL HPI/OVERNIGHT EVENTS :  Patient went to OR for I&D of right back abscess. Patient seen and examined at bedside. Reports some pain over the region but is feeling better. Patient denies nausea, vomiting, diarrhea, chest pain, SOB, headache, chills.    MEDICATIONS  (STANDING):  ampicillin/sulbactam  IVPB 1.5 Gram(s) IV Intermittent every 6 hours  aspirin enteric coated 81 milliGRAM(s) Oral daily  atorvastatin 80 milliGRAM(s) Oral at bedtime  insulin glargine Injectable (LANTUS) 30 Unit(s) SubCutaneous at bedtime  insulin lispro (HumaLOG) corrective regimen sliding scale   SubCutaneous three times a day before meals  insulin lispro (HumaLOG) corrective regimen sliding scale   SubCutaneous at bedtime  insulin lispro Injectable (HumaLOG) 12 Unit(s) SubCutaneous three times a day before meals  metoprolol succinate ER 25 milliGRAM(s) Oral daily  NIFEdipine XL 60 milliGRAM(s) Oral daily  predniSONE   Tablet 5 milliGRAM(s) Oral daily  tacrolimus 3 milliGRAM(s) Oral every 12 hours  ticagrelor 90 milliGRAM(s) Oral two times a day    MEDICATIONS  (PRN):  oxyCODONE    5 mG/acetaminophen 325 mG 1 Tablet(s) Oral every 4 hours PRN Moderate Pain (4 - 6)      Allergies    No Known Allergies    Intolerances        REVIEW OF SYSTEMS:  * General: denies chills, fatigue  * Eyes: denies vision changes  * Respiratory: denies cough, SOB, wheezing  * Cardio: denies chest pain, palpitations  * GI: denies abd pain, nausea, vomiting, diarrhea  * : denies dysuria  * Neuro: denies headaches, numbness, weakness  * MSK: denies joint pain  * Skin: denies itching, rashes; reports pain over right back cellulitis/abscess    Vital Signs Last 24 Hrs  T(C): 36.8 (15 Mo 2019 08:08), Max: 36.8 (2019 17:49)  T(F): 98.3 (15 Mo 2019 08:08), Max: 98.3 (2019 23:50)  HR: 90 (15 Mo 2019 08:08) (83 - 99)  BP: 111/70 (15 Mo 2019 08:08) (93/67 - 124/77)  BP(mean): 78 (2019 18:49) (75 - 79)  RR: 17 (15 Mo 2019 08:08) (13 - 28)  SpO2: 96% (15 Mo 2019 08:08) (96% - 100%)    PHYSICAL EXAM:  * General: no acute distress, well-nourished, well-developed  * HEENT: atraumatic, normocephalic; moist mucus membranes; supple neck; no JVD  * CN: EOMI, PERRL  * Respiratory: cta b/l; no wheezes, rales, rhonchi  * Cardio: RRR; no appreciable murmurs, rubs, gallops  * Abd: soft, nontender, nondistended, +BS  * Neuro: AAOx3; strength 5/5; sensation intact throughout  * Extremities: no clubbing, cyanosis, edema  * Skin: no rashes; area of cellulitis on right back/shoulder, tender to palpation, covered in clean dry dressing    LABS:                          9.6    15.5  )-----------( 240      ( 15 Mo 2019 05:52 )             30.0     01-15    141  |  111<H>  |  16  ----------------------------<  199<H>  4.7   |  24  |  2.03<H>    Ca    8.1<L>      15 Mo 2019 05:52  Phos  2.8     -14  Mg     1.2     -14    TPro  8.1  /  Alb  2.8<L>  /  TBili  0.8  /  DBili  x   /  AST  4<L>  /  ALT  16  /  AlkPhos  134<H>  01-13    PT/INR - ( 2019 15:45 )   PT: 13.4 sec;   INR: 1.20 ratio         PTT - ( 2019 15:45 )  PTT:28.1 sec  Urinalysis Basic - ( 2019 19:47 )    Color: Yellow / Appearance: Slightly Turbid / S.015 / pH: x  Gluc: x / Ketone: Negative  / Bili: Negative / Urobili: Negative   Blood: x / Protein: 30 mg/dL / Nitrite: Negative   Leuk Esterase: Moderate / RBC: 10-25 /HPF / WBC >50 /HPF   Sq Epi: x / Non Sq Epi: Moderate /HPF / Bacteria: Many /HPF      CAPILLARY BLOOD GLUCOSE      POCT Blood Glucose.: 238 mg/dL (15 Mo 2019 08:24)  POCT Blood Glucose.: 245 mg/dL (2019 22:47)  POCT Blood Glucose.: 170 mg/dL (2019 17:52)  POCT Blood Glucose.: 249 mg/dL (2019 14:06)  POCT Blood Glucose.: 363 mg/dL (2019 11:49)      RADIOLOGY & ADDITIONAL TESTS:   no new imaging    Consultant(s) Notes Reviewed: yes

## 2019-01-15 NOTE — PROGRESS NOTE ADULT - PROBLEM SELECTOR PLAN 7
On lantus 30 units, Humalog 12 units TID at home   - C/w home dose insulin and sliding scale   - HbA1c 10.9

## 2019-01-15 NOTE — PROGRESS NOTE ADULT - PROBLEM SELECTOR PLAN 2
Asymptomatic, positive UA  - F/u urine cx - corynebacterium  - C/w cipro and flagyl  - ID Dr Ovalles

## 2019-01-15 NOTE — PROGRESS NOTE ADULT - PROBLEM SELECTOR PLAN 3
SOB for about last month for which she was following up with her PCP and was told that it is 2/2 anxiety  - Resolved, most likely from pain  - Ddimer 516  - T1 and T2 negative with recent cath - on aspirin and brilinta  - F/u echo

## 2019-01-15 NOTE — CONSULT NOTE ADULT - SUBJECTIVE AND OBJECTIVE BOX
Patient is a 38y old  Female who presents with a chief complaint of fever and right shoulder cellulitis (15 Mo 2019 09:54)    HPI:  38/F with PMH of renal transplant in , DM, CAD s/p stent LAD Dec, 2018 (on aspirin and Brilinta- [place]) came after fever and cellulitis of right shoulder started 3 days ago. Patient mentions that she was having SOB for about last month for which she was following up with her PCP and was told that it is 2/2 anxiety. About 2-3 days ago she started having subjective fever and generalized weakness about day later her son noted small pimple on her back which got worse to diffuse cellulitis around right scapular region over next 12 hours. She noted that area was red, and warm. Also had fever of 102.5 with chills for which she took tylenol and rested but next day she continued to have episodes of fever which brought her to hospital. Denies nausea, vomiting, diarrhea, abdominal pain, chest pain, palpitations, dizziness, headache, cough, wheezing, joint pain or swelling. (2019 22:55)    Endocrinology was consulted for uncontrolled DM. Patient was seen and examined, reports that she has not seen an endocrinologist in 2 years and has been on 12 units novolog TID and levemir 30 units at night and prior to 3 weeks ago had fingersticks in the 100-200s but over the past 3 weeks while having R shoulder pain and infection has been ranging in the 300s-400s. Scheduled to see a endocrinologist as outpatient in [facility] on . Reports feeling stressed as well, but overall feels well.    PAST MEDICAL & SURGICAL HISTORY:  CAD (coronary artery disease)  Kidney transplant recipient  DM (diabetes mellitus)  No significant past surgical history    MEDICATIONS  (STANDING):  ampicillin/sulbactam  IVPB 1.5 Gram(s) IV Intermittent every 6 hours  aspirin enteric coated 81 milliGRAM(s) Oral daily  atorvastatin 80 milliGRAM(s) Oral at bedtime  insulin glargine Injectable (LANTUS) 30 Unit(s) SubCutaneous at bedtime  insulin lispro (HumaLOG) corrective regimen sliding scale   SubCutaneous three times a day before meals  insulin lispro (HumaLOG) corrective regimen sliding scale   SubCutaneous at bedtime  insulin lispro Injectable (HumaLOG) 12 Unit(s) SubCutaneous three times a day before meals  metoprolol succinate ER 25 milliGRAM(s) Oral daily  NIFEdipine XL 60 milliGRAM(s) Oral daily  predniSONE   Tablet 5 milliGRAM(s) Oral daily  tacrolimus 3 milliGRAM(s) Oral every 12 hours  ticagrelor 90 milliGRAM(s) Oral two times a day    MEDICATIONS  (PRN):  oxyCODONE    5 mG/acetaminophen 325 mG 1 Tablet(s) Oral every 4 hours PRN Moderate Pain (4 - 6)    FAMILY HISTORY:  No pertinent family history in first degree relatives    REVIEW OF SYSTEMS:  CONSTITUTIONAL: No fever, weight loss, or fatigue  EYES: No eye pain, visual disturbances, or discharge  ENT:  No difficulty hearing, tinnitus, vertigo; No sinus or throat pain  NECK: No pain or stiffness  RESPIRATORY: No cough, wheezing, chills or hemoptysis; No Shortness of Breath  CARDIOVASCULAR: No chest pain, palpitations, passing out, dizziness, or leg swelling  GASTROINTESTINAL: No abdominal or epigastric pain. No nausea, vomiting, or hematemesis; No diarrhea or constipation. No melena or hematochezia.  GENITOURINARY: No dysuria, frequency, hematuria, or incontinence  NEUROLOGICAL: No headaches, memory loss, loss of strength, numbness, or tremors  SKIN: No itching, burning, rashes, or lesions   LYMPH Nodes: No enlarged glands  ENDOCRINE: No heat or cold intolerance; No hair loss  MUSCULOSKELETAL: +R shoulder pain  PSYCHIATRIC: No depression, anxiety, mood swings, or difficulty sleeping; + feels like she is under stress  HEME/LYMPH: No easy bruising, or bleeding gums  ALLERGY AND IMMUNOLOGIC: No hives or eczema	    Vital Signs Last 24 Hrs  T(C): 36.8 (15 Mo 2019 08:08), Max: 36.8 (2019 17:49)  T(F): 98.3 (15 Mo 2019 08:08), Max: 98.3 (2019 23:50)  HR: 90 (15 Mo 2019 08:08) (83 - 99)  BP: 111/70 (15 Mo 2019 08:08) (93/67 - 124/77)  BP(mean): 78 (2019 18:49) (75 - 79)  RR: 17 (15 Mo 2019 08:08) (13 - 28)  SpO2: 96% (15 Mo 2019 08:08) (96% - 100%)    Constitutional: obese    NC/AT:    HEENT:    Neck:  No JVD, bruits or thyromegaly    Respiratory:  Clear without rales or rhonchi    Cardiovascular:  RR without murmur, rub or gallop.    Gastrointestinal: Soft without hepatosplenomegaly.    Extremities: without cyanosis, clubbing or edema.    Neurological:  Oriented   x  3    . No gross sensory or motor defects.        LABS:                        9.6    15.5  )-----------( 240      ( 15 Mo 2019 05:52 )             30.0     01-15    141  |  111<H>  |  16  ----------------------------<  199<H>  4.7   |  24  |  2.03<H>    Ca    8.1<L>      15 Mo 2019 05:52  Phos  2.8       Mg     1.2         TPro  8.1  /  Alb  2.8<L>  /  TBili  0.8  /  DBili  x   /  AST  4<L>  /  ALT  16  /  AlkPhos  134<H>      CARDIAC MARKERS ( 2019 00:17 )  <0.015 ng/mL / x     / 46 U/L / x     / <1.0 ng/mL  CARDIAC MARKERS ( 2019 15:45 )  <0.015 ng/mL / x     / x     / x     / x        PT/INR - ( 2019 15:45 )   PT: 13.4 sec;   INR: 1.20 ratio      PTT - ( 2019 15:45 )  PTT:28.1 sec  Urinalysis Basic - ( 2019 19:47 )    Color: Yellow / Appearance: Slightly Turbid / S.015 / pH: x  Gluc: x / Ketone: Negative  / Bili: Negative / Urobili: Negative   Blood: x / Protein: 30 mg/dL / Nitrite: Negative   Leuk Esterase: Moderate / RBC: 10-25 /HPF / WBC >50 /HPF   Sq Epi: x / Non Sq Epi: Moderate /HPF / Bacteria: Many /HPF    POCT Blood Glucose.: 238 mg/dL (15 Mo 2019 08:24)  POCT Blood Glucose.: 245 mg/dL (2019 22:47)  POCT Blood Glucose.: 170 mg/dL (2019 17:52)  POCT Blood Glucose.: 249 mg/dL (2019 14:06)  POCT Blood Glucose.: 363 mg/dL (2019 11:49) Patient is a 38y old  Female who presents with a chief complaint of fever and right shoulder cellulitis (15 Mo 2019 09:54)    HPI:  38/F with PMH of renal transplant in , DM, CAD s/p stent LAD Dec, 2018 (on aspirin and Brilinta- [place]) came after fever and cellulitis of right shoulder started 3 days ago. Patient mentions that she was having SOB for about last month for which she was following up with her PCP and was told that it is 2/2 anxiety. About 2-3 days ago she started having subjective fever and generalized weakness about day later her son noted small pimple on her back which got worse to diffuse cellulitis around right scapular region over next 12 hours. She noted that area was red, and warm. Also had fever of 102.5 with chills for which she took tylenol and rested but next day she continued to have episodes of fever which brought her to hospital. Denies nausea, vomiting, diarrhea, abdominal pain, chest pain, palpitations, dizziness, headache, cough, wheezing, joint pain or swelling. (2019 22:55)    Endocrinology was consulted for uncontrolled DM. Patient was seen and examined, reports that she has not seen an endocrinologist in 2 years and has been on 12 units novolog TID and levemir 30 units at night and prior to 3 weeks ago had fingersticks in the 100-200s but over the past 3 weeks while having R shoulder pain and infection has been ranging in the 300s-400s. Scheduled to see a endocrinologist as outpatient in [facility] on . Reports feeling stressed as well, but overall feels well.    PAST MEDICAL & SURGICAL HISTORY:  CAD (coronary artery disease)  Kidney transplant recipient  DM (diabetes mellitus)  No significant past surgical history    MEDICATIONS  (STANDING):  ampicillin/sulbactam  IVPB 1.5 Gram(s) IV Intermittent every 6 hours  aspirin enteric coated 81 milliGRAM(s) Oral daily  atorvastatin 80 milliGRAM(s) Oral at bedtime  insulin glargine Injectable (LANTUS) 30 Unit(s) SubCutaneous at bedtime  insulin lispro (HumaLOG) corrective regimen sliding scale   SubCutaneous three times a day before meals  insulin lispro (HumaLOG) corrective regimen sliding scale   SubCutaneous at bedtime  insulin lispro Injectable (HumaLOG) 12 Unit(s) SubCutaneous three times a day before meals  metoprolol succinate ER 25 milliGRAM(s) Oral daily  NIFEdipine XL 60 milliGRAM(s) Oral daily  predniSONE   Tablet 5 milliGRAM(s) Oral daily  tacrolimus 3 milliGRAM(s) Oral every 12 hours  ticagrelor 90 milliGRAM(s) Oral two times a day    MEDICATIONS  (PRN):  oxyCODONE    5 mG/acetaminophen 325 mG 1 Tablet(s) Oral every 4 hours PRN Moderate Pain (4 - 6)    FAMILY HISTORY:  No pertinent family history in first degree relatives    REVIEW OF SYSTEMS:  CONSTITUTIONAL: No fever, weight loss, or fatigue  EYES: No eye pain, visual disturbances, or discharge  ENT:  No difficulty hearing, tinnitus, vertigo; No sinus or throat pain  NECK: No pain or stiffness  RESPIRATORY: No cough, wheezing, chills or hemoptysis; No Shortness of Breath  CARDIOVASCULAR: No chest pain, palpitations, passing out, dizziness, or leg swelling  GASTROINTESTINAL: No abdominal or epigastric pain. No nausea, vomiting, or hematemesis; No diarrhea or constipation. No melena or hematochezia.  GENITOURINARY: No dysuria, frequency, hematuria, or incontinence  NEUROLOGICAL: No headaches, memory loss, loss of strength, numbness, or tremors  SKIN: No itching, burning, rashes, or lesions   LYMPH Nodes: No enlarged glands  ENDOCRINE: No heat or cold intolerance; No hair loss  MUSCULOSKELETAL: +R shoulder pain  PSYCHIATRIC: No depression, anxiety, mood swings, or difficulty sleeping; + feels like she is under stress  HEME/LYMPH: No easy bruising, or bleeding gums  ALLERGY AND IMMUNOLOGIC: No hives or eczema	    Vital Signs Last 24 Hrs  T(C): 36.8 (15 Mo 2019 08:08), Max: 36.8 (2019 17:49)  T(F): 98.3 (15 Mo 2019 08:08), Max: 98.3 (2019 23:50)  HR: 90 (15 Mo 2019 08:08) (83 - 99)  BP: 111/70 (15 Mo 2019 08:08) (93/67 - 124/77)  BP(mean): 78 (2019 18:49) (75 - 79)  RR: 17 (15 Mo 2019 08:08) (13 - 28)  SpO2: 96% (15 Mo 2019 08:08) (96% - 100%)    Constitutional: obese      HEENT: nad    Neck:  No JVD, bruits or thyromegaly    Respiratory:  Clear without rales or rhonchi    Cardiovascular:  RR without murmur, rub or gallop.    Gastrointestinal: Soft without hepatosplenomegaly.    Extremities: without cyanosis, clubbing or edema.    Neurological:  Oriented   x  3    . No gross sensory or motor defects.        LABS:                        9.6    15.5  )-----------( 240      ( 15 Mo 2019 05:52 )             30.0     01-15    141  |  111<H>  |  16  ----------------------------<  199<H>  4.7   |  24  |  2.03<H>    Ca    8.1<L>      15 Mo 2019 05:52  Phos  2.8       Mg     1.2         TPro  8.1  /  Alb  2.8<L>  /  TBili  0.8  /  DBili  x   /  AST  4<L>  /  ALT  16  /  AlkPhos  134<H>      CARDIAC MARKERS ( 2019 00:17 )  <0.015 ng/mL / x     / 46 U/L / x     / <1.0 ng/mL  CARDIAC MARKERS ( 2019 15:45 )  <0.015 ng/mL / x     / x     / x     / x        PT/INR - ( 2019 15:45 )   PT: 13.4 sec;   INR: 1.20 ratio      PTT - ( 2019 15:45 )  PTT:28.1 sec  Urinalysis Basic - ( 2019 19:47 )    Color: Yellow / Appearance: Slightly Turbid / S.015 / pH: x  Gluc: x / Ketone: Negative  / Bili: Negative / Urobili: Negative   Blood: x / Protein: 30 mg/dL / Nitrite: Negative   Leuk Esterase: Moderate / RBC: 10-25 /HPF / WBC >50 /HPF   Sq Epi: x / Non Sq Epi: Moderate /HPF / Bacteria: Many /HPF    POCT Blood Glucose.: 238 mg/dL (15 Mo 2019 08:24)  POCT Blood Glucose.: 245 mg/dL (2019 22:47)  POCT Blood Glucose.: 170 mg/dL (2019 17:52)  POCT Blood Glucose.: 249 mg/dL (2019 14:06)  POCT Blood Glucose.: 363 mg/dL (2019 11:49)

## 2019-01-15 NOTE — CONSULT NOTE ADULT - REASON FOR ADMISSION
fever and right shoulder cellulitis

## 2019-01-15 NOTE — PROGRESS NOTE ADULT - PROBLEM SELECTOR PLAN 1
Presented with right shoulder erythema, swelling and pain started 2 days ago   - S/p clinda in ED   - S/p 1 dose of vanco and zosyn - given worsening renal function in setting of renal transplant  - C/w unasyn  - Blood cx ngtd  - POD1 s/p I&D  - ID Dr Ovalles

## 2019-01-15 NOTE — PROGRESS NOTE ADULT - SUBJECTIVE AND OBJECTIVE BOX
pt s- new complaints  ICU Vital Signs Last 24 Hrs  T(C): 36.8 (15 Mo 2019 08:08), Max: 36.8 (14 Jan 2019 17:49)  T(F): 98.3 (15 Mo 2019 08:08), Max: 98.3 (14 Jan 2019 23:50)  HR: 90 (15 Mo 2019 08:08) (83 - 99)  BP: 111/70 (15 Mo 2019 08:08) (93/67 - 124/77)  BP(mean): 78 (14 Jan 2019 18:49) (75 - 79)  ABP: --  ABP(mean): --  RR: 17 (15 Mo 2019 08:08) (13 - 28)  SpO2: 96% (15 Mo 2019 08:08) (96% - 100%)    right upper back/shoulder wound: base with granulation tissue, no purulence no bleed, some necrotic tissue at wound edges. no surrounding erythema or crepitus.                          9.6    15.5  )-----------( 240      ( 15 Mo 2019 05:52 )             30.0     01-15    141  |  111<H>  |  16  ----------------------------<  199<H>  4.7   |  24  |  2.03<H>    Ca    8.1<L>      15 Mo 2019 05:52  Phos  2.8     01-14  Mg     1.2     01-14    TPro  8.1  /  Alb  2.8<L>  /  TBili  0.8  /  DBili  x   /  AST  4<L>  /  ALT  16  /  AlkPhos  134<H>  01-13

## 2019-01-15 NOTE — PROGRESS NOTE ADULT - PROBLEM SELECTOR PLAN 5
BUN/Cr 19/2.33 (baseline 14/1.18)  - Pre-renal vs worsening renal function given transplant status  - Called [facility] renal transplant center [phone], spoke with coordinator who is having the patient's nurse call me back. As per coordinator, the patient no longer follows at [place].

## 2019-01-15 NOTE — CONSULT NOTE ADULT - PROBLEM SELECTOR RECOMMENDATION 9
uncontrolled DM  HbA1C 10.9  home meds: levemir 30 units and 12 premeal novolog TID  would switch to 34 units lantus hs and 12 units humalog  no oral DM meds 2/2 CKD and renal transplant  monitor FS ac hs uncontrolled DM  HbA1C 10.9  home meds: levemir 30 units and 12 premeal novolog TID  would switch to 34 units lantus hs and 12 units humalog  no oral DM meds 2/2 CKD and renal transplant  monitor FS ac hs  nutrition eval

## 2019-01-15 NOTE — PROGRESS NOTE ADULT - SUBJECTIVE AND OBJECTIVE BOX
Patient is a 38y old  Female who presents with a chief complaint of fever and right shoulder cellulitis (15 Mo 2019 10:04)    pt seen in icu [  ], reg med floor [ x  ], bed [ x ], chair at bedside [   ], a+o x3 [ x ], lethargic [  ],  nad [ x ]      Allergies    No Known Allergies        Vitals    T(F): 98.3 (01-15-19 @ 08:08), Max: 98.3 (01-14-19 @ 23:50)  HR: 90 (01-15-19 @ 08:08) (83 - 99)  BP: 111/70 (01-15-19 @ 08:08) (93/67 - 124/77)  RR: 17 (01-15-19 @ 08:08) (13 - 28)  SpO2: 96% (01-15-19 @ 08:08) (96% - 100%)  Wt(kg): --  CAPILLARY BLOOD GLUCOSE      POCT Blood Glucose.: 261 mg/dL (15 Mo 2019 11:46)      Labs                          9.6    15.5  )-----------( 240      ( 15 Mo 2019 05:52 )             30.0       01-15    141  |  111<H>  |  16  ----------------------------<  199<H>  4.7   |  24  |  2.03<H>    Ca    8.1<L>      15 Mo 2019 05:52  Phos  2.8     01-14  Mg     1.2     01-14    TPro  8.1  /  Alb  2.8<L>  /  TBili  0.8  /  DBili  x   /  AST  4<L>  /  ALT  16  /  AlkPhos  134<H>  01-13      CARDIAC MARKERS ( 14 Jan 2019 00:17 )  <0.015 ng/mL / x     / 46 U/L / x     / <1.0 ng/mL  CARDIAC MARKERS ( 13 Jan 2019 15:45 )  <0.015 ng/mL / x     / x     / x     / x            .Blood Blood  01-13 @ 22:56   No growth to date.  --  --          Radiology Results      Meds    MEDICATIONS  (STANDING):  ampicillin/sulbactam  IVPB 1.5 Gram(s) IV Intermittent every 6 hours  aspirin enteric coated 81 milliGRAM(s) Oral daily  atorvastatin 80 milliGRAM(s) Oral at bedtime  insulin glargine Injectable (LANTUS) 34 Unit(s) SubCutaneous at bedtime  insulin lispro (HumaLOG) corrective regimen sliding scale   SubCutaneous three times a day before meals  insulin lispro (HumaLOG) corrective regimen sliding scale   SubCutaneous at bedtime  insulin lispro Injectable (HumaLOG) 12 Unit(s) SubCutaneous three times a day before meals  metoprolol succinate ER 25 milliGRAM(s) Oral daily  NIFEdipine XL 60 milliGRAM(s) Oral daily  predniSONE   Tablet 5 milliGRAM(s) Oral daily  tacrolimus 3 milliGRAM(s) Oral every 12 hours  ticagrelor 90 milliGRAM(s) Oral two times a day      MEDICATIONS  (PRN):  oxyCODONE    5 mG/acetaminophen 325 mG 1 Tablet(s) Oral every 4 hours PRN Moderate Pain (4 - 6)      Physical Exam      Neuro :  no focal deficits  Respiratory: CTA B/L  CV: RRR, S1S2, no murmurs,   Abdominal: Soft, NT, ND +BS,  Extremities: No edema, + peripheral pulses, right suprascapular wound with packing cdi    ASSESSMENT    right shoulder Cellulitis with abcess  uti  acute on ckd  sob  uncontrolled dm  h/o CAD (coronary artery disease)  s/p stent LAD Dec, 2018 (on aspirin and Brilinta- [place])  Kidney transplant recipient  DM (diabetes mellitus)        PLAN      cont unasyn  id f/u  blood cx neg  f/u ucx  surgery f/u  s/p i&d  f/u i&d cx  cont iv fluids  renal f/u  sob possible 2nd to anxiety  pulm f/u  cxr with clear lungs noted above  endo cons  cont lantus 34 units qhs and 12 units humalog actid  hss  cont current meds

## 2019-01-15 NOTE — PROGRESS NOTE ADULT - SUBJECTIVE AND OBJECTIVE BOX
Problem List:  Post living donor renal transplant, transplanted in  1999, base line creatinine 1.4  MICHAEL associated with SIR and possible UTI.      Culture - Urine (01.13.19 @ 22:56)    -  Amikacin: S <=8    -  Amoxicillin/Clavulanic Acid: S <=8/4    -  Ampicillin: R >16 These ampicillin results predict results for amoxicillin    -  Ampicillin/Sulbactam: I 16/8    -  Aztreonam: S <=4    -  Cefazolin: I 4 For uncomplicated UTI with K. pneumoniae, E. coli, or P. mirablis: JACINDA <=16 is sensitive and JACINDA >=32 is resistant. This also predicts results for oral agents cefaclor, cefdinir, cefpodoxime, cefprozil, cefuroxime axetil, cephalexin and locarbef for uncomplicated UTI. Note that some isolates may be susceptible to these agents while testing resistant to cefazolin.    -  Cefepime: S <=2  Culture - Blood (01.13.19 @ 22:56)    Specimen Source: .Blood Blood    Culture Results:   No growth to date.      -  Cefoxitin: S <=4    -  Ceftriaxone: S <=1 Enterobacter, Citrobacter, and Serratia may develop resistance during prolonged therapy    -  Ciprofloxacin: S <=0.5    -  Ertapenem: S <=0.5    -  Gentamicin: S <=1    -  Imipenem: S <=1    -  Levofloxacin: S <=1    -  Meropenem: S <=1    -  Nitrofurantoin: S <=32 Should not be used to treat pyelonephritis    -  Piperacillin/Tazobactam: S <=8    -  Tigecycline: S <=1    -  Tobramycin: S <=2    -  Trimethoprim/Sulfamethoxazole: R >2/38    Specimen Source: .Urine Clean Catch (Midstream)    Culture Results:   >100,000 CFU/ml Klebsiella pneumoniae    Organism Identification: Klebsiella pneumoniae    Organism: Klebsiella pneumoniae    Method Type: JACINDA      PAST MEDICAL & SURGICAL HISTORY:  CAD (coronary artery disease)  Kidney transplant recipient  DM (diabetes mellitus)  No significant past surgical history      No Known Allergies      MEDICATIONS  (STANDING):  ampicillin/sulbactam  IVPB 1.5 Gram(s) IV Intermittent every 6 hours  aspirin enteric coated 81 milliGRAM(s) Oral daily  atorvastatin 80 milliGRAM(s) Oral at bedtime  insulin glargine Injectable (LANTUS) 34 Unit(s) SubCutaneous at bedtime  insulin lispro (HumaLOG) corrective regimen sliding scale   SubCutaneous three times a day before meals  insulin lispro (HumaLOG) corrective regimen sliding scale   SubCutaneous at bedtime  insulin lispro Injectable (HumaLOG) 12 Unit(s) SubCutaneous three times a day before meals  metoprolol succinate ER 25 milliGRAM(s) Oral daily  NIFEdipine XL 60 milliGRAM(s) Oral daily  predniSONE   Tablet 5 milliGRAM(s) Oral daily  tacrolimus 3 milliGRAM(s) Oral every 12 hours  ticagrelor 90 milliGRAM(s) Oral two times a day    MEDICATIONS  (PRN):  oxyCODONE    5 mG/acetaminophen 325 mG 1 Tablet(s) Oral every 4 hours PRN Moderate Pain (4 - 6)    Creatinine, Serum: 2.42 mg/dL (01.14.19 @ 06:07)                            9.6    15.5  )-----------( 240      ( 15 Mo 2019 05:52 )             30.0     01-15    141  |  111<H>  |  16  ----------------------------<  199<H>  4.7   |  24  |  2.03<H>    Ca    8.1<L>      15 Mo 2019 05:52  Phos  2.8     01-14  Mg     1.2     01-14          Osmolality, Random Urine: 282 mos/kg (01-15 @ 06:18)  Sodium, Random Urine: 62 mmol/L (01-15 @ 06:17)  Creatinine, Random Urine: 52 mg/dL (01-15 @ 06:17)      REVIEW OF SYSTEMS:  General: no fever no chills, no weight loss.  EYES/ENT: No visual changes;  No vertigo, no headache.  RESPIRATORY: No cough, wheezing, hemoptysis; No shortness of breath  CARDIOVASCULAR: No chest pain or palpitations. No Edema  GASTROINTESTINAL: No abdominal or epigastric pain. No nausea, vomiting. No diarrhea or constipation. No melena.  GENITOURINARY: No dysuria, frequency, foamy urine, urinary urgency, incontinence or hematuria  NEUROLOGICAL: No numbness or weakness, no tremor , no dizziness.   Muscle skeletal : no joint pain and no swelling of joints and limbs.  SKIN: POST SURGERY WITH DRESSING IN BACK AREA  Pain reduced.        VITALS:  T(F): 97.9 (01-15-19 @ 15:51), Max: 98.3 (01-14-19 @ 23:50)  HR: 88 (01-15-19 @ 15:51)  BP: 105/64 (01-15-19 @ 15:51)  RR: 16 (01-15-19 @ 15:51)  SpO2: 100% (01-15-19 @ 15:51)  Wt(kg): --    01-14 @ 07:01  -  01-15 @ 07:00  --------------------------------------------------------  IN: 700 mL / OUT: 0 mL / NET: 700 mL        PHYSICAL EXAM:  Constitutional: well developed, no diaphoresis, no distress.  Neck: No JVD, no carotid bruit, supple, no adenopathy  Respiratory: Good air entrance B/L, no wheezes, rales or rhonchi  Cardiovascular: S1, S2, RRR, no pericardial rub, no murmur  Abdomen: BS+, soft, no tenderness, no bruit  Pelvis: bladder nondistended, right side pelvic area , kidney palpable with no tenderness  Extremities: No cyanosis or clubbing. No peripheral edema.     Neurological: A/O x 3, no focal deficits  Psychiatric: Normal mood, normal affect  Skin: No rashes

## 2019-01-16 ENCOUNTER — TRANSCRIPTION ENCOUNTER (OUTPATIENT)
Age: 39
End: 2019-01-16

## 2019-01-16 LAB
ANION GAP SERPL CALC-SCNC: 8 MMOL/L — SIGNIFICANT CHANGE UP (ref 5–17)
BUN SERPL-MCNC: 19 MG/DL — HIGH (ref 7–18)
CALCIUM SERPL-MCNC: 8.6 MG/DL — SIGNIFICANT CHANGE UP (ref 8.4–10.5)
CHLORIDE SERPL-SCNC: 113 MMOL/L — HIGH (ref 96–108)
CO2 SERPL-SCNC: 22 MMOL/L — SIGNIFICANT CHANGE UP (ref 22–31)
CREAT SERPL-MCNC: 2.1 MG/DL — HIGH (ref 0.5–1.3)
GLUCOSE BLDC GLUCOMTR-MCNC: 236 MG/DL — HIGH (ref 70–99)
GLUCOSE BLDC GLUCOMTR-MCNC: 261 MG/DL — HIGH (ref 70–99)
GLUCOSE BLDC GLUCOMTR-MCNC: 262 MG/DL — HIGH (ref 70–99)
GLUCOSE BLDC GLUCOMTR-MCNC: 263 MG/DL — HIGH (ref 70–99)
GLUCOSE SERPL-MCNC: 187 MG/DL — HIGH (ref 70–99)
HCT VFR BLD CALC: 31.1 % — LOW (ref 34.5–45)
HGB BLD-MCNC: 9.7 G/DL — LOW (ref 11.5–15.5)
MCHC RBC-ENTMCNC: 27.4 PG — SIGNIFICANT CHANGE UP (ref 27–34)
MCHC RBC-ENTMCNC: 31.1 GM/DL — LOW (ref 32–36)
MCV RBC AUTO: 88 FL — SIGNIFICANT CHANGE UP (ref 80–100)
PLATELET # BLD AUTO: 246 K/UL — SIGNIFICANT CHANGE UP (ref 150–400)
POTASSIUM SERPL-MCNC: 4.4 MMOL/L — SIGNIFICANT CHANGE UP (ref 3.5–5.3)
POTASSIUM SERPL-SCNC: 4.4 MMOL/L — SIGNIFICANT CHANGE UP (ref 3.5–5.3)
RBC # BLD: 3.53 M/UL — LOW (ref 3.8–5.2)
RBC # FLD: 12.2 % — SIGNIFICANT CHANGE UP (ref 10.3–14.5)
SODIUM SERPL-SCNC: 143 MMOL/L — SIGNIFICANT CHANGE UP (ref 135–145)
TACROLIMUS SERPL-MCNC: 19 NG/ML — SIGNIFICANT CHANGE UP
WBC # BLD: 12.6 K/UL — HIGH (ref 3.8–10.5)
WBC # FLD AUTO: 12.6 K/UL — HIGH (ref 3.8–10.5)

## 2019-01-16 RX ORDER — VANCOMYCIN HCL 1 G
1000 VIAL (EA) INTRAVENOUS EVERY 24 HOURS
Qty: 0 | Refills: 0 | Status: DISCONTINUED | OUTPATIENT
Start: 2019-01-17 | End: 2019-01-17

## 2019-01-16 RX ORDER — VANCOMYCIN HCL 1 G
1000 VIAL (EA) INTRAVENOUS ONCE
Qty: 0 | Refills: 0 | Status: COMPLETED | OUTPATIENT
Start: 2019-01-16 | End: 2019-01-16

## 2019-01-16 RX ORDER — SODIUM CHLORIDE 9 MG/ML
1000 INJECTION, SOLUTION INTRAVENOUS
Qty: 0 | Refills: 0 | Status: DISCONTINUED | OUTPATIENT
Start: 2019-01-16 | End: 2019-01-17

## 2019-01-16 RX ORDER — INSULIN GLARGINE 100 [IU]/ML
40 INJECTION, SOLUTION SUBCUTANEOUS AT BEDTIME
Qty: 0 | Refills: 0 | Status: DISCONTINUED | OUTPATIENT
Start: 2019-01-16 | End: 2019-01-17

## 2019-01-16 RX ORDER — INSULIN LISPRO 100/ML
13 VIAL (ML) SUBCUTANEOUS
Qty: 0 | Refills: 0 | Status: DISCONTINUED | OUTPATIENT
Start: 2019-01-16 | End: 2019-01-17

## 2019-01-16 RX ORDER — VANCOMYCIN HCL 1 G
VIAL (EA) INTRAVENOUS
Qty: 0 | Refills: 0 | Status: DISCONTINUED | OUTPATIENT
Start: 2019-01-16 | End: 2019-01-17

## 2019-01-16 RX ORDER — HEPARIN SODIUM 5000 [USP'U]/ML
5000 INJECTION INTRAVENOUS; SUBCUTANEOUS EVERY 8 HOURS
Qty: 0 | Refills: 0 | Status: DISCONTINUED | OUTPATIENT
Start: 2019-01-16 | End: 2019-01-17

## 2019-01-16 RX ORDER — CIPROFLOXACIN LACTATE 400MG/40ML
500 VIAL (ML) INTRAVENOUS EVERY 24 HOURS
Qty: 0 | Refills: 0 | Status: DISCONTINUED | OUTPATIENT
Start: 2019-01-16 | End: 2019-01-17

## 2019-01-16 RX ORDER — INSULIN GLARGINE 100 [IU]/ML
37 INJECTION, SOLUTION SUBCUTANEOUS AT BEDTIME
Qty: 0 | Refills: 0 | Status: DISCONTINUED | OUTPATIENT
Start: 2019-01-16 | End: 2019-01-16

## 2019-01-16 RX ORDER — ACETAMINOPHEN 500 MG
650 TABLET ORAL EVERY 6 HOURS
Qty: 0 | Refills: 0 | Status: DISCONTINUED | OUTPATIENT
Start: 2019-01-16 | End: 2019-01-17

## 2019-01-16 RX ADMIN — Medication 5 MILLIGRAM(S): at 05:53

## 2019-01-16 RX ADMIN — SODIUM CHLORIDE 100 MILLILITER(S): 9 INJECTION, SOLUTION INTRAVENOUS at 11:40

## 2019-01-16 RX ADMIN — HEPARIN SODIUM 5000 UNIT(S): 5000 INJECTION INTRAVENOUS; SUBCUTANEOUS at 21:57

## 2019-01-16 RX ADMIN — AMPICILLIN SODIUM AND SULBACTAM SODIUM 100 GRAM(S): 250; 125 INJECTION, POWDER, FOR SUSPENSION INTRAMUSCULAR; INTRAVENOUS at 17:21

## 2019-01-16 RX ADMIN — Medication 250 MILLIGRAM(S): at 19:11

## 2019-01-16 RX ADMIN — TICAGRELOR 90 MILLIGRAM(S): 90 TABLET ORAL at 17:22

## 2019-01-16 RX ADMIN — Medication 81 MILLIGRAM(S): at 11:16

## 2019-01-16 RX ADMIN — TACROLIMUS 3 MILLIGRAM(S): 5 CAPSULE ORAL at 05:50

## 2019-01-16 RX ADMIN — SODIUM CHLORIDE 100 MILLILITER(S): 9 INJECTION, SOLUTION INTRAVENOUS at 21:59

## 2019-01-16 RX ADMIN — ATORVASTATIN CALCIUM 80 MILLIGRAM(S): 80 TABLET, FILM COATED ORAL at 21:57

## 2019-01-16 RX ADMIN — AMPICILLIN SODIUM AND SULBACTAM SODIUM 100 GRAM(S): 250; 125 INJECTION, POWDER, FOR SUSPENSION INTRAMUSCULAR; INTRAVENOUS at 11:15

## 2019-01-16 RX ADMIN — AMPICILLIN SODIUM AND SULBACTAM SODIUM 100 GRAM(S): 250; 125 INJECTION, POWDER, FOR SUSPENSION INTRAMUSCULAR; INTRAVENOUS at 00:30

## 2019-01-16 RX ADMIN — Medication 3: at 11:39

## 2019-01-16 RX ADMIN — Medication 500 MILLIGRAM(S): at 19:12

## 2019-01-16 RX ADMIN — INSULIN GLARGINE 40 UNIT(S): 100 INJECTION, SOLUTION SUBCUTANEOUS at 21:57

## 2019-01-16 RX ADMIN — Medication 3: at 17:22

## 2019-01-16 RX ADMIN — Medication 650 MILLIGRAM(S): at 11:53

## 2019-01-16 RX ADMIN — TICAGRELOR 90 MILLIGRAM(S): 90 TABLET ORAL at 05:50

## 2019-01-16 RX ADMIN — TACROLIMUS 3 MILLIGRAM(S): 5 CAPSULE ORAL at 17:22

## 2019-01-16 RX ADMIN — Medication 12 UNIT(S): at 08:53

## 2019-01-16 RX ADMIN — OXYCODONE AND ACETAMINOPHEN 1 TABLET(S): 5; 325 TABLET ORAL at 07:16

## 2019-01-16 RX ADMIN — Medication 3: at 08:53

## 2019-01-16 RX ADMIN — Medication 13 UNIT(S): at 11:38

## 2019-01-16 RX ADMIN — OXYCODONE AND ACETAMINOPHEN 1 TABLET(S): 5; 325 TABLET ORAL at 05:50

## 2019-01-16 RX ADMIN — Medication 13 UNIT(S): at 17:22

## 2019-01-16 RX ADMIN — Medication 650 MILLIGRAM(S): at 12:55

## 2019-01-16 RX ADMIN — AMPICILLIN SODIUM AND SULBACTAM SODIUM 100 GRAM(S): 250; 125 INJECTION, POWDER, FOR SUSPENSION INTRAMUSCULAR; INTRAVENOUS at 05:53

## 2019-01-16 NOTE — DISCHARGE NOTE ADULT - PATIENT PORTAL LINK FT
You can access the [facility], offered by [facility], by registering with the following website: [email or web address]

## 2019-01-16 NOTE — PROGRESS NOTE ADULT - PROBLEM SELECTOR PLAN 2
Asymptomatic, positive UA  - Urine cx corynebacterium, pansensitive  - C/w cipro and flagyl  - ID Dr Ovalles

## 2019-01-16 NOTE — ADVANCED PRACTICE NURSE CONSULT - ASSESSMENT
This is a 38yr old female patient admitted for Cellulitis, s/p I&D R. Back Abscess, to which the patient was being followed by Surgery.

## 2019-01-16 NOTE — MEDICAL STUDENT ADULT H&P (EDUCATION) - NS MD HP STUD MEDICATIONS FT
ampicillin/sulbactam  IVPB 1.5 Gram(s) IV Intermittent every 6 hours  aspirin enteric coated 81 milliGRAM(s) Oral daily  atorvastatin 80 milliGRAM(s) Oral at bedtime  heparin  Injectable 5000 Unit(s) SubCutaneous every 8 hours  insulin glargine Injectable (LANTUS) 37 Unit(s) SubCutaneous at bedtime  insulin lispro (HumaLOG) corrective regimen sliding scale   SubCutaneous three times a day before meals  insulin lispro Injectable (HumaLOG) 13 Unit(s) SubCutaneous three times a day with meals  metoprolol succinate ER 25 milliGRAM(s) Oral daily  NIFEdipine XL 60 milliGRAM(s) Oral daily  predniSONE   Tablet 5 milliGRAM(s) Oral daily  tacrolimus 3 milliGRAM(s) Oral every 12 hours  ticagrelor 90 milliGRAM(s) Oral two times a day

## 2019-01-16 NOTE — ADVANCED PRACTICE NURSE CONSULT - RECOMMEDATIONS
-Clean the R. Back wound with normal saline and apply skin prep to the surrounding skin  -Pack with Iodoform gauze and cover with a Foam dressing Daily PRN  -Encourage the patient to reposition Q 2hrs using wedges or pillows, while in bed.

## 2019-01-16 NOTE — DISCHARGE NOTE ADULT - ADDITIONAL INSTRUCTIONS
Continue your medications as prescribed.  Follow up with your primary doctor within one week.  Follow up with a nephrologist outpatient. Continue your medications as prescribed.  Follow up with your primary doctor within one week.  Follow up with a nephrologist outpatient.  Finish your ciprofloxacin for 4 more days and your kephlex for 10 days.  Wound care: Pt may shower prior to new gauze placement daily if desired and may let shower water wash wound prior to new gauze dressing applied. Clean the R. Back wound with normal saline and apply skin prep to the surrounding skin. Pack with Iodoform gauze and cover with a Foam dressing Daily PRN.

## 2019-01-16 NOTE — PROGRESS NOTE ADULT - SUBJECTIVE AND OBJECTIVE BOX
Interval Events: no events, pain from R shoulder cellulitis well tolerated, sugars better controlled    Allergies    No Known Allergies    Intolerances      Endocrine/Metabolic Medications:  atorvastatin 80 milliGRAM(s) Oral at bedtime  insulin glargine Injectable (LANTUS) 37 Unit(s) SubCutaneous at bedtime  insulin lispro (HumaLOG) corrective regimen sliding scale   SubCutaneous three times a day before meals  insulin lispro Injectable (HumaLOG) 13 Unit(s) SubCutaneous three times a day with meals  predniSONE   Tablet 5 milliGRAM(s) Oral daily      Vital Signs Last 24 Hrs  T(C): 36.4 (16 Jan 2019 07:45), Max: 37 (15 Mo 2019 23:45)  T(F): 97.5 (16 Jan 2019 07:45), Max: 98.6 (15 Mo 2019 23:45)  HR: 79 (16 Jan 2019 07:45) (79 - 88)  BP: 106/60 (16 Jan 2019 07:45) (105/64 - 112/67)  BP(mean): --  RR: 20 (16 Jan 2019 07:45) (16 - 20)  SpO2: 98% (16 Jan 2019 07:45) (98% - 100%)      PHYSICAL EXAM  All physical exam findings normal, except those marked:  General:	Alert, active, cooperative, NAD, well hydrated  .		[] Abnormal: obese  Neck		Normal: supple, no cervical adenopathy, no palpable thyroid  .		[] Abnormal:  Cardiovascular	Normal: regular rate, normal S1, S2, no murmurs  .		[] Abnormal:  Respiratory	Normal: no chest wall deformity, normal respiratory pattern, CTA B/L  .		[] Abnormal:  Abdominal	Normal: soft, ND, NT, bowel sounds present, no masses, no organomegaly  .		[] Abnormal:  		Normal normal genitalia, testes descended, circumcised/uncircumcised  .		Anastasia stage:			Breast anastasia:  .		Menstrual history:  .		[] Abnormal:  Extremities	Normal: FROM x4  .		[] Abnormal: R shoulder cellulitis  Skin		Normal: no acanthosis nigricans  .		[] Abnormal: R shoulder cellulitis  Neurologic	Normal: grossly intact  .		[] Abnormal:    LABS                        9.7    12.6  )-----------( 246      ( 16 Jan 2019 06:46 )             31.1                               143    |  113    |  19                  Calcium: 8.6   / iCa: x      (01-16 @ 06:46)    ----------------------------<  187       Magnesium: x                                4.4     |  22     |  2.10             Phosphorous: x          POCT Blood Glucose.: 262 mg/dL (16 Jan 2019 08:14)  POCT Blood Glucose.: 237 mg/dL (15 Mo 2019 21:12)  POCT Blood Glucose.: 154 mg/dL (15 Mo 2019 16:56)  POCT Blood Glucose.: 261 mg/dL (15 Mo 2019 11:46)

## 2019-01-16 NOTE — PROGRESS NOTE ADULT - SUBJECTIVE AND OBJECTIVE BOX
s/p I&D of right back mass POD#2    right upper back/shoulder wound: base with granulation tissue, no purulence no bleed, some necrotic tissue at wound edges. no surrounding erythema or crepitus.                             9.7    12.6  )-----------( 246      ( 16 Jan 2019 06:46 )             31.1   01-16    143  |  113<H>  |  19<H>  ----------------------------<  187<H>  4.4   |  22  |  2.10<H>    Ca    8.6      16 Jan 2019 06:46

## 2019-01-16 NOTE — PROGRESS NOTE ADULT - SUBJECTIVE AND OBJECTIVE BOX
PGY1 Note discussed with Supervising Resident and Primary Attending.    Patient is a 38y old  Female who presents with a chief complaint of fever and right shoulder cellulitis (16 Jan 2019 09:56)      INTERVAL HPI/OVERNIGHT EVENTS:  No acute overnight events. Patient seen and examined at bedside. She reports that her pain is improving though still there. Patient denies nausea, vomiting, diarrhea, chest pain, SOB, headache.    MEDICATIONS  (STANDING):  ampicillin/sulbactam  IVPB 1.5 Gram(s) IV Intermittent every 6 hours  aspirin enteric coated 81 milliGRAM(s) Oral daily  atorvastatin 80 milliGRAM(s) Oral at bedtime  heparin  Injectable 5000 Unit(s) SubCutaneous every 8 hours  insulin glargine Injectable (LANTUS) 37 Unit(s) SubCutaneous at bedtime  insulin lispro (HumaLOG) corrective regimen sliding scale   SubCutaneous three times a day before meals  insulin lispro Injectable (HumaLOG) 13 Unit(s) SubCutaneous three times a day with meals  metoprolol succinate ER 25 milliGRAM(s) Oral daily  NIFEdipine XL 60 milliGRAM(s) Oral daily  predniSONE   Tablet 5 milliGRAM(s) Oral daily  tacrolimus 3 milliGRAM(s) Oral every 12 hours  ticagrelor 90 milliGRAM(s) Oral two times a day    MEDICATIONS  (PRN):  oxyCODONE    5 mG/acetaminophen 325 mG 1 Tablet(s) Oral every 4 hours PRN Moderate Pain (4 - 6)      Allergies    No Known Allergies    Intolerances        REVIEW OF SYSTEMS:  * General: denies chills, fatigue  * Eyes: denies vision changes  * Respiratory: denies cough, SOB, wheezing  * Cardio: denies chest pain, palpitations  * GI: denies abd pain, nausea, vomiting, diarrhea  * : denies dysuria  * Neuro: denies headaches, numbness, weakness  * MSK: denies joint pain  * Skin: denies itching, rashes; reports mild pain over right shoulder/back where abscess had I&D    Vital Signs Last 24 Hrs  T(C): 36.4 (16 Jan 2019 07:45), Max: 37 (15 Mo 2019 23:45)  T(F): 97.5 (16 Jan 2019 07:45), Max: 98.6 (15 Mo 2019 23:45)  HR: 79 (16 Jan 2019 07:45) (79 - 88)  BP: 106/60 (16 Jan 2019 07:45) (105/64 - 112/67)  BP(mean): --  RR: 20 (16 Jan 2019 07:45) (16 - 20)  SpO2: 98% (16 Jan 2019 07:45) (98% - 100%)    PHYSICAL EXAM :  * General: no acute distress, well-nourished, well-developed  * HEENT: atraumatic, normocephalic; moist mucus membranes; supple neck; no JVD  * CN: EOMI, PERRL  * Respiratory: cta b/l; no wheezes, rales, rhonchi  * Cardio: RRR; no appreciable murmurs, rubs, gallops  * Abd: soft, nontender, nondistended, +BS  * Neuro: AAOx3; strength 5/5; sensation intact throughout  * Extremities: no clubbing, cyanosis, edema  * Skin: no rashes    LABS:                          9.7    12.6  )-----------( 246      ( 16 Jan 2019 06:46 )             31.1     01-16    143  |  113<H>  |  19<H>  ----------------------------<  187<H>  4.4   |  22  |  2.10<H>    Ca    8.6      16 Jan 2019 06:46          CAPILLARY BLOOD GLUCOSE      POCT Blood Glucose.: 262 mg/dL (16 Jan 2019 08:14)  POCT Blood Glucose.: 237 mg/dL (15 Mo 2019 21:12)  POCT Blood Glucose.: 154 mg/dL (15 Mo 2019 16:56)  POCT Blood Glucose.: 261 mg/dL (15 Mo 2019 11:46)      RADIOLOGY & ADDITIONAL TESTS:   no new imaging    Consultant(s) Notes Reviewed: yes

## 2019-01-16 NOTE — PROGRESS NOTE ADULT - PROBLEM SELECTOR PLAN 1
Presented with right shoulder erythema, swelling and pain started 2 days ago   - S/p clinda in ED   - S/p 1 dose of vanco and zosyn - given worsening renal function in setting of renal transplant  - C/w unasyn  - Blood cx ngtd  - POD2 s/p I&D  - F/u surgical cx to completion - few staph aureus  - ID Dr Seot

## 2019-01-16 NOTE — PROGRESS NOTE ADULT - SUBJECTIVE AND OBJECTIVE BOX
Patient is a 38y old  Female who presents with a chief complaint of fever and right shoulder cellulitis (16 Jan 2019 10:20)    pt seen in icu [  ], reg med floor [ x  ], bed [ x ], chair at bedside [   ], a+o x3 [ x ], lethargic [  ],  nad [ x ]      Allergies    No Known Allergies        Vitals    T(F): 97.5 (01-16-19 @ 07:45), Max: 98.6 (01-15-19 @ 23:45)  HR: 79 (01-16-19 @ 07:45) (79 - 88)  BP: 106/60 (01-16-19 @ 07:45) (105/64 - 112/67)  RR: 20 (01-16-19 @ 07:45) (16 - 20)  SpO2: 98% (01-16-19 @ 07:45) (98% - 100%)  Wt(kg): --  CAPILLARY BLOOD GLUCOSE      POCT Blood Glucose.: 262 mg/dL (16 Jan 2019 08:14)      Labs                          9.7    12.6  )-----------( 246      ( 16 Jan 2019 06:46 )             31.1       01-16    143  |  113<H>  |  19<H>  ----------------------------<  187<H>  4.4   |  22  |  2.10<H>    Ca    8.6      16 Jan 2019 06:46              .Surgical Swab right shoulder abscess  01-15 @ 10:53   Numerous Staphylococcus aureus  --  --      .Blood Blood  01-13 @ 22:56   No growth to date.  --  Klebsiella pneumoniae  Culture - Urine (01.13.19 @ 22:56)    -  Gentamicin: S <=1    -  Imipenem: S <=1    -  Levofloxacin: S <=1    -  Meropenem: S <=1    -  Nitrofurantoin: S <=32 Should not be used to treat pyelonephritis    -  Piperacillin/Tazobactam: S <=8    -  Tigecycline: S <=1    -  Tobramycin: S <=2    -  Trimethoprim/Sulfamethoxazole: R >2/38    -  Amikacin: S <=8    -  Amoxicillin/Clavulanic Acid: S <=8/4    -  Ampicillin: R >16 These ampicillin results predict results for amoxicillin    -  Ampicillin/Sulbactam: I 16/8    -  Aztreonam: S <=4    -  Cefazolin: I 4 For uncomplicated UTI with K. pneumoniae, E. coli, or P. mirablis: JACINDA <=16 is sensitive and JACINDA >=32 is resistant. This also predicts results for oral agents cefaclor, cefdinir, cefpodoxime, cefprozil, cefuroxime axetil, cephalexin and locarbef for uncomplicated UTI. Note that some isolates may be susceptible to these agents while testing resistant to cefazolin.    -  Cefepime: S <=2    -  Cefoxitin: S <=4    -  Ceftriaxone: S <=1 Enterobacter, Citrobacter, and Serratia may develop resistance during prolonged therapy    -  Ciprofloxacin: S <=0.5    -  Ertapenem: S <=0.5    Specimen Source: .Urine Clean Catch (Midstream)    Culture Results:   >100,000 CFU/ml Klebsiella pneumoniae    Organism Identification: Klebsiella pneumoniae    Organism: Klebsiella pneumoniae    Method Type: JACINDA            Radiology Results      Meds    MEDICATIONS  (STANDING):  ampicillin/sulbactam  IVPB 1.5 Gram(s) IV Intermittent every 6 hours  aspirin enteric coated 81 milliGRAM(s) Oral daily  atorvastatin 80 milliGRAM(s) Oral at bedtime  heparin  Injectable 5000 Unit(s) SubCutaneous every 8 hours  insulin glargine Injectable (LANTUS) 37 Unit(s) SubCutaneous at bedtime  insulin lispro (HumaLOG) corrective regimen sliding scale   SubCutaneous three times a day before meals  insulin lispro Injectable (HumaLOG) 13 Unit(s) SubCutaneous three times a day with meals  metoprolol succinate ER 25 milliGRAM(s) Oral daily  NIFEdipine XL 60 milliGRAM(s) Oral daily  predniSONE   Tablet 5 milliGRAM(s) Oral daily  tacrolimus 3 milliGRAM(s) Oral every 12 hours  ticagrelor 90 milliGRAM(s) Oral two times a day      MEDICATIONS  (PRN):  oxyCODONE    5 mG/acetaminophen 325 mG 1 Tablet(s) Oral every 4 hours PRN Moderate Pain (4 - 6)      Physical Exam      Neuro :  no focal deficits  Respiratory: CTA B/L  CV: RRR, S1S2, no murmurs,   Abdominal: Soft, NT, ND +BS,  Extremities: No edema, + peripheral pulses, right suprascapular wound with packing cdi    ASSESSMENT    right shoulder Cellulitis with abcess  uti  acute on ckd  sob  uncontrolled dm  h/o CAD (coronary artery disease)  s/p stent LAD Dec, 2018 (on aspirin and Brilinta- [facility])  Kidney transplant recipient  DM (diabetes mellitus)        PLAN      cont unasyn  id f/u  blood cx neg  ucx and sens with klebsiella noted above  surgery f/u noted  s/p i&d  i&d cx with staph aureus noted above  f/u sens  cont local wound care wet to dry dressings.   Pt may shower prior to new gauze placement daily if desired and may let shower water wash wound prior to new gauze dressing applied  cont iv fluids  renal f/u  sob possible 2nd to anxiety  pulm f/u  cxr with clear lungs noted above  endo f/u noted  cont lantus 34 units qhs and 12 units humalog actid  hss  cont current meds

## 2019-01-16 NOTE — DISCHARGE NOTE ADULT - SECONDARY DIAGNOSIS.
MICHAEL (acute kidney injury) DM (diabetes mellitus) CAD (coronary artery disease) UTI (urinary tract infection)

## 2019-01-16 NOTE — MEDICAL STUDENT ADULT H&P (EDUCATION) - NS MD HP STUD RESULTS LAB FT
POCT Blood Glucose.: 262 mg/dL (16 Jan 2019 08:14)      Labs                          9.7    12.6  )-----------( 246      ( 16 Jan 2019 06:46 )             31.1       143  |  113<H>  |  19<H>  ----------------------------<  187<H>  4.4   |  22  |  2.10<H>    Ca    8.6      16 Jan 2019 06:46    Surgical Swab right shoulder abscess  01-15 @ 10:53   Numerous Staphylococcus aureus (prelim)

## 2019-01-16 NOTE — MEDICAL STUDENT ADULT H&P (EDUCATION) - NS MD HP STUD HX OF PRESENT ILLNESS FT
Patient was seen and examined at bedside. The patient states that she continues to have mild pain over the right shoulder, especially to touch. This pain is under good control with oxycodone. Patient denies nausea, vomiting, fever, chills, difficultly with urination, changes in stool, muscle pain, or headaches.

## 2019-01-16 NOTE — PROGRESS NOTE ADULT - PROBLEM SELECTOR PLAN 3
SOB for about last month for which she was following up with her PCP and was told that it is 2/2 anxiety  - Resolved, most likely from pain  - Ddimer 516  - T1 and T2 negative with recent cath - on aspirin and brilinta  - Echo - EF 55% with trace MR

## 2019-01-16 NOTE — PROGRESS NOTE ADULT - PROBLEM SELECTOR PLAN 1
uncontrolled DM  HbA1C 10.9  home meds: levemir 30 units and 12 premeal novolog TID  c/w 34 units lantus hs and 12 units humalog  no oral DM meds 2/2 CKD and renal transplant  monitor FS ac hs  nutrition eval uncontrolled DM  HbA1C 10.9  home meds: levemir 30 units and 12 premeal novolog TID  increased to 40units lantus hs and c/w 12 units humalog  no oral DM meds 2/2 CKD and renal transplant  monitor FS ac hs  nutrition eval

## 2019-01-16 NOTE — PROGRESS NOTE ADULT - SUBJECTIVE AND OBJECTIVE BOX
Problem List:  Living donor transplant in 1999.  Base line craetinine 1.4  MICHAEL associated with sepsis SIR  Skin abscess was drained and UTI.  Hyperglycemia and dehydration.          PAST MEDICAL & SURGICAL HISTORY:  CAD (coronary artery disease)  Kidney transplant recipient  DM (diabetes mellitus)        No Known Allergies      MEDICATIONS  (STANDING):  ampicillin/sulbactam  IVPB 1.5 Gram(s) IV Intermittent every 6 hours  aspirin enteric coated 81 milliGRAM(s) Oral daily  atorvastatin 80 milliGRAM(s) Oral at bedtime  heparin  Injectable 5000 Unit(s) SubCutaneous every 8 hours  insulin glargine Injectable (LANTUS) 37 Unit(s) SubCutaneous at bedtime  insulin lispro (HumaLOG) corrective regimen sliding scale   SubCutaneous three times a day before meals  insulin lispro Injectable (HumaLOG) 13 Unit(s) SubCutaneous three times a day with meals  metoprolol succinate ER 25 milliGRAM(s) Oral daily  NIFEdipine XL 60 milliGRAM(s) Oral daily  predniSONE   Tablet 5 milliGRAM(s) Oral daily  tacrolimus 3 milliGRAM(s) Oral every 12 hours  ticagrelor 90 milliGRAM(s) Oral two times a day    MEDICATIONS  (PRN):  oxyCODONE    5 mG/acetaminophen 325 mG 1 Tablet(s) Oral every 4 hours PRN Moderate Pain (4 - 6)    Culture - Surgical Swab (01.15.19 @ 10:53)    Specimen Source: .Surgical Swab right shoulder abscess    Culture Results:   Numerous Staphylococcus aureus    Culture - Urine (01.13.19 @ 22:56)    -  Gentamicin: S <=1    -  Imipenem: S <=1    -  Levofloxacin: S <=1    -  Meropenem: S <=1    -  Nitrofurantoin: S <=32 Should not be used to treat pyelonephritis    -  Piperacillin/Tazobactam: S <=8    -  Tigecycline: S <=1    -  Tobramycin: S <=2    -  Trimethoprim/Sulfamethoxazole: R >2/38    -  Amikacin: S <=8    -  Amoxicillin/Clavulanic Acid: S <=8/4    -  Ampicillin: R >16 These ampicillin results predict results for amoxicillin    -  Ampicillin/Sulbactam: I 16/8    -  Aztreonam: S <=4    -  Cefazolin: I 4 For uncomplicated UTI with K. pneumoniae, E. coli, or P. mirablis: JACINDA <=16 is sensitive and JACINDA >=32 is resistant. This also predicts results for oral agents cefaclor, cefdinir, cefpodoxime, cefprozil, cefuroxime axetil, cephalexin and locarbef for uncomplicated UTI. Note that some isolates may be susceptible to these agents while testing resistant to cefazolin.    -  Cefepime: S <=2    -  Cefoxitin: S <=4    -  Ceftriaxone: S <=1 Enterobacter, Citrobacter, and Serratia may develop resistance during prolonged therapy    -  Ciprofloxacin: S <=0.5    -  Ertapenem: S <=0.5    Specimen Source: .Urine Clean Catch (Midstream)    Culture Results:   >100,000 CFU/ml Klebsiella pneumoniae    Organism Identification: Klebsiella pneumoniae    Organism: Klebsiella pneumoniae    Method Type: JACINDA                            9.7    12.6  )-----------( 246      ( 16 Jan 2019 06:46 )             31.1     01-16    143  |  113<H>  |  19<H>  ----------------------------<  187<H>  4.4   |  22  |  2.10<H>    Ca    8.6      16 Jan 2019 06:46      Basic Metabolic Panel in AM (01.15.19 @ 05:52)    Sodium, Serum: 141 mmol/L    Potassium, Serum: 4.7: Results verified. mmol/L    Chloride, Serum: 111 mmol/L    Carbon Dioxide, Serum: 24 mmol/L    Anion Gap, Serum: 6 mmol/L    Blood Urea Nitrogen, Serum: 16 mg/dL    Creatinine, Serum: 2.03 mg/dL    Glucose, Serum: 199 mg/dL    Calcium, Total Serum: 8.1 mg/dL    eGFR if Non : 31: Interpretative comment      REVIEW OF SYSTEMS:  General: no fever no chills, no weight loss.  EYES/ENT: No visual changes;  No vertigo, no headache.  NECK: No pain or stiffness  RESPIRATORY: No cough, wheezing, hemoptysis; No shortness of breath  CARDIOVASCULAR: No chest pain or palpitations. No Edema  GASTROINTESTINAL: No abdominal or epigastric pain. No nausea, vomiting. No diarrhea or constipation. No melena.  GENITOURINARY: No dysuria, frequency, foamy urine, urinary urgency, incontinence or hematuria  NEUROLOGICAL: No numbness or weakness, no tremor , no dizziness.   Muscle skeletal : no joint pain and no swelling of joints and limbs.  SKIN: No itching, burning, rashes.        VITALS:  T(F): 97.5 (01-16-19 @ 07:45), Max: 98.6 (01-15-19 @ 23:45)  HR: 79 (01-16-19 @ 07:45)  BP: 106/60 (01-16-19 @ 07:45)  RR: 20 (01-16-19 @ 07:45)  SpO2: 98% (01-16-19 @ 07:45)  Wt(kg): --      PHYSICAL EXAM:  Constitutional: well developed, no diaphoresis, no distress.  Neck: No JVD, no carotid bruit, supple, no adenopathy  Respiratory: Good air entrance B/L, no wheezes, rales or rhonchi  Cardiovascular: S1, S2, RRR, no pericardial rub, no murmur  Abdomen: BS+, soft, no tenderness, no bruit  Pelvis: bladder nondistended  Extremities: No cyanosis or clubbing. No peripheral edema.

## 2019-01-16 NOTE — DISCHARGE NOTE ADULT - CARE PROVIDER_API CALL
Jo Ann Galarza), Internal Medicine; Nephrology  [address] [place] [address]  Phone: [phone]  Fax: [phone]

## 2019-01-16 NOTE — DISCHARGE NOTE ADULT - HOSPITAL COURSE
HPI: 38/F with PMH of renal transplant in 1999, DM, CAD s/p stent LAD Dec, 2018 (on aspirin and Brilinta- [place]) came after fever and cellulitis of right shoulder started 3 days ago. Patient mentions that she was having SOB for about last month for which she was following up with her PCP and was told that it is 2/2 anxiety. About 2-3 days ago she started having subjective fever and generalized weakness about day later her son noted small pimple on her back which got worse to diffuse cellulitis around right scapular region over next 12 hours. She noted that area was red, and warm. Also had fever of 102.5 with chills for which she took tylenol and rested but next day she continued to have episodes of fever which brought her to hospital. Denies nausea, vomiting, diarrhea, abdominal pain, chest pain, palpitations, dizziness, headache, cough, wheezing, joint pain or swelling. (13 Jan 2019 22:55)    Hospital Course: UA was weakly positive, received 3 day course of rocephin. Surgery diagnosed the patient with an abscess, went for I&D. Blood cultures were negative. Surgical cultures grew staph. The patient will be discharged on abx. Advised patient to continue abx and follow up with PCP within a week. HPI: 38/F with PMH of renal transplant in 1999, DM, CAD s/p stent LAD Dec, 2018 (on aspirin and Brilinta- [place]) came after fever and cellulitis of right shoulder started 3 days ago. Patient mentions that she was having SOB for about last month for which she was following up with her PCP and was told that it is 2/2 anxiety. About 2-3 days ago she started having subjective fever and generalized weakness about day later her son noted small pimple on her back which got worse to diffuse cellulitis around right scapular region over next 12 hours. She noted that area was red, and warm. Also had fever of 102.5 with chills for which she took tylenol and rested but next day she continued to have episodes of fever which brought her to hospital. Denies nausea, vomiting, diarrhea, abdominal pain, chest pain, palpitations, dizziness, headache, cough, wheezing, joint pain or swelling. (13 Jan 2019 22:55)    Hospital Course: UA was weakly positive, received 3 day course of rocephin. Surgery diagnosed the patient with an abscess, went for I&D. Blood cultures were negative. Surgical cultures grew staph. The patient will be discharged on cipro 4 more days and kephlex 10 days. Advised patient to continue abx and follow up with PCP within a week. Plan d/w Dr Mo and Dr Baldo dash. Medically stable for dc home. Please see chart for full hospital course as this is just a brief summary. HPI: 38/F with PMH of renal transplant in 1999, DM, CAD s/p stent LAD Dec, 2018 (on aspirin and Brilinta- [place]) came after fever and cellulitis of right shoulder started 3 days ago. Patient mentions that she was having SOB for about last month for which she was following up with her PCP and was told that it is 2/2 anxiety. About 2-3 days ago she started having subjective fever and generalized weakness about day later her son noted small pimple on her back which got worse to diffuse cellulitis around right scapular region over next 12 hours. She noted that area was red, and warm. Also had fever of 102.5 with chills for which she took tylenol and rested but next day she continued to have episodes of fever which brought her to hospital. Denies nausea, vomiting, diarrhea, abdominal pain, chest pain, palpitations, dizziness, headache, cough, wheezing, joint pain or swelling. (13 Jan 2019 22:55)    Hospital Course: UA was weakly positive, received 3 day course of rocephin. Surgery diagnosed the patient with sepsis 2nd to shoulder abscess, went for I&D. Blood cultures were negative. Surgical cultures grew staph. The patient will be discharged on cipro 4 more days and kephlex 10 days. Advised patient to continue abx and follow up with PCP within a week. Plan d/w Dr Mo and Dr Baldo dash. Medically stable for dc home. Please see chart for full hospital course as this is just a brief summary.

## 2019-01-16 NOTE — DISCHARGE NOTE ADULT - PLAN OF CARE
Surgery and Antibiotics You presented to the hospital with redness and warmth on your right shoulder and were found to have an abscess. You were started on IV antibiotics and you were for a surgical incision and drainage. Your cultures grew bacteria. Your blood did not grow any bacteria. You received antibiotics based on the bacteria in your abscess. Finish your course of antibiotics and follow up with your primary doctor within one week. You presented to the hospital with worsened kidney function. Since you have a history of renal transplant, it is important for you to follow with a nephrologist outpatient. Your kidney function has improved. Please follow up with a nephrologist outpatient. You have a history of diabetes. Your A1C is 10.9. Please continue taking your medication as prescribed. Eat a healthy diet low in sugar and fat and try to exercise as tolerated. Closely monitor your blood sugars. Follow up with your primary doctor again in 3 months for your A1C to be rechecked. You have a history of coronary artery disease with a stent. Continue your medications as prescribed. Eat a healthy diet low in fats and sodium, and try to exercise as tolerated. You were found to have a UTI and were treated with antibiotics. You presented to the hospital with worsened kidney function. Since you have a history of renal transplant, it is important for you to follow with a nephrologist outpatient. Your kidney function has improved. Please follow up with a nephrologist outpatient. You may follow up with Dr Galarza who saw you in the hospital. You were found to have a UTI and were treated with antibiotics. Please finish your course of antibiotics.

## 2019-01-16 NOTE — PROGRESS NOTE ADULT - PROBLEM SELECTOR PLAN 5
BUN/Cr 19/2.33 (baseline 14/1.18)  - Pre-renal vs worsening renal function given transplant status  - Called [facility] renal transplant center [phone], spoke with coordinator who is having the patient's nurse call me back. As per coordinator, the patient no longer follows at [facility].  - Nephro Dr Galarza

## 2019-01-16 NOTE — DISCHARGE NOTE ADULT - CARE PLAN
Principal Discharge DX:	Abscess  Goal:	Surgery and Antibiotics  Assessment and plan of treatment:	You presented to the hospital with redness and warmth on your right shoulder and were found to have an abscess. You were started on IV antibiotics and you were for a surgical incision and drainage. Your cultures grew bacteria. Your blood did not grow any bacteria. You received antibiotics based on the bacteria in your abscess. Finish your course of antibiotics and follow up with your primary doctor within one week.  Secondary Diagnosis:	MICHAEL (acute kidney injury)  Secondary Diagnosis:	DM (diabetes mellitus)  Secondary Diagnosis:	CAD (coronary artery disease)  Secondary Diagnosis:	UTI (urinary tract infection) Principal Discharge DX:	Abscess  Goal:	Surgery and Antibiotics  Assessment and plan of treatment:	You presented to the hospital with redness and warmth on your right shoulder and were found to have an abscess. You were started on IV antibiotics and you were for a surgical incision and drainage. Your cultures grew bacteria. Your blood did not grow any bacteria. You received antibiotics based on the bacteria in your abscess. Finish your course of antibiotics and follow up with your primary doctor within one week.  Secondary Diagnosis:	MICHAEL (acute kidney injury)  Assessment and plan of treatment:	You presented to the hospital with worsened kidney function. Since you have a history of renal transplant, it is important for you to follow with a nephrologist outpatient. Your kidney function has improved. Please follow up with a nephrologist outpatient.  Secondary Diagnosis:	DM (diabetes mellitus)  Assessment and plan of treatment:	You have a history of diabetes. Your A1C is 10.9. Please continue taking your medication as prescribed. Eat a healthy diet low in sugar and fat and try to exercise as tolerated. Closely monitor your blood sugars. Follow up with your primary doctor again in 3 months for your A1C to be rechecked.  Secondary Diagnosis:	CAD (coronary artery disease)  Assessment and plan of treatment:	You have a history of coronary artery disease with a stent. Continue your medications as prescribed. Eat a healthy diet low in fats and sodium, and try to exercise as tolerated.  Secondary Diagnosis:	UTI (urinary tract infection)  Assessment and plan of treatment:	You were found to have a UTI and were treated with antibiotics. Principal Discharge DX:	Abscess  Goal:	Surgery and Antibiotics  Assessment and plan of treatment:	You presented to the hospital with redness and warmth on your right shoulder and were found to have an abscess. You were started on IV antibiotics and you were for a surgical incision and drainage. Your cultures grew bacteria. Your blood did not grow any bacteria. You received antibiotics based on the bacteria in your abscess. Finish your course of antibiotics and follow up with your primary doctor within one week.  Secondary Diagnosis:	MICHAEL (acute kidney injury)  Assessment and plan of treatment:	You presented to the hospital with worsened kidney function. Since you have a history of renal transplant, it is important for you to follow with a nephrologist outpatient. Your kidney function has improved. Please follow up with a nephrologist outpatient. You may follow up with Dr Galarza who saw you in the hospital.  Secondary Diagnosis:	DM (diabetes mellitus)  Assessment and plan of treatment:	You have a history of diabetes. Your A1C is 10.9. Please continue taking your medication as prescribed. Eat a healthy diet low in sugar and fat and try to exercise as tolerated. Closely monitor your blood sugars. Follow up with your primary doctor again in 3 months for your A1C to be rechecked.  Secondary Diagnosis:	CAD (coronary artery disease)  Assessment and plan of treatment:	You have a history of coronary artery disease with a stent. Continue your medications as prescribed. Eat a healthy diet low in fats and sodium, and try to exercise as tolerated.  Secondary Diagnosis:	UTI (urinary tract infection)  Assessment and plan of treatment:	You were found to have a UTI and were treated with antibiotics. Please finish your course of antibiotics.

## 2019-01-16 NOTE — PROGRESS NOTE ADULT - SUBJECTIVE AND OBJECTIVE BOX
Meds:  ampicillin/sulbactam  IVPB 1.5 Gram(s) IV Intermittent every 6 hours    Allergies:  Allergies    No Known Allergies    Intolerances      ROS  [  ] UNABLE TO ELICIT    General:  [  ] None  [  ] Fever  [  ] Chills  [ x ] Malaise    Skin:  [  ] None [  ] Rash  [  ] Wound  [ x ] back abscess.    HEENT:  [ x ] None  [  ] Sore Throat  [  ] Nasal congestion/ runny nose  [  ] Photophobia [  ] Neck pain      Chest:  [ x ] None   [  ] SOB  [  ] Cough  [  ] None    Cardiovascular:   [ x ] None  [  ] CP  [  ] Palpitation    Gastrointestinal:  [ x ] None  [  ] Abd pain   [  ] Nausea    [  ] Vomiting   [  ] Diarrhea	     Genitourinary:  [ x ] None [  ] Polyuria   [  ] Urgency  [  ] Frequency  [  ] Dysuria    [  ]  Hematuria       Musculoskeletal:  [  ] None [  ] Back Pain	[ x ] Body aches  [  ] Joint pain    Neurological: [  ] None [  ]Dizziness  [  ]Visual Disturbance  [  ]Headaches   [ x ] Weakness        PHYSICAL EXAM:  Vital Signs Last 24 Hrs  T(C): 36.6 (16 Jan 2019 15:59), Max: 37 (15 Mo 2019 23:45)  T(F): 97.9 (16 Jan 2019 15:59), Max: 98.6 (15 Mo 2019 23:45)  HR: 69 (16 Jan 2019 15:59) (69 - 88)  BP: 129/87 (16 Jan 2019 15:59) (106/60 - 129/87)  BP(mean): --  RR: 20 (16 Jan 2019 15:59) (16 - 20)  SpO2: 100% (16 Jan 2019 15:59) (98% - 100%)    Constitutional:    HEENT: [ x ] Wnl  [  ] Pharyngeal congestion    Neck:  [ x ] Supple  [  ]Lymphadenopathy  [  ] No JVD   [  ] JVD  [  ] Masses   [  ] WNL    CHEST/Respiratory:  [ x ]Clear to auscultation  [  ] Rales   [  ] Rhonchi   [  ] Wheezing     [  ] Chest Tenderness      Cardiovascular:  [ x ] Reg S1 S2   [  ] Irreg S1 S2   [ x ]No Murmur  [  ] +ve Murmurs  [  ]Systolic [  ]Diastolic      Abdomen:  [ x ] Soft  [ x ] No tendrerness  [  ] Tenderness  [  ] Organomegaly  [  ] ABD Distention  [  ] Rigidity                       [ x ] No Regidity                       [ x ] No Rebound Tenderness  [  ] No Guarding Rigidity  [  ] Rebound Tenderness[  ] Guarding Rigidity                          [ x ]  +ve Bowel Sounds  [  ] Decreased Bowel Sounds    [  ] Absent Bowel Sounds                            Extremities: [ x ] No edema [  ] Edema  [  ] Clubbing   [  ] Cyanosis                         [ x ] No Tender Calf muscles  [  ] Tender Calf muscles                        [ x ] Palpable peripheral pulses    Neurological: [ x ] Awake  [ x ] Alert  [ x ] Oriented  x  3                           [  ] Confused  [  ] Drowsy  [  ] respond to painful stimuli  [  ] Unresponsive    Skin:  [  ] Intact [  ] Redness [  ] Thrombophlebitis  [  ] Rashes  [  ] Dry  [ x ] abscess surgical dressing on the right upper back with redness, induration and tenderness are improving    Ortho:  [  ] Joint Swelling  [  ] Joint erythema [  ] Joint tenderness                [  ] Increased temp. to touch  [  ] DJD [ x ] WNL        LABS/DIAGNOSTIC TESTS                        9.7    12.6  )-----------( 246      ( 16 Jan 2019 06:46 )             31.1   01-16    143  |  113<H>  |  19<H>  ----------------------------<  187<H>  4.4   |  22  |  2.10<H>    Ca    8.6      16 Jan 2019 06:46          CULTURES:   Culture - Surgical Swab (01.15.19 @ 10:53)    Specimen Source: .Surgical Swab right shoulder abscess    Culture Results:   Numerous Staphylococcus aureus    Culture - Urine (01.13.19 @ 22:56)    -  Gentamicin: S <=1    -  Imipenem: S <=1    -  Levofloxacin: S <=1    -  Meropenem: S <=1    -  Nitrofurantoin: S <=32 Should not be used to treat pyelonephritis    -  Piperacillin/Tazobactam: S <=8    -  Tigecycline: S <=1    -  Tobramycin: S <=2    -  Trimethoprim/Sulfamethoxazole: R >2/38    -  Amikacin: S <=8    -  Amoxicillin/Clavulanic Acid: S <=8/4    -  Ampicillin: R >16 These ampicillin results predict results for amoxicillin    -  Ampicillin/Sulbactam: I 16/8    -  Aztreonam: S <=4    -  Cefazolin: I 4 For uncomplicated UTI with K. pneumoniae, E. coli, or P. mirablis: JACINDA <=16 is sensitive and JACINDA >=32 is resistant. This also predicts results for oral agents cefaclor, cefdinir, cefpodoxime, cefprozil, cefuroxime axetil, cephalexin and locarbef for uncomplicated UTI. Note that some isolates may be susceptible to these agents while testing resistant to cefazolin.    -  Cefepime: S <=2    -  Cefoxitin: S <=4    -  Ceftriaxone: S <=1 Enterobacter, Citrobacter, and Serratia may develop resistance during prolonged therapy    -  Ciprofloxacin: S <=0.5    -  Ertapenem: S <=0.5    Specimen Source: .Urine Clean Catch (Midstream)       Culture - Blood (01.13.19 @ 22:56)    Specimen Source: .Blood Blood    Culture Results:   No growth to date.    Culture - Blood (01.13.19 @ 22:56)    Specimen Source: .Blood Blood    Culture Results:   No growth to date.        RADIOLOGY    CXR:    EXAM:  XR CHEST PA LAT 2V                            PROCEDURE DATE:  01/13/2019          INTERPRETATION:  Clinical Information: Dizziness    Technique: AP chest image.     Comparison: 04/25/2018    Findings: The heart is unremarkable. The lungs are clear. Bones are   unremarkable for age.    Impression: Clear lungs.        Assessment and Recommendation:  38/F with PMH of renal transplant in 1999, DM, CAD s/p stent LAD Dec, 2018 (on aspirin and Brilinta- [place]) came after fever and cellulitis of right shoulder started 3 days ago. Patient mentions that she was having SOB for about last month for which she was following up with her PCP and was told that it is 2/2 anxiety. About 2-3 days ago she started having subjective fever and generalized weakness about day later her son noted small pimple on her back which got worse to diffuse cellulitis around right scapular region over next 12 hours. She noted that area was red, and warm. Also had fever of 102.5 with chills for which she took tylenol and rested but next day she continued to have episodes of fever which brought her to hospital.  Patient was started on IV Unasyn and was given Vancomycin in the ED.  1/14/19 Patient had surgery done, and 1/15/19 Patient feels better and WBC is better.  1/16/19 ABX was changed to Cipro and Vancomycin as tissue culture grew staph aureus pending sensitivity and urine grew E. coli not sensitive to Unasyn.    Problem/Recommendation - 1:  Problem: Abscess. and UTI.  Recommendation:   1- Follow +ve tissue culture to final report.  2- Follow Blood culture to final report are still -VE.  3- Vancomycin 1 gm IVPB q day and follow trough.  4- Change IV Unasyn to PO Cipro for  mg q day.  5- Fluid and electrolytes management.  6- CBC and BMP follow up.   7- Back wound care as per surgery.    Problem/Recommendation - 2:  ·  Problem: DM (diabetes mellitus).    Recommendation:   1- Blood sugar monitoring and control.  2- Accu-Cheks with coverage.  3- 1800 jack ADA diet.  4- Follow HB A1C.     Problem/Recommendation - 3:  ·  Problem: Kidney transplant recipient.    Recommendation:   1- Continue renal transplant meds.  2- Nephrology management and follow up.     Discussed with medical resident. Meds:  Vancomycin 1 gm IVPB q day.  Cipro 500 mg po q day.    Allergies:  Allergies    No Known Allergies    Intolerances      ROS  [  ] UNABLE TO ELICIT    General:  [  ] None  [  ] Fever  [  ] Chills  [ x ] Malaise    Skin:  [  ] None [  ] Rash  [  ] Wound  [ x ] back abscess.    HEENT:  [ x ] None  [  ] Sore Throat  [  ] Nasal congestion/ runny nose  [  ] Photophobia [  ] Neck pain      Chest:  [ x ] None   [  ] SOB  [  ] Cough  [  ] None    Cardiovascular:   [ x ] None  [  ] CP  [  ] Palpitation    Gastrointestinal:  [ x ] None  [  ] Abd pain   [  ] Nausea    [  ] Vomiting   [  ] Diarrhea	     Genitourinary:  [ x ] None [  ] Polyuria   [  ] Urgency  [  ] Frequency  [  ] Dysuria    [  ]  Hematuria       Musculoskeletal:  [  ] None [  ] Back Pain	[ x ] Body aches  [  ] Joint pain    Neurological: [  ] None [  ]Dizziness  [  ]Visual Disturbance  [  ]Headaches   [ x ] Weakness        PHYSICAL EXAM:  Vital Signs Last 24 Hrs  T(C): 36.6 (16 Jan 2019 15:59), Max: 37 (15 Mo 2019 23:45)  T(F): 97.9 (16 Jan 2019 15:59), Max: 98.6 (15 Mo 2019 23:45)  HR: 69 (16 Jan 2019 15:59) (69 - 88)  BP: 129/87 (16 Jan 2019 15:59) (106/60 - 129/87)  BP(mean): --  RR: 20 (16 Jan 2019 15:59) (16 - 20)  SpO2: 100% (16 Jan 2019 15:59) (98% - 100%)    Constitutional:    HEENT: [ x ] Wnl  [  ] Pharyngeal congestion    Neck:  [ x ] Supple  [  ]Lymphadenopathy  [  ] No JVD   [  ] JVD  [  ] Masses   [  ] WNL    CHEST/Respiratory:  [ x ]Clear to auscultation  [  ] Rales   [  ] Rhonchi   [  ] Wheezing     [  ] Chest Tenderness      Cardiovascular:  [ x ] Reg S1 S2   [  ] Irreg S1 S2   [ x ]No Murmur  [  ] +ve Murmurs  [  ]Systolic [  ]Diastolic      Abdomen:  [ x ] Soft  [ x ] No tendrerness  [  ] Tenderness  [  ] Organomegaly  [  ] ABD Distention  [  ] Rigidity                       [ x ] No Regidity                       [ x ] No Rebound Tenderness  [  ] No Guarding Rigidity  [  ] Rebound Tenderness[  ] Guarding Rigidity                          [ x ]  +ve Bowel Sounds  [  ] Decreased Bowel Sounds    [  ] Absent Bowel Sounds                            Extremities: [ x ] No edema [  ] Edema  [  ] Clubbing   [  ] Cyanosis                         [ x ] No Tender Calf muscles  [  ] Tender Calf muscles                        [ x ] Palpable peripheral pulses    Neurological: [ x ] Awake  [ x ] Alert  [ x ] Oriented  x  3                           [  ] Confused  [  ] Drowsy  [  ] respond to painful stimuli  [  ] Unresponsive    Skin:  [  ] Intact [  ] Redness [  ] Thrombophlebitis  [  ] Rashes  [  ] Dry  [ x ] abscess surgical dressing on the right upper back with redness, induration and tenderness are improving    Ortho:  [  ] Joint Swelling  [  ] Joint erythema [  ] Joint tenderness                [  ] Increased temp. to touch  [  ] DJD [ x ] WNL        LABS/DIAGNOSTIC TESTS                        9.7    12.6  )-----------( 246      ( 16 Jan 2019 06:46 )             31.1   01-16    143  |  113<H>  |  19<H>  ----------------------------<  187<H>  4.4   |  22  |  2.10<H>    Ca    8.6      16 Jan 2019 06:46          CULTURES:   Culture - Surgical Swab (01.15.19 @ 10:53)    Specimen Source: .Surgical Swab right shoulder abscess    Culture Results:   Numerous Staphylococcus aureus    Culture - Urine (01.13.19 @ 22:56)    -  Gentamicin: S <=1    -  Imipenem: S <=1    -  Levofloxacin: S <=1    -  Meropenem: S <=1    -  Nitrofurantoin: S <=32 Should not be used to treat pyelonephritis    -  Piperacillin/Tazobactam: S <=8    -  Tigecycline: S <=1    -  Tobramycin: S <=2    -  Trimethoprim/Sulfamethoxazole: R >2/38    -  Amikacin: S <=8    -  Amoxicillin/Clavulanic Acid: S <=8/4    -  Ampicillin: R >16 These ampicillin results predict results for amoxicillin    -  Ampicillin/Sulbactam: I 16/8    -  Aztreonam: S <=4    -  Cefazolin: I 4 For uncomplicated UTI with K. pneumoniae, E. coli, or P. mirablis: JACINDA <=16 is sensitive and JACINDA >=32 is resistant. This also predicts results for oral agents cefaclor, cefdinir, cefpodoxime, cefprozil, cefuroxime axetil, cephalexin and locarbef for uncomplicated UTI. Note that some isolates may be susceptible to these agents while testing resistant to cefazolin.    -  Cefepime: S <=2    -  Cefoxitin: S <=4    -  Ceftriaxone: S <=1 Enterobacter, Citrobacter, and Serratia may develop resistance during prolonged therapy    -  Ciprofloxacin: S <=0.5    -  Ertapenem: S <=0.5    Specimen Source: .Urine Clean Catch (Midstream)       Culture - Blood (01.13.19 @ 22:56)    Specimen Source: .Blood Blood    Culture Results:   No growth to date.    Culture - Blood (01.13.19 @ 22:56)    Specimen Source: .Blood Blood    Culture Results:   No growth to date.        RADIOLOGY    CXR:    EXAM:  XR CHEST PA LAT 2V                            PROCEDURE DATE:  01/13/2019          INTERPRETATION:  Clinical Information: Dizziness    Technique: AP chest image.     Comparison: 04/25/2018    Findings: The heart is unremarkable. The lungs are clear. Bones are   unremarkable for age.    Impression: Clear lungs.        Assessment and Recommendation:  38/F with PMH of renal transplant in 1999, DM, CAD s/p stent LAD Dec, 2018 (on aspirin and Brilinta- [place]) came after fever and cellulitis of right shoulder started 3 days ago. Patient mentions that she was having SOB for about last month for which she was following up with her PCP and was told that it is 2/2 anxiety. About 2-3 days ago she started having subjective fever and generalized weakness about day later her son noted small pimple on her back which got worse to diffuse cellulitis around right scapular region over next 12 hours. She noted that area was red, and warm. Also had fever of 102.5 with chills for which she took tylenol and rested but next day she continued to have episodes of fever which brought her to hospital.  Patient was started on IV Unasyn and was given Vancomycin in the ED.  1/14/19 Patient had surgery done, and 1/15/19 Patient feels better and WBC is better.  1/16/19 ABX was changed to Cipro and Vancomycin as tissue culture grew staph aureus pending sensitivity and urine grew E. coli not sensitive to Unasyn.    Problem/Recommendation - 1:  Problem: Abscess. and UTI.  Recommendation:   1- Follow +ve tissue culture to final report.  2- Follow Blood culture to final report are still -VE.  3- Vancomycin 1 gm IVPB q day and follow trough.  4- Change IV Unasyn to PO Cipro for  mg q day.  5- Fluid and electrolytes management.  6- CBC and BMP follow up.   7- Back wound care as per surgery.    Problem/Recommendation - 2:  ·  Problem: DM (diabetes mellitus).    Recommendation:   1- Blood sugar monitoring and control.  2- Accu-Cheks with coverage.  3- 1800 jack ADA diet.  4- Follow HB A1C.     Problem/Recommendation - 3:  ·  Problem: Kidney transplant recipient.    Recommendation:   1- Continue renal transplant meds.  2- Nephrology management and follow up.     Discussed with medical resident.

## 2019-01-16 NOTE — DISCHARGE NOTE ADULT - MEDICATION SUMMARY - MEDICATIONS TO TAKE
I will START or STAY ON the medications listed below when I get home from the hospital:    Glucometer  -- 1 unit(s) subcutaneous once a day  -- Indication: For DM (diabetes mellitus)    Glucose test strips  -- 1 unit(s) subcutaneous 4 times a day  -- Indication: For DM (diabetes mellitus)    Needles  -- 1 unit(s) subcutaneous 4 times a day  -- Indication: For DM (diabetes mellitus)    Alcohol swabs  -- Apply on skin to affected area 4 times a day  -- Indication: For DM (diabetes mellitus)    predniSONE 5 mg oral tablet  -- 1 tab(s) by mouth once a day  -- Indication: For Kidney transplant recipient    aspirin 81 mg oral tablet  -- 1 tab(s) by mouth once a day  -- Indication: For CAD (coronary artery disease)    Lantus Solostar Pen 100 units/mL subcutaneous solution  -- 32 unit(s) subcutaneous once a day (at bedtime)  -- Do not drink alcoholic beverages when taking this medication.  It is very important that you take or use this exactly as directed.  Do not skip doses or discontinue unless directed by your doctor.  Keep in refrigerator.  Do not freeze.    -- Indication: For DM (diabetes mellitus)    HumaLOG Cartridge 100 units/mL subcutaneous solution  -- 12 unit(s) subcutaneous 3 times a day (before meals)  -- Do not drink alcoholic beverages when taking this medication.  It is very important that you take or use this exactly as directed.  Do not skip doses or discontinue unless directed by your doctor.  Keep in refrigerator.  Do not freeze.    -- Indication: For DM (diabetes mellitus)    atorvastatin 80 mg oral tablet  -- 1 tab(s) by mouth once a day  -- Indication: For CAD (coronary artery disease)    Brilinta (ticagrelor) 90 mg oral tablet  -- 1 tab(s) by mouth 2 times a day  -- Indication: For CAD (coronary artery disease)    Alcohol Swabs with Benzocaine topical pad  -- Apply on skin to affected area 4 times a day  -- For external use only.    -- Indication: For DM (diabetes mellitus)    metoprolol succinate 25 mg oral tablet, extended release  -- 1 tab(s) by mouth once a day  -- Indication: For HTN    NIFEdipine 60 mg oral tablet, extended release  -- 1 tab(s) by mouth once a day  -- Indication: For HTN    cephalexin 500 mg oral capsule  -- 1 cap(s) by mouth every 12 hours   -- Finish all this medication unless otherwise directed by prescriber.    -- Indication: For Abscess    Prograf 1 mg oral capsule  -- 3 cap(s) by mouth every 12 hours  -- Indication: For Kidney transplant recipient    sodium chloride 0.65% nasal spray  -- 1 spray(s) intranasally 2 times a day   -- For the nose.    -- Indication: For Allergies    Protonix 40 mg oral delayed release tablet  -- 1 tab(s) by mouth once a day  -- Indication: For Reflux    ciprofloxacin 500 mg oral tablet  -- 1 tab(s) by mouth every 24 hours  -- Indication: For UTI (urinary tract infection)    ergocalciferol 50,000 intl units (1.25 mg) oral capsule  -- 1 cap(s) by mouth once a week  -- Indication: For Supplement I will START or STAY ON the medications listed below when I get home from the hospital:    Glucometer  -- 1 unit(s) subcutaneous once a day  -- Indication: For DM (diabetes mellitus)    Glucose test strips  -- 1 unit(s) subcutaneous 4 times a day  -- Indication: For DM (diabetes mellitus)    Alcohol swabs  -- Apply on skin to affected area 4 times a day  -- Indication: For DM (diabetes mellitus)    Needles  -- 1 unit(s) subcutaneous 4 times a day  -- Indication: For DM (diabetes mellitus)    predniSONE 5 mg oral tablet  -- 1 tab(s) by mouth once a day  -- Indication: For Kidney transplant recipient    aspirin 81 mg oral tablet  -- 1 tab(s) by mouth once a day  -- Indication: For CAD (coronary artery disease)    Lantus Solostar Pen 100 units/mL subcutaneous solution  -- 32 unit(s) subcutaneous once a day (at bedtime)  -- Do not drink alcoholic beverages when taking this medication.  It is very important that you take or use this exactly as directed.  Do not skip doses or discontinue unless directed by your doctor.  Keep in refrigerator.  Do not freeze.    -- Indication: For DM (diabetes mellitus)    HumaLOG Cartridge 100 units/mL subcutaneous solution  -- 12 unit(s) subcutaneous 3 times a day (before meals)  -- Do not drink alcoholic beverages when taking this medication.  It is very important that you take or use this exactly as directed.  Do not skip doses or discontinue unless directed by your doctor.  Keep in refrigerator.  Do not freeze.    -- Indication: For DM (diabetes mellitus)    atorvastatin 80 mg oral tablet  -- 1 tab(s) by mouth once a day  -- Indication: For CAD (coronary artery disease)    Brilinta (ticagrelor) 90 mg oral tablet  -- 1 tab(s) by mouth 2 times a day  -- Indication: For CAD (coronary artery disease)    Alcohol Swabs with Benzocaine topical pad  -- Apply on skin to affected area 4 times a day  -- For external use only.    -- Indication: For DM (diabetes mellitus)    metoprolol succinate 25 mg oral tablet, extended release  -- 1 tab(s) by mouth once a day  -- Indication: For HTN    NIFEdipine 60 mg oral tablet, extended release  -- 1 tab(s) by mouth once a day  -- Indication: For HTN    cephalexin 500 mg oral capsule  -- 1 cap(s) by mouth every 12 hours   -- Finish all this medication unless otherwise directed by prescriber.    -- Indication: For Abscess    Prograf 1 mg oral capsule  -- 3 cap(s) by mouth every 12 hours  -- Indication: For Kidney transplant recipient    sodium chloride 0.65% nasal spray  -- 1 spray(s) intranasally 2 times a day   -- For the nose.    -- Indication: For Allergies    Protonix 40 mg oral delayed release tablet  -- 1 tab(s) by mouth once a day  -- Indication: For Reflux    ciprofloxacin 500 mg oral tablet  -- 1 tab(s) by mouth every 24 hours  -- Indication: For UTI (urinary tract infection)    ergocalciferol 50,000 intl units (1.25 mg) oral capsule  -- 1 cap(s) by mouth once a week  -- Indication: For Supplement

## 2019-01-17 VITALS
HEART RATE: 71 BPM | DIASTOLIC BLOOD PRESSURE: 80 MMHG | RESPIRATION RATE: 20 BRPM | SYSTOLIC BLOOD PRESSURE: 122 MMHG | OXYGEN SATURATION: 99 % | TEMPERATURE: 98 F

## 2019-01-17 LAB
-  AMOXICILLIN/CLAVULANIC ACID: SIGNIFICANT CHANGE UP
-  AMPICILLIN/SULBACTAM: SIGNIFICANT CHANGE UP
-  AMPICILLIN: SIGNIFICANT CHANGE UP
-  CEFAZOLIN: SIGNIFICANT CHANGE UP
-  CEFTRIAXONE: SIGNIFICANT CHANGE UP
-  CIPROFLOXACIN: SIGNIFICANT CHANGE UP
-  CLINDAMYCIN: SIGNIFICANT CHANGE UP
-  DAPTOMYCIN: SIGNIFICANT CHANGE UP
-  ERYTHROMYCIN: SIGNIFICANT CHANGE UP
-  GENTAMICIN: SIGNIFICANT CHANGE UP
-  LEVOFLOXACIN: SIGNIFICANT CHANGE UP
-  LINEZOLID: SIGNIFICANT CHANGE UP
-  MEROPENEM: SIGNIFICANT CHANGE UP
-  MOXIFLOXACIN(AEROBIC): SIGNIFICANT CHANGE UP
-  OXACILLIN: SIGNIFICANT CHANGE UP
-  PENICILLIN: SIGNIFICANT CHANGE UP
-  RIFAMPIN: SIGNIFICANT CHANGE UP
-  TETRACYCLINE: SIGNIFICANT CHANGE UP
-  TRIMETHOPRIM/SULFAMETHOXAZOLE: SIGNIFICANT CHANGE UP
-  VANCOMYCIN: SIGNIFICANT CHANGE UP
ANION GAP SERPL CALC-SCNC: 10 MMOL/L — SIGNIFICANT CHANGE UP (ref 5–17)
BUN SERPL-MCNC: 21 MG/DL — HIGH (ref 7–18)
CALCIUM SERPL-MCNC: 8.1 MG/DL — LOW (ref 8.4–10.5)
CHLORIDE SERPL-SCNC: 111 MMOL/L — HIGH (ref 96–108)
CO2 SERPL-SCNC: 22 MMOL/L — SIGNIFICANT CHANGE UP (ref 22–31)
CREAT SERPL-MCNC: 1.77 MG/DL — HIGH (ref 0.5–1.3)
GLUCOSE BLDC GLUCOMTR-MCNC: 174 MG/DL — HIGH (ref 70–99)
GLUCOSE BLDC GLUCOMTR-MCNC: 208 MG/DL — HIGH (ref 70–99)
GLUCOSE SERPL-MCNC: 141 MG/DL — HIGH (ref 70–99)
HCT VFR BLD CALC: 29.2 % — LOW (ref 34.5–45)
HGB BLD-MCNC: 9.2 G/DL — LOW (ref 11.5–15.5)
MCHC RBC-ENTMCNC: 27.3 PG — SIGNIFICANT CHANGE UP (ref 27–34)
MCHC RBC-ENTMCNC: 31.6 GM/DL — LOW (ref 32–36)
MCV RBC AUTO: 86.6 FL — SIGNIFICANT CHANGE UP (ref 80–100)
METHOD TYPE: SIGNIFICANT CHANGE UP
PLATELET # BLD AUTO: 258 K/UL — SIGNIFICANT CHANGE UP (ref 150–400)
POTASSIUM SERPL-MCNC: 3.8 MMOL/L — SIGNIFICANT CHANGE UP (ref 3.5–5.3)
POTASSIUM SERPL-SCNC: 3.8 MMOL/L — SIGNIFICANT CHANGE UP (ref 3.5–5.3)
RBC # BLD: 3.38 M/UL — LOW (ref 3.8–5.2)
RBC # FLD: 11.9 % — SIGNIFICANT CHANGE UP (ref 10.3–14.5)
SODIUM SERPL-SCNC: 143 MMOL/L — SIGNIFICANT CHANGE UP (ref 135–145)
WBC # BLD: 12.5 K/UL — HIGH (ref 3.8–10.5)
WBC # FLD AUTO: 12.5 K/UL — HIGH (ref 3.8–10.5)

## 2019-01-17 PROCEDURE — 86850 RBC ANTIBODY SCREEN: CPT

## 2019-01-17 PROCEDURE — 80061 LIPID PANEL: CPT

## 2019-01-17 PROCEDURE — 85027 COMPLETE CBC AUTOMATED: CPT

## 2019-01-17 PROCEDURE — 84484 ASSAY OF TROPONIN QUANT: CPT

## 2019-01-17 PROCEDURE — 83880 ASSAY OF NATRIURETIC PEPTIDE: CPT

## 2019-01-17 PROCEDURE — 85730 THROMBOPLASTIN TIME PARTIAL: CPT

## 2019-01-17 PROCEDURE — 93005 ELECTROCARDIOGRAM TRACING: CPT

## 2019-01-17 PROCEDURE — 87186 SC STD MICRODIL/AGAR DIL: CPT

## 2019-01-17 PROCEDURE — 96375 TX/PRO/DX INJ NEW DRUG ADDON: CPT

## 2019-01-17 PROCEDURE — 82962 GLUCOSE BLOOD TEST: CPT

## 2019-01-17 PROCEDURE — 84156 ASSAY OF PROTEIN URINE: CPT

## 2019-01-17 PROCEDURE — 82570 ASSAY OF URINE CREATININE: CPT

## 2019-01-17 PROCEDURE — 96374 THER/PROPH/DIAG INJ IV PUSH: CPT

## 2019-01-17 PROCEDURE — 82306 VITAMIN D 25 HYDROXY: CPT

## 2019-01-17 PROCEDURE — 81001 URINALYSIS AUTO W/SCOPE: CPT

## 2019-01-17 PROCEDURE — 80053 COMPREHEN METABOLIC PANEL: CPT

## 2019-01-17 PROCEDURE — 83605 ASSAY OF LACTIC ACID: CPT

## 2019-01-17 PROCEDURE — 83735 ASSAY OF MAGNESIUM: CPT

## 2019-01-17 PROCEDURE — 71046 X-RAY EXAM CHEST 2 VIEWS: CPT

## 2019-01-17 PROCEDURE — 84702 CHORIONIC GONADOTROPIN TEST: CPT

## 2019-01-17 PROCEDURE — 85379 FIBRIN DEGRADATION QUANT: CPT

## 2019-01-17 PROCEDURE — 83036 HEMOGLOBIN GLYCOSYLATED A1C: CPT

## 2019-01-17 PROCEDURE — 80048 BASIC METABOLIC PNL TOTAL CA: CPT

## 2019-01-17 PROCEDURE — 82607 VITAMIN B-12: CPT

## 2019-01-17 PROCEDURE — 87070 CULTURE OTHR SPECIMN AEROBIC: CPT

## 2019-01-17 PROCEDURE — 87086 URINE CULTURE/COLONY COUNT: CPT

## 2019-01-17 PROCEDURE — 86901 BLOOD TYPING SEROLOGIC RH(D): CPT

## 2019-01-17 PROCEDURE — 80197 ASSAY OF TACROLIMUS: CPT

## 2019-01-17 PROCEDURE — 87040 BLOOD CULTURE FOR BACTERIA: CPT

## 2019-01-17 PROCEDURE — 99285 EMERGENCY DEPT VISIT HI MDM: CPT | Mod: 25

## 2019-01-17 PROCEDURE — 84443 ASSAY THYROID STIM HORMONE: CPT

## 2019-01-17 PROCEDURE — 84100 ASSAY OF PHOSPHORUS: CPT

## 2019-01-17 PROCEDURE — 76775 US EXAM ABDO BACK WALL LIM: CPT

## 2019-01-17 PROCEDURE — 83935 ASSAY OF URINE OSMOLALITY: CPT

## 2019-01-17 PROCEDURE — 76775 US EXAM ABDO BACK WALL LIM: CPT | Mod: 26

## 2019-01-17 PROCEDURE — 84300 ASSAY OF URINE SODIUM: CPT

## 2019-01-17 PROCEDURE — 36415 COLL VENOUS BLD VENIPUNCTURE: CPT

## 2019-01-17 PROCEDURE — 82746 ASSAY OF FOLIC ACID SERUM: CPT

## 2019-01-17 PROCEDURE — 86900 BLOOD TYPING SEROLOGIC ABO: CPT

## 2019-01-17 PROCEDURE — 82550 ASSAY OF CK (CPK): CPT

## 2019-01-17 PROCEDURE — 93306 TTE W/DOPPLER COMPLETE: CPT

## 2019-01-17 PROCEDURE — 85610 PROTHROMBIN TIME: CPT

## 2019-01-17 PROCEDURE — 82553 CREATINE MB FRACTION: CPT

## 2019-01-17 PROCEDURE — 88304 TISSUE EXAM BY PATHOLOGIST: CPT

## 2019-01-17 RX ORDER — CIPROFLOXACIN LACTATE 400MG/40ML
1 VIAL (ML) INTRAVENOUS
Qty: 4 | Refills: 0
Start: 2019-01-17 | End: 2019-01-20

## 2019-01-17 RX ORDER — CEPHALEXIN 500 MG
1 CAPSULE ORAL
Qty: 20 | Refills: 0
Start: 2019-01-17 | End: 2019-01-26

## 2019-01-17 RX ORDER — CHOLECALCIFEROL (VITAMIN D3) 125 MCG
1 CAPSULE ORAL
Qty: 30 | Refills: 0
Start: 2019-01-17 | End: 2019-02-15

## 2019-01-17 RX ORDER — INSULIN LISPRO 100/ML
15 VIAL (ML) SUBCUTANEOUS
Qty: 1 | Refills: 0
Start: 2019-01-17 | End: 2019-02-15

## 2019-01-17 RX ORDER — ISOPROPYL ALCOHOL, BENZOCAINE .7; .06 ML/ML; ML/ML
1 SWAB TOPICAL
Qty: 3 | Refills: 0
Start: 2019-01-17 | End: 2019-02-15

## 2019-01-17 RX ADMIN — Medication 13 UNIT(S): at 11:58

## 2019-01-17 RX ADMIN — Medication 5 MILLIGRAM(S): at 05:27

## 2019-01-17 RX ADMIN — Medication 1: at 08:38

## 2019-01-17 RX ADMIN — Medication 13 UNIT(S): at 08:39

## 2019-01-17 RX ADMIN — Medication 650 MILLIGRAM(S): at 09:15

## 2019-01-17 RX ADMIN — Medication 650 MILLIGRAM(S): at 08:42

## 2019-01-17 RX ADMIN — Medication 2: at 11:59

## 2019-01-17 RX ADMIN — Medication 25 MILLIGRAM(S): at 05:34

## 2019-01-17 RX ADMIN — Medication 60 MILLIGRAM(S): at 05:27

## 2019-01-17 RX ADMIN — Medication 81 MILLIGRAM(S): at 11:55

## 2019-01-17 RX ADMIN — TACROLIMUS 3 MILLIGRAM(S): 5 CAPSULE ORAL at 05:27

## 2019-01-17 RX ADMIN — TICAGRELOR 90 MILLIGRAM(S): 90 TABLET ORAL at 05:27

## 2019-01-17 NOTE — PROGRESS NOTE ADULT - REASON FOR ADMISSION
fever and right shoulder cellulitis

## 2019-01-17 NOTE — PROGRESS NOTE ADULT - SUBJECTIVE AND OBJECTIVE BOX
Interval Events: no events, sugars better controlled      Allergies    No Known Allergies    Intolerances      Endocrine/Metabolic Medications:  atorvastatin 80 milliGRAM(s) Oral at bedtime  insulin glargine Injectable (LANTUS) 40 Unit(s) SubCutaneous at bedtime  insulin lispro (HumaLOG) corrective regimen sliding scale   SubCutaneous three times a day before meals  insulin lispro Injectable (HumaLOG) 13 Unit(s) SubCutaneous three times a day with meals  predniSONE   Tablet 5 milliGRAM(s) Oral daily      Vital Signs Last 24 Hrs  T(C): 36.6 (17 Jan 2019 08:29), Max: 36.6 (16 Jan 2019 15:59)  T(F): 97.8 (17 Jan 2019 08:29), Max: 97.9 (16 Jan 2019 15:59)  HR: 71 (17 Jan 2019 08:29) (65 - 71)  BP: 122/80 (17 Jan 2019 08:29) (121/86 - 153/53)  BP(mean): --  RR: 20 (17 Jan 2019 08:29) (20 - 20)  SpO2: 99% (17 Jan 2019 08:29) (99% - 100%)      PHYSICAL EXAM  All physical exam findings normal, except those marked:  General:	Alert, active, cooperative, NAD, well hydrated  .		[] Abnormal:  Neck		Normal: supple, no cervical adenopathy, no palpable thyroid  .		[] Abnormal:  Cardiovascular	Normal: regular rate, normal S1, S2, no murmurs  .		[] Abnormal:  Respiratory	Normal: no chest wall deformity, normal respiratory pattern, CTA B/L  .		[] Abnormal:  Abdominal	Normal: soft, ND, NT, bowel sounds present, no masses, no organomegaly  .		[] Abnormal:  		Normal normal genitalia, testes descended, circumcised/uncircumcised  .		Anastasia stage:			Breast anastasia:  .		Menstrual history:  .		[] Abnormal:  Extremities	Normal: FROM x4  .		[] Abnormal:  Skin		R shoulder cellulitis  Neurologic	Normal: grossly intact  .		[] Abnormal:    LABS                        9.2    12.5  )-----------( 258      ( 17 Jan 2019 06:09 )             29.2                               143    |  111    |  21                  Calcium: 8.1   / iCa: x      (01-17 @ 06:09)    ----------------------------<  141       Magnesium: x                                3.8     |  22     |  1.77             Phosphorous: x        POCT Blood Glucose.: 174 mg/dL (17 Jan 2019 08:26)  POCT Blood Glucose.: 236 mg/dL (16 Jan 2019 21:22)  POCT Blood Glucose.: 263 mg/dL (16 Jan 2019 16:40)  POCT Blood Glucose.: 261 mg/dL (16 Jan 2019 11:27)

## 2019-01-17 NOTE — PROGRESS NOTE ADULT - PROBLEM SELECTOR PLAN 5
Pre-renal vs worsening renal function given transplant status  Avoid nephrotoxic drugs  Nephro follow up

## 2019-01-17 NOTE — PROGRESS NOTE ADULT - SUBJECTIVE AND OBJECTIVE BOX
S/p incision and drainage of back abscess 1/14  Patient seen and examined at bedside with no complaints.     Vital Signs Last 24 Hrs  T(F): 97.8 (01-17-19 @ 08:29), Max: 97.9 (01-16-19 @ 15:59)  HR: 71 (01-17-19 @ 08:29)  BP: 122/80 (01-17-19 @ 08:29)  RR: 20 (01-17-19 @ 08:29)  SpO2: 99% (01-17-19 @ 08:29)  POCT Blood Glucose.: 174 mg/dL (17 Jan 2019 08:26)    GENERAL: Alert, NAD  CHEST/LUNG: respirations nonlabored  BACK: base with granulation tissue, no purulence no bleed, some necrotic tissue at wound edges. no surrounding erythema or crepitus. Wound redressed with wet to dry packing. Covered with foam dressing    I&O's Detail    LABS:                        9.2    12.5  )-----------( 258      ( 17 Jan 2019 06:09 )             29.2     01-17    143  |  111<H>  |  21<H>  ----------------------------<  141<H>  3.8   |  22  |  1.77<H>    Ca    8.1<L>      17 Jan 2019 06:09

## 2019-01-17 NOTE — PROGRESS NOTE ADULT - ASSESSMENT
38/F with PMH of renal transplant in 1999, DM, CAD s/p stent LAD Dec, 2018 (on aspirin and Brilinta- [place]) came after fever and cellulitis of right shoulder started 3 days ago. Patient mentions that she was having SOB for about last month for which she was following up with her PCP and was told that it is 2/2 anxiety. About 2-3 days ago she started having subjective fever and generalized weakness about day later her son noted small pimple on her back which got worse to diffuse cellulitis around right scapular region over next 12 hours. She noted that area was red, and warm. Also had fever of 102.5 with chills for which she took tylenol and rested but next day she continued to have episodes of fever which brought her to hospital. Denies nausea, vomiting, diarrhea, abdominal pain, chest pain, palpitations, dizziness, headache, cough, wheezing, joint pain or swelling.    In ED:   vitals: 90/49, 92, 98.3, 20 (100)  labs pertinent for BUN/Cr 19/2.33 (baseline 14/1.18)  UA positive   lactate 1.9  chest xray: clear   s/p 1 dose of clinda in ED   blood and urine cultures sent   500 cc bolus given
38 year old female s/p incision and drainage of right upper back abscess POD#3, uncontrolled DM    - continue local wound care  - continue antibiotics per ID  - diabetes management  - continue medical management
38/F with PMH of renal transplant in 1999, DM, CAD s/p stent LAD Dec, 2018 (on aspirin and Brilinta- [place]) came after fever and cellulitis of right shoulder started 3 days ago. Endocrinology was consulted for uncontrolled DM.
38/F with PMH of renal transplant in 1999, DM, CAD s/p stent LAD Dec, 2018 (on aspirin and Brilinta- [place]) came after fever and cellulitis of right shoulder started 3 days ago. Endocrinology was consulted for uncontrolled DM.
38/F with PMH of renal transplant in 1999, DM, CAD s/p stent LAD Dec, 2018 (on aspirin and Brilinta- [place]) came after fever and cellulitis of right shoulder started 3 days ago. Patient mentions that she was having SOB for about last month for which she was following up with her PCP and was told that it is 2/2 anxiety. About 2-3 days ago she started having subjective fever and generalized weakness about day later her son noted small pimple on her back which got worse to diffuse cellulitis around right scapular region over next 12 hours. She noted that area was red, and warm. Also had fever of 102.5 with chills for which she took tylenol and rested but next day she continued to have episodes of fever which brought her to hospital. Denies nausea, vomiting, diarrhea, abdominal pain, chest pain, palpitations, dizziness, headache, cough, wheezing, joint pain or swelling.    In ED:   vitals: 90/49, 92, 98.3, 20 (100)  labs pertinent for BUN/Cr 19/2.33 (baseline 14/1.18)  UA positive   lactate 1.9  chest xray: clear   s/p 1 dose of clinda in ED   blood and urine cultures sent   500 cc bolus given
38/F with PMH of renal transplant in 1999, DM, CAD s/p stent LAD Dec, 2018 (on aspirin and Brilinta- [place]) came after fever and cellulitis of right shoulder started 3 days ago. Patient mentions that she was having SOB for about last month for which she was following up with her PCP and was told that it is 2/2 anxiety. About 2-3 days ago she started having subjective fever and generalized weakness about day later her son noted small pimple on her back which got worse to diffuse cellulitis around right scapular region over next 12 hours. She noted that area was red, and warm. Also had fever of 102.5 with chills for which she took tylenol and rested but next day she continued to have episodes of fever which brought her to hospital. Denies nausea, vomiting, diarrhea, abdominal pain, chest pain, palpitations, dizziness, headache, cough, wheezing, joint pain or swelling.    In ED:   vitals: 90/49, 92, 98.3, 20 (100)  labs pertinent for BUN/Cr 19/2.33 (baseline 14/1.18)  UA positive   lactate 1.9  chest xray: clear   s/p 1 dose of clinda in ED   blood and urine cultures sent   500 cc bolus given
MICHAEL due to SIR and infection, abscess in back area and cellulitis.  Dehydration due to Glycosuria.  Non oliguric  Renal transplant on Prednisone and Tacrolimus.  Follow Tacrolimus T level in am.    Continue IV fluids.  Follow BS .
MICHAEL due to sepsis, SIR  Hyperglycemia and glycosuria with fluid losses.    Hypernatremia    Continue IV fluid  Await US of native kidneys and transpalnt.    await Tacrolimus level.
s/p i&d right upper back/shoulder abcess  wound healing well  local wound care wet to dry dressings. Pt may shower prior to new gauze placement daily if desired and may let shower water wash wound prior to new gauze dressing applied.  pt on abx  oob
s/p i&d right upper back/shoulder abscess POd#2    wound healing well  local wound care wet to dry dressings. Pt may shower prior to new gauze placement daily if desired and may let shower water wash wound prior to new gauze dressing applied.  pt on abx  oob
MICHAEL non oliguric  UTI and MSSA abscess.    patient creatinine and BUN improved.    Tacrolimus level was not trough    Minimal hydronephrosis, may be a variant post transplant versus cystitis  Renal function improved    Issues discussed with the patient , will follow as an out patient at [facility]  Follow tacolimus leve and renal function in the outside.

## 2019-01-17 NOTE — PROGRESS NOTE ADULT - SUBJECTIVE AND OBJECTIVE BOX
Meds:  Vancomycin 1 gm IVPB q day.  Cipro 500 mg po q day.    Allergies:  Allergies    No Known Allergies    Intolerances      ROS  [  ] UNABLE TO ELICIT    General:  [  ] None  [  ] Fever  [  ] Chills  [ x ] Malaise    Skin:  [  ] None [  ] Rash  [  ] Wound  [ x ] back abscess.    HEENT:  [ x ] None  [  ] Sore Throat  [  ] Nasal congestion/ runny nose  [  ] Photophobia [  ] Neck pain      Chest:  [ x ] None   [  ] SOB  [  ] Cough  [  ] None    Cardiovascular:   [ x ] None  [  ] CP  [  ] Palpitation    Gastrointestinal:  [ x ] None  [  ] Abd pain   [  ] Nausea    [  ] Vomiting   [  ] Diarrhea	     Genitourinary:  [ x ] None [  ] Polyuria   [  ] Urgency  [  ] Frequency  [  ] Dysuria    [  ]  Hematuria       Musculoskeletal:  [  ] None [  ] Back Pain	[ x ] Body aches  [  ] Joint pain    Neurological: [  ] None [  ]Dizziness  [  ]Visual Disturbance  [  ]Headaches   [ x ] Weakness        PHYSICAL EXAM:  Vital Signs Last 24 Hrs  T(C): 36.6 (17 Jan 2019 08:29), Max: 36.6 (16 Jan 2019 15:59)  T(F): 97.8 (17 Jan 2019 08:29), Max: 97.9 (16 Jan 2019 15:59)  HR: 71 (17 Jan 2019 08:29) (65 - 71)  BP: 122/80 (17 Jan 2019 08:29) (121/86 - 153/53)  BP(mean): --  RR: 20 (17 Jan 2019 08:29) (20 - 20)  SpO2: 99% (17 Jan 2019 08:29) (99% - 100%)    Constitutional:    HEENT: [ x ] Wnl  [  ] Pharyngeal congestion    Neck:  [ x ] Supple  [  ]Lymphadenopathy  [  ] No JVD   [  ] JVD  [  ] Masses   [  ] WNL    CHEST/Respiratory:  [ x ]Clear to auscultation  [  ] Rales   [  ] Rhonchi   [  ] Wheezing     [  ] Chest Tenderness      Cardiovascular:  [ x ] Reg S1 S2   [  ] Irreg S1 S2   [ x ]No Murmur  [  ] +ve Murmurs  [  ]Systolic [  ]Diastolic      Abdomen:  [ x ] Soft  [ x ] No tendrerness  [  ] Tenderness  [  ] Organomegaly  [  ] ABD Distention  [  ] Rigidity                       [ x ] No Regidity                       [ x ] No Rebound Tenderness  [  ] No Guarding Rigidity  [  ] Rebound Tenderness[  ] Guarding Rigidity                          [ x ]  +ve Bowel Sounds  [  ] Decreased Bowel Sounds    [  ] Absent Bowel Sounds                            Extremities: [ x ] No edema [  ] Edema  [  ] Clubbing   [  ] Cyanosis                         [ x ] No Tender Calf muscles  [  ] Tender Calf muscles                        [ x ] Palpable peripheral pulses    Neurological: [ x ] Awake  [ x ] Alert  [ x ] Oriented  x  3                           [  ] Confused  [  ] Drowsy  [  ] respond to painful stimuli  [  ] Unresponsive    Skin:  [  ] Intact [  ] Redness [  ] Thrombophlebitis  [  ] Rashes  [  ] Dry  [ x ] abscess surgical dressing on the right upper back with redness, induration and tenderness are improving    Ortho:  [  ] Joint Swelling  [  ] Joint erythema [  ] Joint tenderness                [  ] Increased temp. to touch  [  ] DJD [ x ] WNL        LABS/DIAGNOSTIC TESTS                        9.2    12.5  )-----------( 258      ( 17 Jan 2019 06:09 )             29.2   01-17    143  |  111<H>  |  21<H>  ----------------------------<  141<H>  3.8   |  22  |  1.77<H>    Ca    8.1<L>      17 Jan 2019 06:09    CAPILLARY BLOOD GLUCOSE      POCT Blood Glucose.: 208 mg/dL (17 Jan 2019 11:56)  POCT Blood Glucose.: 174 mg/dL (17 Jan 2019 08:26)  POCT Blood Glucose.: 236 mg/dL (16 Jan 2019 21:22)  POCT Blood Glucose.: 263 mg/dL (16 Jan 2019 16:40)          CULTURES:   Culture - Surgical Swab (01.15.19 @ 10:53)    -  Gentamicin: S <=1 Should not be used as monotherapy    -  Levofloxacin: S <=0.5    -  Linezolid: S 1    -  Meropenem: S <=2    -  Moxifloxacin(Aerobic): S <=0.5    -  Oxacillin: S <=0.25    -  Penicillin: R 0.5    -  RIF- Rifampin: S <=1 Should not be used as monotherapy    -  Tetra/Doxy: R >8    -  Trimethoprim/Sulfamethoxazole: S <=0.5/9.5    -  Vancomycin: S 1    -  Amoxicillin/Clavulanic Acid: S <=4/2    -  Ampicillin: R <=2    -  Ampicillin/Sulbactam: S <=8/4    -  Cefazolin: S <=4    -  Ciprofloxacin: S <=1    -  Clindamycin: R <=0.25 This isolate is presumed to be clindamycin resistant based on detection of inducible resistance. Clindamycin may still be effective in some patients.    -  Daptomycin: S <=0.25    -  Ceftriaxone: S <=4    -  Erythromycin: R >4    Specimen Source: .Surgical Swab right shoulder abscess    Culture Results:   Numerous Staphylococcus aureus    Organism Identification: Staphylococcus aureus    Organism: Staphylococcus aureus    Method Type: JACINDA      Culture - Surgical Swab (01.15.19 @ 10:53)    Specimen Source: .Surgical Swab right shoulder abscess    Culture Results:   Numerous Staphylococcus aureus    Culture - Urine (01.13.19 @ 22:56)    -  Gentamicin: S <=1    -  Imipenem: S <=1    -  Levofloxacin: S <=1    -  Meropenem: S <=1    -  Nitrofurantoin: S <=32 Should not be used to treat pyelonephritis    -  Piperacillin/Tazobactam: S <=8    -  Tigecycline: S <=1    -  Tobramycin: S <=2    -  Trimethoprim/Sulfamethoxazole: R >2/38    -  Amikacin: S <=8    -  Amoxicillin/Clavulanic Acid: S <=8/4    -  Ampicillin: R >16 These ampicillin results predict results for amoxicillin    -  Ampicillin/Sulbactam: I 16/8    -  Aztreonam: S <=4    -  Cefazolin: I 4 For uncomplicated UTI with K. pneumoniae, E. coli, or P. mirablis: JACINDA <=16 is sensitive and JACINDA >=32 is resistant. This also predicts results for oral agents cefaclor, cefdinir, cefpodoxime, cefprozil, cefuroxime axetil, cephalexin and locarbef for uncomplicated UTI. Note that some isolates may be susceptible to these agents while testing resistant to cefazolin.    -  Cefepime: S <=2    -  Cefoxitin: S <=4    -  Ceftriaxone: S <=1 Enterobacter, Citrobacter, and Serratia may develop resistance during prolonged therapy    -  Ciprofloxacin: S <=0.5    -  Ertapenem: S <=0.5    Specimen Source: .Urine Clean Catch (Midstream)       Culture - Blood (01.13.19 @ 22:56)    Specimen Source: .Blood Blood    Culture Results:   No growth to date.    Culture - Blood (01.13.19 @ 22:56)    Specimen Source: .Blood Blood    Culture Results:   No growth to date.        RADIOLOGY    CXR:    EXAM:  XR CHEST PA LAT 2V                            PROCEDURE DATE:  01/13/2019          INTERPRETATION:  Clinical Information: Dizziness    Technique: AP chest image.     Comparison: 04/25/2018    Findings: The heart is unremarkable. The lungs are clear. Bones are   unremarkable for age.    Impression: Clear lungs.        Assessment and Recommendation:  38/F with PMH of renal transplant in 1999, DM, CAD s/p stent LAD Dec, 2018 (on aspirin and Brilinta- [place]) came after fever and cellulitis of right shoulder started 3 days ago. Patient mentions that she was having SOB for about last month for which she was following up with her PCP and was told that it is 2/2 anxiety. About 2-3 days ago she started having subjective fever and generalized weakness about day later her son noted small pimple on her back which got worse to diffuse cellulitis around right scapular region over next 12 hours. She noted that area was red, and warm. Also had fever of 102.5 with chills for which she took tylenol and rested but next day she continued to have episodes of fever which brought her to hospital.  Patient was started on IV Unasyn and was given Vancomycin in the ED.  1/14/19 Patient had surgery done, and 1/15/19 Patient feels better and WBC is better.  1/16/19 ABX was changed to Cipro and Vancomycin as tissue culture grew staph aureus pending sensitivity and urine grew E. coli not sensitive to Unasyn.  1/17/19 Patient feels better and wants to go home, surgical culture grew MSSA.    Problem/Recommendation - 1:  Problem: Abscess. and UTI.  Recommendation:   1- Follow +ve tissue culture to final report.  2- Follow Blood culture to final report are still -VE.  3- Change Vancomycin to Keflex 500 mg po q 12 hours till 1/27/19.  4- Continue PO Cipro for  mg q day till 1/22/19.  5- Fluid and electrolytes management.  6- CBC and BMP follow up.   7- Back wound care as per surgery.    Problem/Recommendation - 2:  ·  Problem: DM (diabetes mellitus).    Recommendation:   1- Blood sugar monitoring and control.  2- Accu-Cheks with coverage.  3- 1800 jack ADA diet.  4- Follow HB A1C.     Problem/Recommendation - 3:  ·  Problem: Kidney transplant recipient.    Recommendation:   1- Continue renal transplant meds.  2- Nephrology management and follow up.     Discussed with medical resident, and patient on the bedside.

## 2019-01-17 NOTE — MEDICAL STUDENT ADULT H&P (EDUCATION) - NS MD HP STUD HX OF PRESENT ILLNESS FT
Patient was seen and examined at bedside. The patient states that she has intermittent throbbing pain in her right shoulder that has increased due to laying flat for renal ultrasound this morning. Patient states that the pain is manageable with tylenol. Patient denies nausea, vomiting, fever, chills, difficultly with urination, changes in stool, muscle pain, or headaches.

## 2019-01-17 NOTE — MEDICAL STUDENT ADULT H&P (EDUCATION) - NS MD HP STUD ASPLAN PLAN FT
Problem/Plan - 1: Abscess of right shoulder  -s/p clinda in ED  -s/p 1 dose of vanco and zosyn due to worsening renal function in setting of renal transplant  -c/w unasyn  -blood cultures negative  -POD#2 s/p I&D  -prelim surgical culture: few staph aureus  -f/u with culture  -ID Dr. Ovalles	    Problem/Plan - 2:  UTI  -asymptomatic, positive UA  -urine culture: corynebacterium, pansensitive  -c/w cipro and flagyl  -ID Dr. Ovalles    Problem/Plan - 3: CAD  -s/p LAD stent in 12/2018 by Dr. Joel Herndon ([place])  -c/w brilinta and aspirin    Problem/Plan - 4: MICHAEL (acute kidney injury)  -BUN/Cr 19/2.10 (baseline 14/1.18)  -consult Dr. Corey (nephrology) due to hx of renal transplant  -f/u BUN/Cr and renal u/s
Problem/Plan - 1: Abscess of right shoulder  -c/w vancomycin  -blood cultures negative to date  -POD#3 s/p I&D  -prelim surgical culture: few staph aureus  -f/u with culture  -ID Dr. Ovalles	    Problem/Plan - 2:  UTI  -asymptomatic, positive UA  -urine culture: corynebacterium, pansensitive  -c/w cipro and flagyl  -ID Dr. Ovalles    Problem/Plan - 3: DM (uncontrolled)  -HbA1c 10.9  -POCT glucose 141 (1/17/19 0609AM)  -c/w latus 40 units and humalog 13units TID + sliding scale  -consult with Endocrinology    Problem/Plan - 4: MICHAEL (acute kidney injury)  -BUN/Cr 21/1.77 (baseline 14/1.18)  -consult Dr. 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 Right lower quadrant renal transplant with minimal hydronephrosis.  -f/u BUN/Cr

## 2019-01-17 NOTE — PROGRESS NOTE ADULT - PROBLEM SELECTOR PLAN 1
hx uncontrolled DM  HbA1C 10.9  home meds: levemir 30 units and 12 premeal novolog TID  c/w 40units lantus hs and c/w 13 units humalog  no oral DM meds 2/2 CKD and renal transplant  monitor FS ac hs  nutrition eval hx uncontrolled DM  HbA1C 10.9  home meds: levemir 30 units and 12 premeal novolog TID  c/w 40units lantus hs and c/w 15 units humalog  no oral DM meds 2/2 CKD and renal transplant  monitor FS ac hs  nutrition eval

## 2019-01-17 NOTE — MEDICAL STUDENT ADULT H&P (EDUCATION) - NS MD HP STUD MEDICATIONS FT
vancomycin IVPB 1g daily  aspirin enteric coated 81 milliGRAM(s) Oral daily  atorvastatin 80 milliGRAM(s) Oral at bedtime  heparin  Injectable 5000 Unit(s) SubCutaneous every 8 hours  insulin glargine Injectable (LANTUS) 40 Unit(s) SubCutaneous at bedtime  insulin lispro (HumaLOG) corrective regimen sliding scale   SubCutaneous three times a day before meals  insulin lispro Injectable (HumaLOG) 13 Unit(s) SubCutaneous three times a day with meals  metoprolol succinate ER 25 milliGRAM(s) Oral daily  NIFEdipine XL 60 milliGRAM(s) Oral daily  predniSONE   Tablet 5 milliGRAM(s) Oral daily  tacrolimus 3 milliGRAM(s) Oral every 12 hours  ticagrelor 90 milliGRAM(s) Oral two times a day

## 2019-01-17 NOTE — MEDICAL STUDENT ADULT H&P (EDUCATION) - NS MD HP STUD ASPLAN ASSES FT
39 yo F with PMHx of renal transplant in 1999, DM, CAD s/p stent LAD 12/2018, presented with fever and cellulitis of right shoulder for 3 days. About 2-3 days prior to admission, the patient experienced subjective fevers with generalized weakness in addition to a small pimple on her right scapular region that was surrounded by erythema and warmth. Patient was admitted on 1/13/19 and was evaluated by surgery. It was determined that the patient had an abscess and proceed to undergo I&D on 1/14/19.
37 yo F with PMHx of renal transplant in 1999, DM, CAD s/p stent LAD 12/2018, presented with fever and cellulitis of right shoulder for 3 days. About 2-3 days prior to admission, the patient experienced subjective fevers with generalized weakness in addition to a small pimple on her right scapular region that was surrounded by erythema and warmth. Patient was admitted on 1/13/19 and was evaluated by surgery. It was determined that the patient had an abscess and proceed to undergo I&D on 1/14/19. Preliminary culture from abscess shows staph aureus.

## 2019-01-17 NOTE — MEDICAL STUDENT ADULT H&P (EDUCATION) - NS MD HP STUD PE SKIN FT
Right suprascapular wound with packing covered with foam dressing. No erythema or warmth surrounding dressing

## 2019-01-17 NOTE — PROGRESS NOTE ADULT - SUBJECTIVE AND OBJECTIVE BOX
pt seen in icu [  ], reg med floor [ x  ], bed [ x ], chair at bedside [   ]    Covering for Adarsh Don.  Awake ,alert ,lying in  bed in NAD. Reports that her pain is improving though still there. Denies nausea, vomiting, diarrhea, chest pain, SOB, headache.    REVIEW OF SYSTEMS:    CONSTITUTIONAL: No weakness, fevers or chills  EYES/ENT: No visual changes;  No vertigo or throat pain   NECK: No pain or stiffness  RESPIRATORY: No cough, wheezing, hemoptysis; No shortness of breath  CARDIOVASCULAR: No chest pain or palpitations  GASTROINTESTINAL: No abdominal or epigastric pain. No nausea, vomiting, or hematemesis; No diarrhea or constipation. No melena or hematochezia.  GENITOURINARY: No dysuria, frequency or hematuria  NEUROLOGICAL: No numbness or weakness  SKIN: No itching, burning, rashes, or lesions   All other review of systems is negative unless indicated above.    Physical Exam      Neuro :  no focal deficits  Respiratory: CTA B/L  CV: RRR, S1S2, no murmurs,   Abdominal: Soft, NT, ND +BS,  Extremities: No edema, + peripheral pulses, right suprascapular wound with packing cdi        Allergies  No Known Allergies      Health Issues  Cellulitis  CAD (coronary artery disease)  Kidney transplant recipient  DM (diabetes mellitus)  No pertinent past medical history  No significant past surgical history      Vitals  T(F): 97.8 (01-17-19 @ 08:29), Max: 97.9 (01-16-19 @ 15:59)  HR: 71 (01-17-19 @ 08:29) (65 - 71)  BP: 122/80 (01-17-19 @ 08:29) (121/86 - 153/53)  RR: 20 (01-17-19 @ 08:29) (20 - 20)  SpO2: 99% (01-17-19 @ 08:29) (99% - 100%)  Wt(kg): --  CAPILLARY BLOOD GLUCOSE      POCT Blood Glucose.: 174 mg/dL (17 Jan 2019 08:26)      Labs                          9.2    12.5  )-----------( 258      ( 17 Jan 2019 06:09 )             29.2       01-17    143  |  111<H>  |  21<H>  ----------------------------<  141<H>  3.8   |  22  |  1.77<H>    Ca    8.1<L>      17 Jan 2019 06:09              Radiology Results      Meds    MEDICATIONS  (STANDING):  aspirin enteric coated 81 milliGRAM(s) Oral daily  atorvastatin 80 milliGRAM(s) Oral at bedtime  ciprofloxacin     Tablet 500 milliGRAM(s) Oral every 24 hours  heparin  Injectable 5000 Unit(s) SubCutaneous every 8 hours  insulin glargine Injectable (LANTUS) 40 Unit(s) SubCutaneous at bedtime  insulin lispro (HumaLOG) corrective regimen sliding scale   SubCutaneous three times a day before meals  insulin lispro Injectable (HumaLOG) 13 Unit(s) SubCutaneous three times a day with meals  metoprolol succinate ER 25 milliGRAM(s) Oral daily  NIFEdipine XL 60 milliGRAM(s) Oral daily  predniSONE   Tablet 5 milliGRAM(s) Oral daily  sodium chloride 0.45%. 1000 milliLiter(s) (100 mL/Hr) IV Continuous <Continuous>  tacrolimus 3 milliGRAM(s) Oral every 12 hours  ticagrelor 90 milliGRAM(s) Oral two times a day  vancomycin  IVPB      vancomycin  IVPB 1000 milliGRAM(s) IV Intermittent every 24 hours      MEDICATIONS  (PRN):  acetaminophen   Tablet .. 650 milliGRAM(s) Oral every 6 hours PRN Mild Pain (1 - 3)  oxyCODONE    5 mG/acetaminophen 325 mG 1 Tablet(s) Oral every 4 hours PRN Moderate Pain (4 - 6)

## 2019-01-17 NOTE — PROGRESS NOTE ADULT - PROBLEM SELECTOR PLAN 8
IMPROVE VTE Individual Risk Assessment          RISK                                                          Points  [  ] Previous VTE                                                3  [  ] Thrombophilia                                             2  [  ] Lower limb paralysis                                   2        (unable to hold up >15 seconds)    [  ] Current Cancer                                             2         (within 6 months)  [ x ] Immobilization > 24 hrs                              1  [  ] ICU/CCU stay > 24 hours                             1  [  ] Age > 60                                                         1    IMPROVE VTE Score: 1  no need for chemical anticoagulation
on HSQ.
IMPROVE VTE Individual Risk Assessment          RISK                                                          Points  [  ] Previous VTE                                                3  [  ] Thrombophilia                                             2  [  ] Lower limb paralysis                                   2        (unable to hold up >15 seconds)    [  ] Current Cancer                                             2         (within 6 months)  [ x ] Immobilization > 24 hrs                              1  [  ] ICU/CCU stay > 24 hours                             1  [  ] Age > 60                                                         1    IMPROVE VTE Score: 1  Started HSQ
IMPROVE VTE Individual Risk Assessment          RISK                                                          Points  [  ] Previous VTE                                                3  [  ] Thrombophilia                                             2  [  ] Lower limb paralysis                                   2        (unable to hold up >15 seconds)    [  ] Current Cancer                                             2         (within 6 months)  [ x ] Immobilization > 24 hrs                              1  [  ] ICU/CCU stay > 24 hours                             1  [  ] Age > 60                                                         1    IMPROVE VTE Score: 1  no need for chemical anticoagulation

## 2019-01-17 NOTE — PROGRESS NOTE ADULT - PROBLEM SELECTOR PLAN 1
Presented with right shoulder erythema, swelling and pain.  Antibiotics  Panculture  Surgery follow up   ID follow up

## 2019-01-17 NOTE — PROGRESS NOTE ADULT - SUBJECTIVE AND OBJECTIVE BOX
Problem List:    PAST MEDICAL & SURGICAL HISTORY:  CAD (coronary artery disease)  Kidney transplant recipient  DM (diabetes mellitus)  No significant past surgical history      No Known Allergies      MEDICATIONS  (STANDING):  aspirin enteric coated 81 milliGRAM(s) Oral daily  atorvastatin 80 milliGRAM(s) Oral at bedtime  ciprofloxacin     Tablet 500 milliGRAM(s) Oral every 24 hours  heparin  Injectable 5000 Unit(s) SubCutaneous every 8 hours  insulin glargine Injectable (LANTUS) 40 Unit(s) SubCutaneous at bedtime  insulin lispro (HumaLOG) corrective regimen sliding scale   SubCutaneous three times a day before meals  insulin lispro Injectable (HumaLOG) 13 Unit(s) SubCutaneous three times a day with meals  metoprolol succinate ER 25 milliGRAM(s) Oral daily  NIFEdipine XL 60 milliGRAM(s) Oral daily  predniSONE   Tablet 5 milliGRAM(s) Oral daily  sodium chloride 0.45%. 1000 milliLiter(s) (100 mL/Hr) IV Continuous <Continuous>  tacrolimus 3 milliGRAM(s) Oral every 12 hours  ticagrelor 90 milliGRAM(s) Oral two times a day  vancomycin  IVPB      vancomycin  IVPB 1000 milliGRAM(s) IV Intermittent every 24 hours    MEDICATIONS  (PRN):  acetaminophen   Tablet .. 650 milliGRAM(s) Oral every 6 hours PRN Mild Pain (1 - 3)  oxyCODONE    5 mG/acetaminophen 325 mG 1 Tablet(s) Oral every 4 hours PRN Moderate Pain (4 - 6)                            9.2    12.5  )-----------( 258      ( 17 Jan 2019 06:09 )             29.2     01-17    143  |  111<H>  |  21<H>  ----------------------------<  141<H>  3.8   |  22  |  1.77<H>    Ca    8.1<L>      17 Jan 2019 06:09          Osmolality, Random Urine: 282 mos/kg (01-15 @ 06:18)  Sodium, Random Urine: 62 mmol/L (01-15 @ 06:17)  Creatinine, Random Urine: 52 mg/dL (01-15 @ 06:17)      REVIEW OF SYSTEMS:  General: no fever no chills, no weight loss.  EYES/ENT: No visual changes;  No vertigo, no headache.  NECK: No pain or stiffness  RESPIRATORY: No cough, wheezing, hemoptysis; No shortness of breath  CARDIOVASCULAR: No chest pain or palpitations. No Edema  GASTROINTESTINAL: No abdominal or epigastric pain. No nausea, vomiting. No diarrhea or constipation. No melena.  GENITOURINARY: No dysuria, frequency, foamy urine, urinary urgency, incontinence or hematuria  NEUROLOGICAL: No numbness or weakness, no tremor , no dizziness.   Muscle skeletal : no joint pain and no swelling of joints and limbs.  SKIN: No itching, burning, rashes.        VITALS:  T(F): 97.8 (01-17-19 @ 08:29), Max: 97.9 (01-16-19 @ 15:59)  HR: 71 (01-17-19 @ 08:29)  BP: 122/80 (01-17-19 @ 08:29)  RR: 20 (01-17-19 @ 08:29)  SpO2: 99% (01-17-19 @ 08:29)  Wt(kg): --      PHYSICAL EXAM:  Constitutional: well developed, no diaphoresis, no distress.  Neck: No JVD, no carotid bruit, supple, no adenopathy  Respiratory: Good air entrance B/L, no wheezes, rales or rhonchi  Cardiovascular: S1, S2, RRR, no pericardial rub, no murmur  Abdomen: BS+, soft, no tenderness, no bruit  Pelvis: bladder nondistended  Extremities: No cyanosis or clubbing. No peripheral edema.   Pulses: All present  Neurological: A/O x 3, no focal deficits  Psychiatric: Normal mood, normal affect  Skin: No rashes  Vascular Access: Problem List:  renal transplant living donor  MICHAEL NON OLIGURIC    Right kidney:  9.3 cm cm. The renal parenchyma is echogenic secondary to   medical renal disease. Renal cortical atrophy is present. A simple   midpole cyst measures 1.3 x 1.5 x 1.5 cm. No hydronephrosis or calculi P    Left kidney:  9.3 cm cm. The renal parenchyma is echogenic secondary to   medical renal disease. Renal cortical atrophy is present. A mid pole cyst   measures 1.1 x 0.8 x 1.0 cm. No hydronephrosis or calculi.    Renal transplant: Right lower quadrant. 15.0 x 5.8 x 6.0 cm. Minimal   right hydronephrosis. No calculi or masses are seen.    Urinary bladder: Incompletely distended. Unremarkable.    Aorta: Visualized portions are normal.    IMPRESSION:     Echogenic atrophic native kidneys.    Right lower quadrant renal transplant with minimal hydronephrosis.    PAST MEDICAL & SURGICAL HISTORY:  CAD (coronary artery disease)  Kidney transplant recipient  DM (diabetes mellitus)        No Known Allergies      MEDICATIONS  (STANDING):  aspirin enteric coated 81 milliGRAM(s) Oral daily  atorvastatin 80 milliGRAM(s) Oral at bedtime  ciprofloxacin     Tablet 500 milliGRAM(s) Oral every 24 hours  heparin  Injectable 5000 Unit(s) SubCutaneous every 8 hours  insulin glargine Injectable (LANTUS) 40 Unit(s) SubCutaneous at bedtime  insulin lispro (HumaLOG) corrective regimen sliding scale   SubCutaneous three times a day before meals  insulin lispro Injectable (HumaLOG) 13 Unit(s) SubCutaneous three times a day with meals  metoprolol succinate ER 25 milliGRAM(s) Oral daily  NIFEdipine XL 60 milliGRAM(s) Oral daily  predniSONE   Tablet 5 milliGRAM(s) Oral daily  sodium chloride 0.45%. 1000 milliLiter(s) (100 mL/Hr) IV Continuous <Continuous>  tacrolimus 3 milliGRAM(s) Oral every 12 hours  ticagrelor 90 milliGRAM(s) Oral two times a day  vancomycin  IVPB      vancomycin  IVPB 1000 milliGRAM(s) IV Intermittent every 24 hours    MEDICATIONS  (PRN):  acetaminophen   Tablet .. 650 milliGRAM(s) Oral every 6 hours PRN Mild Pain (1 - 3)  oxyCODONE    5 mG/acetaminophen 325 mG 1 Tablet(s) Oral every 4 hours PRN Moderate Pain (4 - 6)    Tacrolimus level, non tough was drawn levels are 18-19                          9.2    12.5  )-----------( 258      ( 17 Jan 2019 06:09 )             29.2     01-17    143  |  111<H>  |  21<H>  ----------------------------<  141<H>  3.8   |  22  |  1.77<H>    Ca    8.1<L>      17 Jan 2019 06:09      Tacrolimus (), Serum: 19.0: Tacrolimus testing is performed on the [employer]  by  chemiluminescent microparticle immunoassay. The therapeutic range of  tacrolimus is not clearly defined but target 12-hour trough whole blood  concentrations are 5.0 - 20.0 ng/mL early post transplant. Twenty-four  hour  trough concentrations are 33-50% less than the corresponding 12-hour  trough. ng/mL (01.16.19 @ 09:19)        Osmolality, Random Urine: 282 mos/kg (01-15 @ 06:18)  Sodium, Random Urine: 62 mmol/L (01-15 @ 06:17)  Creatinine, Random Urine: 52 mg/dL (01-15 @ 06:17)      REVIEW OF SYSTEMS:  General: no fever no chills, no weight loss.  EYES/ENT: No visual changes;  No vertigo, no headache.  NECK: No pain or stiffness  RESPIRATORY: No cough, wheezing, hemoptysis; No shortness of breath  CARDIOVASCULAR: No chest pain or palpitations. No Edema  GASTROINTESTINAL: No abdominal or epigastric pain. No nausea, vomiting. No diarrhea or constipation. No melena.  GENITOURINARY: No dysuria, frequency, foamy urine, urinary urgency, incontinence or hematuria  NEUROLOGICAL: No numbness or weakness, no tremor , no dizziness.   Muscle skeletal : no joint pain and no swelling of joints and limbs.  SKIN: No itching, burning, rashes.        VITALS:  T(F): 97.8 (01-17-19 @ 08:29), Max: 97.9 (01-16-19 @ 15:59)  HR: 71 (01-17-19 @ 08:29)  BP: 122/80 (01-17-19 @ 08:29)  RR: 20 (01-17-19 @ 08:29)  SpO2: 99% (01-17-19 @ 08:29)  Wt(kg): --      PHYSICAL EXAM:  Constitutional: well developed, no diaphoresis, no distress.  Neck: No JVD, no carotid bruit, supple, no adenopathy  Respiratory: Good air entrance B/L, no wheezes, rales or rhonchi  Cardiovascular: S1, S2, RRR, no pericardial rub, no murmur  Abdomen: BS+, soft, no tenderness, no bruit  Pelvis: bladder nondistended  Extremities: No cyanosis or clubbing. No peripheral edema.   Pulses: All present  Neurological: A/O x 3, no focal deficits  Psychiatric: Normal mood, normal affect

## 2019-01-17 NOTE — PROGRESS NOTE ADULT - NSHPATTENDINGPLANDISCUSS_GEN_ALL_CORE
signed out to covering surgical PA
the patient and medical team
the patient and the medical team
the patient

## 2019-01-17 NOTE — MEDICAL STUDENT ADULT H&P (EDUCATION) - NS MD HP STUD RESULTS RAD FT
Renal US IMPRESSION: Echogenic atrophic native kidneys. Right lower quadrant renal transplant with minimal hydronephrosis.

## 2019-01-20 LAB
CULTURE RESULTS: SIGNIFICANT CHANGE UP
ORGANISM # SPEC MICROSCOPIC CNT: SIGNIFICANT CHANGE UP
ORGANISM # SPEC MICROSCOPIC CNT: SIGNIFICANT CHANGE UP
SPECIMEN SOURCE: SIGNIFICANT CHANGE UP

## 2019-06-26 ENCOUNTER — INPATIENT (INPATIENT)
Facility: HOSPITAL | Age: 39
LOS: 0 days | Discharge: HOME | DRG: 760 | End: 2019-06-27
Attending: INTERNAL MEDICINE | Admitting: INTERNAL MEDICINE
Payer: COMMERCIAL

## 2019-06-26 VITALS
HEART RATE: 121 BPM | SYSTOLIC BLOOD PRESSURE: 112 MMHG | HEIGHT: 67 IN | DIASTOLIC BLOOD PRESSURE: 73 MMHG | RESPIRATION RATE: 18 BRPM | OXYGEN SATURATION: 98 % | TEMPERATURE: 98 F | WEIGHT: 240.08 LBS

## 2019-06-26 DIAGNOSIS — I25.10 ATHEROSCLEROTIC HEART DISEASE OF NATIVE CORONARY ARTERY WITHOUT ANGINA PECTORIS: ICD-10-CM

## 2019-06-26 DIAGNOSIS — N92.0 EXCESSIVE AND FREQUENT MENSTRUATION WITH REGULAR CYCLE: ICD-10-CM

## 2019-06-26 DIAGNOSIS — I20.0 UNSTABLE ANGINA: ICD-10-CM

## 2019-06-26 DIAGNOSIS — R42 DIZZINESS AND GIDDINESS: ICD-10-CM

## 2019-06-26 DIAGNOSIS — E11.9 TYPE 2 DIABETES MELLITUS WITHOUT COMPLICATIONS: ICD-10-CM

## 2019-06-26 DIAGNOSIS — Z94.0 KIDNEY TRANSPLANT STATUS: ICD-10-CM

## 2019-06-26 DIAGNOSIS — I20.9 ANGINA PECTORIS, UNSPECIFIED: ICD-10-CM

## 2019-06-26 DIAGNOSIS — R50.9 FEVER, UNSPECIFIED: ICD-10-CM

## 2019-06-26 DIAGNOSIS — Z29.9 ENCOUNTER FOR PROPHYLACTIC MEASURES, UNSPECIFIED: ICD-10-CM

## 2019-06-26 DIAGNOSIS — I10 ESSENTIAL (PRIMARY) HYPERTENSION: ICD-10-CM

## 2019-06-26 LAB
ALBUMIN SERPL ELPH-MCNC: 3 G/DL — LOW (ref 3.5–5)
ALP SERPL-CCNC: 111 U/L — SIGNIFICANT CHANGE UP (ref 40–120)
ALT FLD-CCNC: 14 U/L DA — SIGNIFICANT CHANGE UP (ref 10–60)
ANION GAP SERPL CALC-SCNC: 13 MMOL/L — SIGNIFICANT CHANGE UP (ref 5–17)
APPEARANCE UR: ABNORMAL
APTT BLD: 28.4 SEC — SIGNIFICANT CHANGE UP (ref 27.5–36.3)
AST SERPL-CCNC: 6 U/L — LOW (ref 10–40)
BILIRUB SERPL-MCNC: 0.5 MG/DL — SIGNIFICANT CHANGE UP (ref 0.2–1.2)
BILIRUB UR-MCNC: NEGATIVE — SIGNIFICANT CHANGE UP
BUN SERPL-MCNC: 23 MG/DL — HIGH (ref 7–18)
CALCIUM SERPL-MCNC: 8.2 MG/DL — LOW (ref 8.4–10.5)
CHLORIDE SERPL-SCNC: 108 MMOL/L — SIGNIFICANT CHANGE UP (ref 96–108)
CK MB BLD-MCNC: <2.9 % — SIGNIFICANT CHANGE UP (ref 0–3.5)
CK MB CFR SERPL CALC: <1 NG/ML — SIGNIFICANT CHANGE UP (ref 0–3.6)
CK SERPL-CCNC: 35 U/L — SIGNIFICANT CHANGE UP (ref 21–215)
CO2 SERPL-SCNC: 18 MMOL/L — LOW (ref 22–31)
COLOR SPEC: YELLOW — SIGNIFICANT CHANGE UP
CREAT SERPL-MCNC: 2.24 MG/DL — HIGH (ref 0.5–1.3)
DIFF PNL FLD: ABNORMAL
FERRITIN SERPL-MCNC: 55 NG/ML — SIGNIFICANT CHANGE UP (ref 15–150)
GLUCOSE BLDC GLUCOMTR-MCNC: 124 MG/DL — HIGH (ref 70–99)
GLUCOSE BLDC GLUCOMTR-MCNC: 167 MG/DL — HIGH (ref 70–99)
GLUCOSE BLDC GLUCOMTR-MCNC: 173 MG/DL — HIGH (ref 70–99)
GLUCOSE SERPL-MCNC: 228 MG/DL — HIGH (ref 70–99)
GLUCOSE UR QL: NEGATIVE — SIGNIFICANT CHANGE UP
HCG SERPL-ACNC: <1 MIU/ML — SIGNIFICANT CHANGE UP
HCT VFR BLD CALC: 27.7 % — LOW (ref 34.5–45)
HCT VFR BLD CALC: 29.1 % — LOW (ref 34.5–45)
HGB BLD-MCNC: 8.2 G/DL — LOW (ref 11.5–15.5)
HGB BLD-MCNC: 8.9 G/DL — LOW (ref 11.5–15.5)
INR BLD: 1.15 RATIO — SIGNIFICANT CHANGE UP (ref 0.88–1.16)
IRON SATN MFR SERPL: 14 UG/DL — LOW (ref 40–150)
IRON SATN MFR SERPL: 5 % — LOW (ref 15–50)
KETONES UR-MCNC: NEGATIVE — SIGNIFICANT CHANGE UP
LEUKOCYTE ESTERASE UR-ACNC: ABNORMAL
MCHC RBC-ENTMCNC: 24.6 PG — LOW (ref 27–34)
MCHC RBC-ENTMCNC: 25 PG — LOW (ref 27–34)
MCHC RBC-ENTMCNC: 29.6 GM/DL — LOW (ref 32–36)
MCHC RBC-ENTMCNC: 30.6 GM/DL — LOW (ref 32–36)
MCV RBC AUTO: 81.7 FL — SIGNIFICANT CHANGE UP (ref 80–100)
MCV RBC AUTO: 82.9 FL — SIGNIFICANT CHANGE UP (ref 80–100)
NITRITE UR-MCNC: NEGATIVE — SIGNIFICANT CHANGE UP
NRBC # BLD: 0 /100 WBCS — SIGNIFICANT CHANGE UP (ref 0–0)
NRBC # BLD: 0 /100 WBCS — SIGNIFICANT CHANGE UP (ref 0–0)
PH UR: 7 — SIGNIFICANT CHANGE UP (ref 5–8)
PLATELET # BLD AUTO: 234 K/UL — SIGNIFICANT CHANGE UP (ref 150–400)
PLATELET # BLD AUTO: 279 K/UL — SIGNIFICANT CHANGE UP (ref 150–400)
POTASSIUM SERPL-MCNC: 3.8 MMOL/L — SIGNIFICANT CHANGE UP (ref 3.5–5.3)
POTASSIUM SERPL-SCNC: 3.8 MMOL/L — SIGNIFICANT CHANGE UP (ref 3.5–5.3)
PROT SERPL-MCNC: 7.4 G/DL — SIGNIFICANT CHANGE UP (ref 6–8.3)
PROT UR-MCNC: 30 MG/DL
PROTHROM AB SERPL-ACNC: 12.8 SEC — SIGNIFICANT CHANGE UP (ref 10–12.9)
RBC # BLD: 3.34 M/UL — LOW (ref 3.8–5.2)
RBC # BLD: 3.56 M/UL — LOW (ref 3.8–5.2)
RBC # FLD: 14.5 % — SIGNIFICANT CHANGE UP (ref 10.3–14.5)
RBC # FLD: 14.6 % — HIGH (ref 10.3–14.5)
SODIUM SERPL-SCNC: 139 MMOL/L — SIGNIFICANT CHANGE UP (ref 135–145)
SP GR SPEC: 1 — LOW (ref 1.01–1.02)
TIBC SERPL-MCNC: 267 UG/DL — SIGNIFICANT CHANGE UP (ref 250–450)
TROPONIN I SERPL-MCNC: <0.015 NG/ML — SIGNIFICANT CHANGE UP (ref 0–0.04)
TROPONIN I SERPL-MCNC: <0.015 NG/ML — SIGNIFICANT CHANGE UP (ref 0–0.04)
UIBC SERPL-MCNC: 253 UG/DL — SIGNIFICANT CHANGE UP (ref 110–370)
UROBILINOGEN FLD QL: NEGATIVE — SIGNIFICANT CHANGE UP
WBC # BLD: 14.59 K/UL — HIGH (ref 3.8–10.5)
WBC # BLD: 9.63 K/UL — SIGNIFICANT CHANGE UP (ref 3.8–10.5)
WBC # FLD AUTO: 14.59 K/UL — HIGH (ref 3.8–10.5)
WBC # FLD AUTO: 9.63 K/UL — SIGNIFICANT CHANGE UP (ref 3.8–10.5)

## 2019-06-26 PROCEDURE — 99285 EMERGENCY DEPT VISIT HI MDM: CPT | Mod: 25

## 2019-06-26 PROCEDURE — 93010 ELECTROCARDIOGRAM REPORT: CPT

## 2019-06-26 PROCEDURE — 71045 X-RAY EXAM CHEST 1 VIEW: CPT | Mod: 26

## 2019-06-26 RX ORDER — TACROLIMUS 5 MG/1
3 CAPSULE ORAL EVERY 12 HOURS
Refills: 0 | Status: DISCONTINUED | OUTPATIENT
Start: 2019-06-26 | End: 2019-06-27

## 2019-06-26 RX ORDER — TICAGRELOR 90 MG/1
90 TABLET ORAL ONCE
Refills: 0 | Status: COMPLETED | OUTPATIENT
Start: 2019-06-26 | End: 2019-06-26

## 2019-06-26 RX ORDER — ATORVASTATIN CALCIUM 80 MG/1
80 TABLET, FILM COATED ORAL AT BEDTIME
Refills: 0 | Status: DISCONTINUED | OUTPATIENT
Start: 2019-06-26 | End: 2019-06-27

## 2019-06-26 RX ORDER — INSULIN GLARGINE 100 [IU]/ML
15 INJECTION, SOLUTION SUBCUTANEOUS AT BEDTIME
Refills: 0 | Status: DISCONTINUED | OUTPATIENT
Start: 2019-06-26 | End: 2019-06-27

## 2019-06-26 RX ORDER — TACROLIMUS 5 MG/1
3 CAPSULE ORAL ONCE
Refills: 0 | Status: COMPLETED | OUTPATIENT
Start: 2019-06-26 | End: 2019-06-26

## 2019-06-26 RX ORDER — TICAGRELOR 90 MG/1
90 TABLET ORAL EVERY 12 HOURS
Refills: 0 | Status: DISCONTINUED | OUTPATIENT
Start: 2019-06-26 | End: 2019-06-27

## 2019-06-26 RX ORDER — TICAGRELOR 90 MG/1
60 TABLET ORAL EVERY 12 HOURS
Refills: 0 | Status: DISCONTINUED | OUTPATIENT
Start: 2019-06-26 | End: 2019-06-26

## 2019-06-26 RX ORDER — INSULIN LISPRO 100/ML
VIAL (ML) SUBCUTANEOUS
Refills: 0 | Status: DISCONTINUED | OUTPATIENT
Start: 2019-06-26 | End: 2019-06-27

## 2019-06-26 RX ORDER — FERROUS SULFATE 325(65) MG
325 TABLET ORAL DAILY
Refills: 0 | Status: DISCONTINUED | OUTPATIENT
Start: 2019-06-26 | End: 2019-06-27

## 2019-06-26 RX ORDER — SODIUM CHLORIDE 9 MG/ML
1000 INJECTION INTRAMUSCULAR; INTRAVENOUS; SUBCUTANEOUS
Refills: 0 | Status: DISCONTINUED | OUTPATIENT
Start: 2019-06-26 | End: 2019-06-27

## 2019-06-26 RX ORDER — FOLIC ACID 0.8 MG
1 TABLET ORAL DAILY
Refills: 0 | Status: DISCONTINUED | OUTPATIENT
Start: 2019-06-26 | End: 2019-06-27

## 2019-06-26 RX ORDER — PANTOPRAZOLE SODIUM 20 MG/1
40 TABLET, DELAYED RELEASE ORAL
Refills: 0 | Status: DISCONTINUED | OUTPATIENT
Start: 2019-06-26 | End: 2019-06-27

## 2019-06-26 RX ORDER — ASPIRIN/CALCIUM CARB/MAGNESIUM 324 MG
81 TABLET ORAL DAILY
Refills: 0 | Status: DISCONTINUED | OUTPATIENT
Start: 2019-06-26 | End: 2019-06-27

## 2019-06-26 RX ORDER — SODIUM CHLORIDE 9 MG/ML
2000 INJECTION INTRAMUSCULAR; INTRAVENOUS; SUBCUTANEOUS ONCE
Refills: 0 | Status: COMPLETED | OUTPATIENT
Start: 2019-06-26 | End: 2019-06-26

## 2019-06-26 RX ORDER — METOPROLOL TARTRATE 50 MG
12.5 TABLET ORAL
Refills: 0 | Status: DISCONTINUED | OUTPATIENT
Start: 2019-06-26 | End: 2019-06-27

## 2019-06-26 RX ADMIN — TACROLIMUS 3 MILLIGRAM(S): 5 CAPSULE ORAL at 13:12

## 2019-06-26 RX ADMIN — SODIUM CHLORIDE 100 MILLILITER(S): 9 INJECTION INTRAMUSCULAR; INTRAVENOUS; SUBCUTANEOUS at 21:26

## 2019-06-26 RX ADMIN — Medication 5 MILLIGRAM(S): at 11:21

## 2019-06-26 RX ADMIN — SODIUM CHLORIDE 2000 MILLILITER(S): 9 INJECTION INTRAMUSCULAR; INTRAVENOUS; SUBCUTANEOUS at 05:40

## 2019-06-26 RX ADMIN — SODIUM CHLORIDE 100 MILLILITER(S): 9 INJECTION INTRAMUSCULAR; INTRAVENOUS; SUBCUTANEOUS at 11:20

## 2019-06-26 RX ADMIN — Medication 1: at 21:26

## 2019-06-26 RX ADMIN — TICAGRELOR 90 MILLIGRAM(S): 90 TABLET ORAL at 17:42

## 2019-06-26 RX ADMIN — Medication 81 MILLIGRAM(S): at 11:21

## 2019-06-26 RX ADMIN — TACROLIMUS 3 MILLIGRAM(S): 5 CAPSULE ORAL at 17:42

## 2019-06-26 RX ADMIN — Medication 1: at 16:24

## 2019-06-26 RX ADMIN — INSULIN GLARGINE 15 UNIT(S): 100 INJECTION, SOLUTION SUBCUTANEOUS at 21:26

## 2019-06-26 RX ADMIN — ATORVASTATIN CALCIUM 80 MILLIGRAM(S): 80 TABLET, FILM COATED ORAL at 21:17

## 2019-06-26 RX ADMIN — TICAGRELOR 90 MILLIGRAM(S): 90 TABLET ORAL at 13:11

## 2019-06-26 RX ADMIN — Medication 12.5 MILLIGRAM(S): at 17:41

## 2019-06-26 NOTE — CONSULT NOTE ADULT - ASSESSMENT
38 year old  with symptomatic anemia due to vaginal bleeding, bleeding minimal at this time   -trend CBCs, stable (baseline Hb ~9-10)  -pad checks   -please obtain pelvic ultrasound   -will continue to follow   -case d/w Dr. Andrade

## 2019-06-26 NOTE — H&P ADULT - PROBLEM SELECTOR PLAN 1
Fall and dizziness   BP laying 105/69 (100) and standing 97/59 (115)  Started IV hydration   D/c home Nifedipine and lower dose of metoprolol

## 2019-06-26 NOTE — ED ADULT NURSE NOTE - NSIMPLEMENTINTERV_GEN_ALL_ED
Implemented All Fall Risk Interventions:  [place] to call system. Call bell, personal items and telephone within reach. Instruct patient to call for assistance. Room bathroom lighting operational. Non-slip footwear when patient is off stretcher. Physically safe environment: no spills, clutter or unnecessary equipment. Stretcher in lowest position, wheels locked, appropriate side rails in place. Provide visual cue, wrist band, yellow gown, etc. Monitor gait and stability. Monitor for mental status changes and reorient to person, place, and time. Review medications for side effects contributing to fall risk. Reinforce activity limits and safety measures with patient and family.

## 2019-06-26 NOTE — CONSULT NOTE ADULT - SUBJECTIVE AND OBJECTIVE BOX
HISTORY OF PRESENT ILLNESS: HPI:  38 Female with PMH of Renal transplant (1999), Diabetes, CAD (s/p stent LAD 12/2018 in [place] by Peter Herndon), CKD came to hospital for dizziness, feeling sick, fever and heavy menstrual bleeding for 3 days. Pt went to work on Monday and started feeling nauseous. After coming back she noticed increased menstrual bleeding and weakness. Pt stated her menstrual bleeding has always been heavy since Dec 2018 when she started ASA and Brilinta after stent placement. There was also no water access all day so she kept holding her urine. Then she started feeling low grade fevers and her sickness progressed. She has not been able to properly eat and drink. Yesterday she had heavy menses in toilet and upon standing she had blurry vision and fell on the floor without loss of conciseness. Mild chest pain during the episode. No shortness of breath, headache, abdominal pain, problems with urine or bowel.     SH: lives alone, Works in [place]. No smoking and alcohol.      ED Course: Hemodynamically stable. EKG showed sinus tachy 115 bpm. Labs showed hb of 8.9 and Cr of 2.24. Cardiac enzymes were normal. CXR was clear. (26 Jun 2019 09:59)      PAST MEDICAL & SURGICAL HISTORY:  CAD (coronary artery disease)  Kidney transplant recipient  DM (diabetes mellitus)  No significant past surgical history          MEDICATIONS:  MEDICATIONS  (STANDING):  aspirin enteric coated 81 milliGRAM(s) Oral daily  atorvastatin 80 milliGRAM(s) Oral at bedtime  ferrous    sulfate 325 milliGRAM(s) Oral daily  folic acid 1 milliGRAM(s) Oral daily  insulin glargine Injectable (LANTUS) 15 Unit(s) SubCutaneous at bedtime  insulin lispro (HumaLOG) corrective regimen sliding scale   SubCutaneous Before meals and at bedtime  metoprolol tartrate 12.5 milliGRAM(s) Oral two times a day  pantoprazole    Tablet 40 milliGRAM(s) Oral before breakfast  predniSONE   Tablet 5 milliGRAM(s) Oral daily  sodium chloride 0.9%. 1000 milliLiter(s) (100 mL/Hr) IV Continuous <Continuous>  tacrolimus 3 milliGRAM(s) Oral every 12 hours  ticagrelor 90 milliGRAM(s) Oral every 12 hours      Allergies    No Known Allergies    Intolerances        FAMILY HISTORY:  No pertinent family history in first degree relatives    Non-contributary for premature coronary disease or sudden cardiac death    SOCIAL HISTORY:    [ X] Non-smoker  [ ] Smoker  [ ] Alcohol      REVIEW OF SYSTEMS:  [ ]chest pain  [  ]shortness of breath  [  ]palpitations  [  ]syncope  [ ]near syncope [ ]upper extremity weakness   [ ] lower extremity weakness  [  ]diplopia  [  ]altered mental status   [  ]fevers  [ ]chills [ ]nausea  [ ]vomitting  [  ]dysphagia    [ ]abdominal pain  [ ]melena  [ ]BRBPR    [  ]epistaxis  [  ]rash    [ ]lower extremity edema        [x ] All others negative	  [ ] Unable to obtain      LABS:	 	    CARDIAC MARKERS:  CARDIAC MARKERS ( 26 Jun 2019 12:51 )  <0.015 ng/mL / x     / 35 U/L / x     / <1.0 ng/mL  CARDIAC MARKERS ( 26 Jun 2019 05:54 )  <0.015 ng/mL / x     / x     / x     / x                                  8.2    9.63  )-----------( 234      ( 26 Jun 2019 12:52 )             27.7     Hb Trend: 8.2<--, 8.9<--    06-26    139  |  108  |  23<H>  ----------------------------<  228<H>  3.8   |  18<L>  |  2.24<H>    Ca    8.2<L>      26 Jun 2019 05:54    TPro  7.4  /  Alb  3.0<L>  /  TBili  0.5  /  DBili  x   /  AST  6<L>  /  ALT  14  /  AlkPhos  111  06-26    Creatinine Trend: 2.24<--    Coags:  PT/INR - ( 26 Jun 2019 05:54 )   PT: 12.8 sec;   INR: 1.15 ratio         PTT - ( 26 Jun 2019 05:54 )  PTT:28.4 sec        PHYSICAL EXAM:  T(C): 36.9 (06-26-19 @ 11:23), Max: 36.9 (06-26-19 @ 05:10)  HR: 91 (06-26-19 @ 11:23) (91 - 121)  BP: 112/71 (06-26-19 @ 11:23) (105/67 - 141/88)  RR: 18 (06-26-19 @ 11:23) (9 - 18)  SpO2: 98% (06-26-19 @ 11:23) (98% - 100%)  Wt(kg): --  I&O's Summary      Gen: Appears well in NAD  HEENT:  (-)icterus (-)pallor  CV: N S1 S2 1/6 RUBENS (+)2 Pulses B/l  Resp:  Clear to ausculatation B/L, normal effort  GI: (+) BS Soft, NT, ND  Lymph:  (-)Edema, (-)obvious lymphadenopathy  Skin: Warm to touch, Normal turgor  Psych: Appropriate mood and affect        TELEMETRY: 	  Sinus     ECG:  	Sinus Tachycardia 115 BPM    RADIOLOGY:         CXR:  No infiltrate     ASSESSMENT/PLAN: 	38y Female PMH of Renal transplant (1999), Diabetes, CAD (s/p stent LAD 12/2018 in [place] by Peter Herndon), CKD came to hospital for dizziness, feeling sick, fever and heavy menstrual bleeding for 3 days    - CE negative thus far  - Echo  - Please obtain records from [facility]   - Cont ASA and Brilinta for now given PCI <12 months ago  - will follow with you    I once again thank you for allowing me to participate in the care of your patient.  If you have any questions or concerns please do not hesitate to contact me.    Noel Quintanilla MD, [facility]  BEEPER [phone]
38 year old  presents to ED with complaint of chest pain and dizziness at 4am today. Pt has been having increased vaginal bleeding with clots since Friday, using several pads a day, unsure of amount. Pt states she had stent placed in 2018 and was started on ASA/Brillinta and has been having heavier periods since then. Pt states periods were lighter in May and , LMP lasting 6/10 - 6/ with normal flow. However, bleeding since friday has been heavier than previous months. Pt denies lower abdominal pain, n/v/d, fever, chills.     OBGYN: does not have OB, had insurance issues  CS x 1, denies sab or top   hx of irregular menses prior to starting ASA/Brillinta, skipping 2-3 months at a time with light flow   stent placed in 2018 and was started on ASA/Brillinta and has been having heavier periods since then. Pt states periods were lighter in May and , LMP lasting 6/10 - 6/ with normal flow.   denies ovarian cysts, fibroids, stds     PAST MEDICAL & SURGICAL HISTORY:  CAD (coronary artery disease)  Kidney transplant recipient  DM (diabetes mellitus)  No significant past surgical history    Allergies  No Known Allergies    SOCIAL HISTORY: denies x 3     FAMILY HISTORY:  No pertinent family history in first degree relatives      Vital Signs Last 24 Hrs  T(C): 36.9 (2019 05:10), Max: 36.9 (2019 05:10)  T(F): 98.4 (2019 05:10), Max: 98.4 (2019 05:10)  HR: 118 (2019 06:19) (98 - 121)  BP: 141/88 (2019 06:19) (105/67 - 141/88)  BP(mean): --  RR: 9 (2019 06:19) (9 - 18)  SpO2: 100% (2019 06:19) (98% - 100%)    PHYSICAL EXAM:    Gen: A&O x 3, NAD  Abdomen: +BS; soft; Nontender, nondistended, no rebound or guarding   Gyn: minimal bleeding, no pooling, no CMT, no AT, no abnormal masses     LABS:                        8.9    14.59 )-----------( 279      ( 2019 05:54 )             29.1     06-26    139  |  108  |  23<H>  ----------------------------<  228<H>  3.8   |  18<L>  |  2.24<H>    Ca    8.2<L>      2019 05:54    TPro  7.4  /  Alb  3.0<L>  /  TBili  0.5  /  DBili  x   /  AST  6<L>  /  ALT  14  /  AlkPhos  111      PT/INR - ( 2019 05:54 )   PT: 12.8 sec;   INR: 1.15 ratio         PTT - ( 2019 05:54 )  PTT:28.4 sec      RADIOLOGY & ADDITIONAL STUDIES:

## 2019-06-26 NOTE — ED ADULT TRIAGE NOTE - CHIEF COMPLAINT QUOTE
C/O chest pain.fever,vaginal bleeding, had last tylenol taken at 3 am, had menstruation 2 weeks ago as per patient

## 2019-06-26 NOTE — H&P ADULT - NSHPPHYSICALEXAM_GEN_ALL_CORE
Vital Signs Last 24 Hrs  T(C): 36.7 (26 Jun 2019 07:14), Max: 36.9 (26 Jun 2019 05:10)  T(F): 98.1 (26 Jun 2019 07:14), Max: 98.4 (26 Jun 2019 05:10)  HR: 97 (26 Jun 2019 07:14) (97 - 121)  BP: 115/74 (26 Jun 2019 07:14) (105/67 - 141/88)  RR: 18 (26 Jun 2019 07:14) (9 - 18)  SpO2: 100% (26 Jun 2019 07:14) (98% - 100%)  .  GENERAL: Well developed, Young Obese female, NAD  HEENT:  Normocephalic/Atraumatic, reactive light reflex, moist mucous membranes  NECK: Supple, no JVD  RESP: Symmetric movement of the chest, clear to auscultation bilaterally  CVS: S1 and S2 audible, no murmur, rubs or gallops noted  GI: Normal active bowel sounds present, abdomen soft, non tender, non distended  EXTREMITIES:  No edema, no clubbing, cyanosis   MSK: 5/5 strength bilateral upper and lower extremities, intact sensations to light & crude touch  PSYCH: Normal mood, normal affect observed    NEURO: Alert and oriented x 3

## 2019-06-26 NOTE — H&P ADULT - ASSESSMENT
38 Female with PMH of Renal transplant (1999), Diabetes, CAD (s/p stent LAD 2018 in [place]), CKD came to hospital for dizziness, feeling sick, fever and heavy menstrual bleeding for 3 days.

## 2019-06-26 NOTE — H&P ADULT - PROBLEM SELECTOR PLAN 2
Fever of 102 at home  Leukocytosis of 14  No urinary complaints, cough, shortness of breath, diarrhea or skin rashes  F/u Blood Cx  Holding antibiotics

## 2019-06-26 NOTE — ED PROVIDER NOTE - CLINICAL SUMMARY MEDICAL DECISION MAKING FREE TEXT BOX
39yo F with hx HTN, IDDM, s/p renal transplant in 1999, CAD with stent on ASA and Brillinta presents with dizziness, chest pain and vaginal bleeding. Will obtain EKG, labs, CXR.  GYN consulted emergently for vaginal bleeding-evaluated patient, reports bleeding not significant at this time, will discuss with attending and make further recs. 37yo F with hx HTN, IDDM, s/p renal transplant in 1999, CAD with stent on ASA and Brillinta presents with dizziness, chest pain and vaginal bleeding. Will obtain EKG, labs, CXR.  GYN consulted emergently for vaginal bleeding-evaluated patient, reports bleeding not significant at this time, will discuss with attending and make further recs.    labs show neg troponin, H/H 8.9/29 (previously 9.2/29) CXR unremarkable  discussed above with patient, pt agrees with plan for admission for cardiac telemonitoring

## 2019-06-26 NOTE — H&P ADULT - PROBLEM SELECTOR PLAN 4
Atypical chest pain , resolved  EKG: Sinus tachycardia  Normal cardiac enzymes   Ordered Echocardiogram and tele monitoring   Low wells score

## 2019-06-26 NOTE — ED ADULT TRIAGE NOTE - RESPIRATORY RATE (BREATHS/MIN)
No pertinent family history in first degree relatives 18 No pertinent family history in first degree relatives, Unknown maternal family hx (mother adopted)

## 2019-06-26 NOTE — ED ADULT NURSE REASSESSMENT NOTE - NS ED NURSE REASSESS COMMENT FT1
pt endorsed to RN Shakira in [facility]. Pt being transported accompanied by RN and transsport. A/ox3, IV intact, cardiac monitor at bedside.

## 2019-06-26 NOTE — PATIENT PROFILE ADULT - NSPROEDALEARNPREF_GEN_A_NUR
verbal instruction/individual instruction individual instruction/verbal instruction/written material

## 2019-06-26 NOTE — ED PROVIDER NOTE - OBJECTIVE STATEMENT
39yo F with hx HTN, IDDM, s/p renal transplant in 1999, CAD with stent on ASA and Brillinta presents with dizziness, chest pain and vaginal bleeding. Reports she had her LMP 2 weeks ago. yesterday started bleeding again and at 9pm started having severe bleeding, passing clots. Reports that from 9pm to this AM she changed >8 pads. Reports that at 4am she went to bathroom and when she stood up felt so dizzy she fell, denies head trauma or LOC. Reports she also started having chest pain at that time. Denies vomiting, diarrhea, denies abdominal pain.

## 2019-06-26 NOTE — H&P ADULT - NSICDXPASTMEDICALHX_GEN_ALL_CORE_FT
PAST MEDICAL HISTORY:  CAD (coronary artery disease)     DM (diabetes mellitus)     Kidney transplant recipient

## 2019-06-26 NOTE — H&P ADULT - HISTORY OF PRESENT ILLNESS
38 Female with PMH of Renal transplant (1999), Diabetes, CAD (s/p stent LAD 2018 in [place]), CKD came to hospital for dizziness, feeling sick, fever and heavy menstrual bleeding for 3 days. Pt went to work on Monday and started feeling nauseous. After coming back she noticed increased menstrual bleeding and weakness. Pt stated her menstrual bleeding has always been heavy since Dec 2018 when she started ASA and Brilinta after stent placement. There was also no water access all day so she kept holding her urine. Then she started feeling low grade fevers and her sickness progressed. She has not been able to properly eat and drink. Yesterday she had heavy menses in toilet and upon standing she had blurry vision and fell on the floor without loss of conciseness. Mild chest pain during the episode. No shortness of breath, headache, abdominal pain, problems with urine or bowel.     SH: lives alone, Works in [employer]. No smoking and alcohol.      ED Course: Hemodynamically stable. EKG showed sinus tachy 115 bpm. Labs showed hb of 8.9 and Cr of 2.24. Cardiac enzymes were normal. CXR was clear.

## 2019-06-26 NOTE — H&P ADULT - PROBLEM SELECTOR PLAN 3
Heavy bleeding  Baseline Hb of 10, presented with 8.9   F/u Anemia panel  Goal Hb > 8  Trend CBC q8  Ordered Pelvic USG  Gyne on board Heavy bleeding  Baseline Hb of 10, presented with 8.9   F/u Anemia panel  Goal Hb > 8  Trend CBC q8  Started FeSO4 and Folic acid   Ordered Pelvic USG  Gyne on board

## 2019-06-27 ENCOUNTER — TRANSCRIPTION ENCOUNTER (OUTPATIENT)
Age: 39
End: 2019-06-27

## 2019-06-27 VITALS
RESPIRATION RATE: 18 BRPM | DIASTOLIC BLOOD PRESSURE: 66 MMHG | SYSTOLIC BLOOD PRESSURE: 100 MMHG | OXYGEN SATURATION: 99 % | HEART RATE: 72 BPM | TEMPERATURE: 98 F

## 2019-06-27 LAB
ALBUMIN SERPL ELPH-MCNC: 2.8 G/DL — LOW (ref 3.5–5)
ALP SERPL-CCNC: 90 U/L — SIGNIFICANT CHANGE UP (ref 40–120)
ALT FLD-CCNC: 12 U/L DA — SIGNIFICANT CHANGE UP (ref 10–60)
ANION GAP SERPL CALC-SCNC: 7 MMOL/L — SIGNIFICANT CHANGE UP (ref 5–17)
AST SERPL-CCNC: 7 U/L — LOW (ref 10–40)
BASOPHILS # BLD AUTO: 0.04 K/UL — SIGNIFICANT CHANGE UP (ref 0–0.2)
BASOPHILS NFR BLD AUTO: 0.4 % — SIGNIFICANT CHANGE UP (ref 0–2)
BILIRUB SERPL-MCNC: 0.3 MG/DL — SIGNIFICANT CHANGE UP (ref 0.2–1.2)
BUN SERPL-MCNC: 18 MG/DL — SIGNIFICANT CHANGE UP (ref 7–18)
CALCIUM SERPL-MCNC: 8.3 MG/DL — LOW (ref 8.4–10.5)
CHLORIDE SERPL-SCNC: 114 MMOL/L — HIGH (ref 96–108)
CHOLEST SERPL-MCNC: 102 MG/DL — SIGNIFICANT CHANGE UP (ref 10–199)
CK MB BLD-MCNC: <3.2 % — SIGNIFICANT CHANGE UP (ref 0–3.5)
CK MB CFR SERPL CALC: <1 NG/ML — SIGNIFICANT CHANGE UP (ref 0–3.6)
CK SERPL-CCNC: 31 U/L — SIGNIFICANT CHANGE UP (ref 21–215)
CO2 SERPL-SCNC: 20 MMOL/L — LOW (ref 22–31)
CREAT SERPL-MCNC: 1.7 MG/DL — HIGH (ref 0.5–1.3)
EOSINOPHIL # BLD AUTO: 0.12 K/UL — SIGNIFICANT CHANGE UP (ref 0–0.5)
EOSINOPHIL NFR BLD AUTO: 1.3 % — SIGNIFICANT CHANGE UP (ref 0–6)
FOLATE SERPL-MCNC: 5.2 NG/ML — SIGNIFICANT CHANGE UP
GLUCOSE BLDC GLUCOMTR-MCNC: 148 MG/DL — HIGH (ref 70–99)
GLUCOSE BLDC GLUCOMTR-MCNC: 166 MG/DL — HIGH (ref 70–99)
GLUCOSE BLDC GLUCOMTR-MCNC: 259 MG/DL — HIGH (ref 70–99)
GLUCOSE SERPL-MCNC: 137 MG/DL — HIGH (ref 70–99)
HBA1C BLD-MCNC: 6.8 % — HIGH (ref 4–5.6)
HCT VFR BLD CALC: 27.6 % — LOW (ref 34.5–45)
HDLC SERPL-MCNC: 46 MG/DL — LOW
HGB BLD-MCNC: 8.4 G/DL — LOW (ref 11.5–15.5)
IMM GRANULOCYTES NFR BLD AUTO: 0.4 % — SIGNIFICANT CHANGE UP (ref 0–1.5)
LIPID PNL WITH DIRECT LDL SERPL: 24 MG/DL — SIGNIFICANT CHANGE UP
LYMPHOCYTES # BLD AUTO: 2.56 K/UL — SIGNIFICANT CHANGE UP (ref 1–3.3)
LYMPHOCYTES # BLD AUTO: 27.1 % — SIGNIFICANT CHANGE UP (ref 13–44)
MAGNESIUM SERPL-MCNC: 1.5 MG/DL — LOW (ref 1.6–2.6)
MCHC RBC-ENTMCNC: 24.9 PG — LOW (ref 27–34)
MCHC RBC-ENTMCNC: 30.4 GM/DL — LOW (ref 32–36)
MCV RBC AUTO: 81.7 FL — SIGNIFICANT CHANGE UP (ref 80–100)
MONOCYTES # BLD AUTO: 0.66 K/UL — SIGNIFICANT CHANGE UP (ref 0–0.9)
MONOCYTES NFR BLD AUTO: 7 % — SIGNIFICANT CHANGE UP (ref 2–14)
NEUTROPHILS # BLD AUTO: 6.03 K/UL — SIGNIFICANT CHANGE UP (ref 1.8–7.4)
NEUTROPHILS NFR BLD AUTO: 63.8 % — SIGNIFICANT CHANGE UP (ref 43–77)
NRBC # BLD: 0 /100 WBCS — SIGNIFICANT CHANGE UP (ref 0–0)
PHOSPHATE SERPL-MCNC: 2.7 MG/DL — SIGNIFICANT CHANGE UP (ref 2.5–4.5)
PLATELET # BLD AUTO: 254 K/UL — SIGNIFICANT CHANGE UP (ref 150–400)
POTASSIUM SERPL-MCNC: 3.8 MMOL/L — SIGNIFICANT CHANGE UP (ref 3.5–5.3)
POTASSIUM SERPL-SCNC: 3.8 MMOL/L — SIGNIFICANT CHANGE UP (ref 3.5–5.3)
PROT SERPL-MCNC: 6.7 G/DL — SIGNIFICANT CHANGE UP (ref 6–8.3)
RBC # BLD: 3.38 M/UL — LOW (ref 3.8–5.2)
RBC # FLD: 14.7 % — HIGH (ref 10.3–14.5)
SODIUM SERPL-SCNC: 141 MMOL/L — SIGNIFICANT CHANGE UP (ref 135–145)
TOTAL CHOLESTEROL/HDL RATIO MEASUREMENT: 2.2 RATIO — LOW (ref 3.3–7.1)
TRIGL SERPL-MCNC: 160 MG/DL — HIGH (ref 10–149)
TROPONIN I SERPL-MCNC: <0.015 NG/ML — SIGNIFICANT CHANGE UP (ref 0–0.04)
TSH SERPL-MCNC: 0.62 UU/ML — SIGNIFICANT CHANGE UP (ref 0.34–4.82)
VIT B12 SERPL-MCNC: 384 PG/ML — SIGNIFICANT CHANGE UP (ref 232–1245)
WBC # BLD: 9.45 K/UL — SIGNIFICANT CHANGE UP (ref 3.8–10.5)
WBC # FLD AUTO: 9.45 K/UL — SIGNIFICANT CHANGE UP (ref 3.8–10.5)

## 2019-06-27 PROCEDURE — 81001 URINALYSIS AUTO W/SCOPE: CPT

## 2019-06-27 PROCEDURE — 36415 COLL VENOUS BLD VENIPUNCTURE: CPT

## 2019-06-27 PROCEDURE — 85027 COMPLETE CBC AUTOMATED: CPT

## 2019-06-27 PROCEDURE — 93005 ELECTROCARDIOGRAM TRACING: CPT

## 2019-06-27 PROCEDURE — 71045 X-RAY EXAM CHEST 1 VIEW: CPT

## 2019-06-27 PROCEDURE — 83550 IRON BINDING TEST: CPT

## 2019-06-27 PROCEDURE — 99285 EMERGENCY DEPT VISIT HI MDM: CPT | Mod: 25

## 2019-06-27 PROCEDURE — 85730 THROMBOPLASTIN TIME PARTIAL: CPT

## 2019-06-27 PROCEDURE — 80053 COMPREHEN METABOLIC PANEL: CPT

## 2019-06-27 PROCEDURE — 84443 ASSAY THYROID STIM HORMONE: CPT

## 2019-06-27 PROCEDURE — 82962 GLUCOSE BLOOD TEST: CPT

## 2019-06-27 PROCEDURE — 93306 TTE W/DOPPLER COMPLETE: CPT

## 2019-06-27 PROCEDURE — 86850 RBC ANTIBODY SCREEN: CPT

## 2019-06-27 PROCEDURE — 82553 CREATINE MB FRACTION: CPT

## 2019-06-27 PROCEDURE — A9502: CPT

## 2019-06-27 PROCEDURE — 82607 VITAMIN B-12: CPT

## 2019-06-27 PROCEDURE — 85610 PROTHROMBIN TIME: CPT

## 2019-06-27 PROCEDURE — 84702 CHORIONIC GONADOTROPIN TEST: CPT

## 2019-06-27 PROCEDURE — 82550 ASSAY OF CK (CPK): CPT

## 2019-06-27 PROCEDURE — 82728 ASSAY OF FERRITIN: CPT

## 2019-06-27 PROCEDURE — 83735 ASSAY OF MAGNESIUM: CPT

## 2019-06-27 PROCEDURE — 86901 BLOOD TYPING SEROLOGIC RH(D): CPT

## 2019-06-27 PROCEDURE — 83036 HEMOGLOBIN GLYCOSYLATED A1C: CPT

## 2019-06-27 PROCEDURE — 86900 BLOOD TYPING SEROLOGIC ABO: CPT

## 2019-06-27 PROCEDURE — 84484 ASSAY OF TROPONIN QUANT: CPT

## 2019-06-27 PROCEDURE — 82746 ASSAY OF FOLIC ACID SERUM: CPT

## 2019-06-27 PROCEDURE — 84100 ASSAY OF PHOSPHORUS: CPT

## 2019-06-27 PROCEDURE — 87040 BLOOD CULTURE FOR BACTERIA: CPT

## 2019-06-27 PROCEDURE — 83540 ASSAY OF IRON: CPT

## 2019-06-27 PROCEDURE — 78452 HT MUSCLE IMAGE SPECT MULT: CPT

## 2019-06-27 PROCEDURE — 80061 LIPID PANEL: CPT

## 2019-06-27 PROCEDURE — 93017 CV STRESS TEST TRACING ONLY: CPT

## 2019-06-27 RX ORDER — FOLIC ACID 0.8 MG
1 TABLET ORAL
Qty: 30 | Refills: 0
Start: 2019-06-27 | End: 2019-07-26

## 2019-06-27 RX ORDER — PANTOPRAZOLE SODIUM 20 MG/1
1 TABLET, DELAYED RELEASE ORAL
Qty: 30 | Refills: 0
Start: 2019-06-27 | End: 2019-07-26

## 2019-06-27 RX ORDER — NIFEDIPINE 30 MG
1 TABLET, EXTENDED RELEASE 24 HR ORAL
Qty: 0 | Refills: 0 | DISCHARGE

## 2019-06-27 RX ORDER — FERROUS SULFATE 325(65) MG
1 TABLET ORAL
Qty: 30 | Refills: 0
Start: 2019-06-27 | End: 2019-07-26

## 2019-06-27 RX ORDER — PANTOPRAZOLE SODIUM 20 MG/1
1 TABLET, DELAYED RELEASE ORAL
Qty: 0 | Refills: 0 | DISCHARGE

## 2019-06-27 RX ADMIN — TACROLIMUS 3 MILLIGRAM(S): 5 CAPSULE ORAL at 05:28

## 2019-06-27 RX ADMIN — Medication 1 MILLIGRAM(S): at 12:26

## 2019-06-27 RX ADMIN — Medication 325 MILLIGRAM(S): at 12:26

## 2019-06-27 RX ADMIN — Medication 1: at 17:08

## 2019-06-27 RX ADMIN — Medication 5 MILLIGRAM(S): at 05:28

## 2019-06-27 RX ADMIN — TICAGRELOR 90 MILLIGRAM(S): 90 TABLET ORAL at 05:28

## 2019-06-27 RX ADMIN — PANTOPRAZOLE SODIUM 40 MILLIGRAM(S): 20 TABLET, DELAYED RELEASE ORAL at 05:29

## 2019-06-27 RX ADMIN — Medication 81 MILLIGRAM(S): at 16:59

## 2019-06-27 RX ADMIN — TICAGRELOR 90 MILLIGRAM(S): 90 TABLET ORAL at 17:07

## 2019-06-27 RX ADMIN — Medication 3: at 12:25

## 2019-06-27 RX ADMIN — TACROLIMUS 3 MILLIGRAM(S): 5 CAPSULE ORAL at 17:07

## 2019-06-27 RX ADMIN — Medication 12.5 MILLIGRAM(S): at 05:28

## 2019-06-27 NOTE — DISCHARGE NOTE NURSING/CASE MANAGEMENT/SOCIAL WORK - NSDCDPATPORTLINK_GEN_ALL_CORE
You can access the [facility], offered by [facility], by registering with the following website: [email or web address]

## 2019-06-27 NOTE — DISCHARGE NOTE PROVIDER - NSDCCPCAREPLAN_GEN_ALL_CORE_FT
PRINCIPAL DISCHARGE DIAGNOSIS  Diagnosis: Chest pain  Assessment and Plan of Treatment: You presented with chest pain and nesr syncopal episode, given your history of coronary artery disease, there was concern of cardiac chest pain. Cardiologist was consulted, ECHO and stress test were done which came normal. Chest pain could be secondary to gastritis, you are given protonix. Continue taking as prescribed and follow up with gastroentrologist as out-patient. Follow up with primary medical doctor.      SECONDARY DISCHARGE DIAGNOSES  Diagnosis: HTN (hypertension)  Assessment and Plan of Treatment: You have hx of HTN (hypertension), continue home medications.    Diagnosis: DM (diabetes mellitus)  Assessment and Plan of Treatment: You have hx of Your sugar was controlled in hospital, we gave you insulin in hospital. Continue with your home medications on discharge and adhere to low carbohydrate diet.,    Diagnosis: CAD (coronary artery disease)  Assessment and Plan of Treatment: You have hx of CAD (coronary artery disease), continue home medications. Follow up with cardiologist out-patient    Diagnosis: Menorrhagia  Assessment and Plan of Treatment: You presented with a near syncopal episode, you have hx of menorrhagia- heavy menstral bleeding , hemoglobin was noted to be low. You were seen by gyenocologist who recommended to get pelvic ultrasound. Please start taking iron supplements as prescribed and follow up with gynecologist as out-patient. PRINCIPAL DISCHARGE DIAGNOSIS  Diagnosis: Chest pain  Assessment and Plan of Treatment: You presented with chest pain and near syncopal episode, given your history of coronary artery disease, there was concern of cardiac chest pain. Cardiologist was consulted, ECHO and stress test were done which came normal. Chest pain could be secondary to gastritis, you are given protonix. Continue taking as prescribed and follow up with gastroentrologist as out-patient. Follow up with primary medical doctor.      SECONDARY DISCHARGE DIAGNOSES  Diagnosis: Menorrhagia  Assessment and Plan of Treatment: You presented with a near syncopal episode, you have hx of menorrhagia- heavy menstral bleeding , hemoglobin was noted to be low. You were seen by gyenocologist who recommended to get pelvic ultrasound. Please start taking iron supplements as prescribed and follow up with gynecologist as out-patient.    Diagnosis: HTN (hypertension)  Assessment and Plan of Treatment: You have hx of HTN (hypertension), blood pressure was running low during hospital stay hence nifedepine is held. Plesae follow up with your primary medical doctor within 2-3 days, get your BP re-checked, may re-start medication on PMD's instruction if needed.    Diagnosis: DM (diabetes mellitus)  Assessment and Plan of Treatment: You have hx of DM.  Your sugar was controlled in hospital, we gave you insulin in hospital. Continue with your home medications on discharge and adhere to low carbohydrate diet.,    Diagnosis: CAD (coronary artery disease)  Assessment and Plan of Treatment: You have hx of CAD (coronary artery disease), continue home medications. Follow up with cardiologist out-patient

## 2019-06-27 NOTE — DISCHARGE NOTE PROVIDER - HOSPITAL COURSE
38 Female with PMH of Renal transplant (1999), Diabetes, CAD (s/p stent LAD 2018 in [place]), CKD came to hospital for dizziness, feeling sick, fever and heavy menstrual bleeding for 3 days. Pt went to work on Monday and started feeling nauseous. After coming back she noticed increased menstrual bleeding and weakness. Pt stated her menstrual bleeding has always been heavy since Dec 2018 when she started ASA and Brilinta after stent placement. There was also no water access all day so she kept holding her urine. Then she started feeling low grade fevers and her sickness progressed. She has not been able to properly eat and drink. She had heavy menses in toilet and upon standing she had blurry vision and fell on the floor without loss of conciseness. Mild chest pain during the episode. No shortness of breath, headache, abdominal pain, problems with urine or bowel.              ED Course: Hemodynamically stable. EKG showed sinus tachy 115 bpm. Labs showed hb of 8.9 and Cr of 2.24. Cardiac enzymes were normal. CXR was clear.         Patient was admitted for concern of ACS given her significant history. cardiologist Dr Quintanilla was consulted recommended to get ECHO and stress test which was normal. She will be advised to follow up gastroenterologist as out-patient.         She also has menorrhagia, Hb was stable. Iron supplements will be given on discharge. Gyn was consulted, recommended to get pelvic US. Patient is to follow up with out-patient gyn.        Given patient's improved clinical status and current hemodynamic stability, decision was made to discharge. Discussed with attending    Please refer to patient's complete medical chart with documents for a full hospital course, for this is only a brief summary.

## 2019-06-27 NOTE — PROGRESS NOTE ADULT - PROBLEM SELECTOR PLAN 1
Heavy bleeding, BP stable.   Baseline Hb of 10, presented with 8.9   F/u Anemia panel- mixed picture   Goal Hb > 8  Trend CBC q12 hours.   C/w FeSO4 and Folic acid   Follow Pelvic USG  Gyne on board

## 2019-06-27 NOTE — PROGRESS NOTE ADULT - SUBJECTIVE AND OBJECTIVE BOX
Patient denies chest pain or shortness of breath.   Review of systems otherwise (-)  	  MEDICATIONS:  MEDICATIONS  (STANDING):  aspirin enteric coated 81 milliGRAM(s) Oral daily  atorvastatin 80 milliGRAM(s) Oral at bedtime  ferrous    sulfate 325 milliGRAM(s) Oral daily  folic acid 1 milliGRAM(s) Oral daily  insulin glargine Injectable (LANTUS) 15 Unit(s) SubCutaneous at bedtime  insulin lispro (HumaLOG) corrective regimen sliding scale   SubCutaneous Before meals and at bedtime  metoprolol tartrate 12.5 milliGRAM(s) Oral two times a day  pantoprazole    Tablet 40 milliGRAM(s) Oral before breakfast  predniSONE   Tablet 5 milliGRAM(s) Oral daily  sodium chloride 0.9%. 1000 milliLiter(s) (100 mL/Hr) IV Continuous <Continuous>  tacrolimus 3 milliGRAM(s) Oral every 12 hours  ticagrelor 90 milliGRAM(s) Oral every 12 hours      LABS:	 	    CARDIAC MARKERS:  CARDIAC MARKERS ( 27 Jun 2019 06:28 )  <0.015 ng/mL / x     / 31 U/L / x     / <1.0 ng/mL  CARDIAC MARKERS ( 26 Jun 2019 12:51 )  <0.015 ng/mL / x     / 35 U/L / x     / <1.0 ng/mL  CARDIAC MARKERS ( 26 Jun 2019 05:54 )  <0.015 ng/mL / x     / x     / x     / x                                    8.4    9.45  )-----------( 254      ( 27 Jun 2019 06:28 )             27.6     Hemoglobin: 8.4 g/dL (06-27 @ 06:28)  Hemoglobin: 8.2 g/dL (06-26 @ 12:52)  Hemoglobin: 8.9 g/dL (06-26 @ 05:54)      06-27    141  |  114<H>  |  18  ----------------------------<  137<H>  3.8   |  20<L>  |  1.70<H>    Ca    8.3<L>      27 Jun 2019 06:28  Phos  2.7     06-27  Mg     1.5     06-27    TPro  6.7  /  Alb  2.8<L>  /  TBili  0.3  /  DBili  x   /  AST  7<L>  /  ALT  12  /  AlkPhos  90  06-27    Creatinine Trend: 1.70<--, 2.24<--      HgA1c: Hemoglobin A1C, Whole Blood: 6.8 % (06-27 @ 10:12)    TSH: Thyroid Stimulating Hormone, Serum: 0.62 uU/mL (06-27 @ 06:28)        PHYSICAL EXAM:  T(C): 36.4 (06-27-19 @ 11:41), Max: 36.9 (06-26-19 @ 23:38)  HR: 88 (06-27-19 @ 11:41) (82 - 108)  BP: 110/64 (06-27-19 @ 11:41) (106/69 - 121/78)  RR: 18 (06-27-19 @ 11:41) (17 - 18)  SpO2: 100% (06-27-19 @ 11:41) (97% - 100%)  Wt(kg): --  I&O's Summary      26 Jun 2019 07:01  -  27 Jun 2019 07:00  --------------------------------------------------------  IN: 1100 mL / OUT: 0 mL / NET: 1100 mL          Gen: Appears well in NAD  HEENT:  (-)icterus (-)pallor  CV: N S1 S2 1/6 RUBENS (+)2 Pulses B/l  Resp:  Clear to ausculatation B/L, normal effort  GI: (+) BS Soft, NT, ND  Lymph:  (-)Edema, (-)obvious lymphadenopathy  Skin: Warm to touch, Normal turgor  Psych: Appropriate mood and affect      TELEMETRY: 	  sinus        ASSESSMENT/PLAN: 	38y  Female PMH of Renal transplant (1999), Diabetes, CAD (s/p stent LAD 12/2018 in [place] by [facility]), CKD came to hospital for dizziness, feeling sick, fever and heavy menstrual bleeding for 3 days     - Cont ASA and Brilinta for now given PCI <12 months ago  - Awaiting records fro [facility]  - will follow with you  - r/o for MI  - Echo without pertinent findings  - Stress this AM with no infarct or ischemia  - Can D/C tele  - OB/Gyn f/u    Noel Quintanilla MD, FACC  BEEPER [phone]

## 2019-06-27 NOTE — PROGRESS NOTE ADULT - SUBJECTIVE AND OBJECTIVE BOX
PGY 1 Note discussed with supervising resident and primary attending    Patient is a 38y old  Female who presents with a chief complaint of Dizziness and heavy menstrual bleeding (2019 14:07)      INTERVAL HPI/OVERNIGHT EVENTS: Patient was seen and examined, Hemoglobin stable.     MEDICATIONS  (STANDING):  aspirin enteric coated 81 milliGRAM(s) Oral daily  atorvastatin 80 milliGRAM(s) Oral at bedtime  ferrous    sulfate 325 milliGRAM(s) Oral daily  folic acid 1 milliGRAM(s) Oral daily  insulin glargine Injectable (LANTUS) 15 Unit(s) SubCutaneous at bedtime  insulin lispro (HumaLOG) corrective regimen sliding scale   SubCutaneous Before meals and at bedtime  metoprolol tartrate 12.5 milliGRAM(s) Oral two times a day  pantoprazole    Tablet 40 milliGRAM(s) Oral before breakfast  predniSONE   Tablet 5 milliGRAM(s) Oral daily  sodium chloride 0.9%. 1000 milliLiter(s) (100 mL/Hr) IV Continuous <Continuous>  tacrolimus 3 milliGRAM(s) Oral every 12 hours  ticagrelor 90 milliGRAM(s) Oral every 12 hours    MEDICATIONS  (PRN):      __________________________________________________  REVIEW OF SYSTEMS:    CONSTITUTIONAL: No fever,   EYES: no acute visual disturbances  NECK: No pain or stiffness  RESPIRATORY: No cough; No shortness of breath  CARDIOVASCULAR: No chest pain, no palpitations  GASTROINTESTINAL: No pain. No nausea or vomiting; No diarrhea   NEUROLOGICAL: No headache or numbness, no tremors  MUSCULOSKELETAL: No joint pain, no muscle pain  GENITOURINARY: no dysuria, no frequency, no hesitancy  PSYCHIATRY: no depression , no anxiety  ALL OTHER  ROS negative        Vital Signs Last 24 Hrs  T(C): 36.4 (2019 11:41), Max: 36.9 (2019 23:38)  T(F): 97.6 (2019 11:41), Max: 98.5 (2019 23:38)  HR: 88 (2019 11:41) (82 - 108)  BP: 110/64 (2019 11:41) (106/69 - 121/78)  BP(mean): 88 (2019 15:00) (88 - 88)  RR: 18 (2019 11:41) (17 - 18)  SpO2: 100% (2019 11:41) (97% - 100%)    ________________________________________________  PHYSICAL EXAM:  GENERAL: NAD  HEENT: Normocephalic;  conjunctivae and sclerae clear; moist mucous membranes;   NECK : supple  CHEST/LUNG: Clear to auscultation bilaterally with good air entry   HEART: S1 S2  regular; no murmurs, gallops or rubs  ABDOMEN: Soft, Nontender, Nondistended; Bowel sounds present  EXTREMITIES: no cyanosis; no edema; no calf tenderness  SKIN: warm and dry; no rash  NERVOUS SYSTEM:  Awake and alert; Oriented  to place, person and time ; no new deficits    _________________________________________________  LABS:                        8.4    9.45  )-----------( 254      ( 2019 06:28 )             27.6         141  |  114<H>  |  18  ----------------------------<  137<H>  3.8   |  20<L>  |  1.70<H>    Ca    8.3<L>      2019 06:28  Phos  2.7       Mg     1.5         TPro  6.7  /  Alb  2.8<L>  /  TBili  0.3  /  DBili  x   /  AST  7<L>  /  ALT  12  /  AlkPhos  90  27    PT/INR - ( 2019 05:54 )   PT: 12.8 sec;   INR: 1.15 ratio         PTT - ( 2019 05:54 )  PTT:28.4 sec  Urinalysis Basic - ( 2019 12:52 )    Color: Yellow / Appearance: Slightly Turbid / S.005 / pH: x  Gluc: x / Ketone: Negative  / Bili: Negative / Urobili: Negative   Blood: x / Protein: 30 mg/dL / Nitrite: Negative   Leuk Esterase: Small / RBC: >50 /HPF / WBC 3-5 /HPF   Sq Epi: x / Non Sq Epi: Moderate /HPF / Bacteria: Few /HPF      CAPILLARY BLOOD GLUCOSE      POCT Blood Glucose.: 259 mg/dL (2019 11:35)  POCT Blood Glucose.: 148 mg/dL (2019 07:36)  POCT Blood Glucose.: 167 mg/dL (2019 21:10)  POCT Blood Glucose.: 173 mg/dL (2019 16:11)        RADIOLOGY & ADDITIONAL TESTS:    Imaging Personally Reviewed:  YES    Consultant(s) Notes Reviewed:   YES    Care Discussed with Consultants : YES     Plan of care was discussed with patient and /or primary care giver; all questions and concerns were addressed and care was aligned with patient's wishes.

## 2019-06-27 NOTE — DISCHARGE NOTE NURSING/CASE MANAGEMENT/SOCIAL WORK - NSDCPEEMAIL_GEN_ALL_CORE
[facility] for Tobacco Control email [email or web address]

## 2019-06-27 NOTE — DISCHARGE NOTE NURSING/CASE MANAGEMENT/SOCIAL WORK - NSDCPEWEB_GEN_ALL_CORE
[place] website --- [email or web address] Tobacco [email or web address] website --- [email or web address]

## 2019-07-01 LAB
CULTURE RESULTS: SIGNIFICANT CHANGE UP
SPECIMEN SOURCE: SIGNIFICANT CHANGE UP

## 2019-09-16 NOTE — ED ADULT NURSE NOTE - NS ED NURSE LEVEL OF CONSCIOUSNESS SPEECH
Vaccine Information Statement(s) was given today. This has been reviewed, questions answered, and verbal consent given by Patient for injection(s) and administration of Influenza (Inactivated).    1. Does the patient have a moderate to severe fever?  No  2. Has the patient had a serious reaction to a flu shot before?   No  3. Has the patient ever had Guillian [place] Syndrome within 6 weeks of a previous flu shot?  No  4. Is the patient less that 6 months of age?  No    Patient is eligible to receive the vaccine based on all questions being answered as 'No'.    Patient tolerated without incident. See immunization grid for documentation.         Speaking Coherently

## 2019-12-27 ENCOUNTER — INPATIENT (INPATIENT)
Facility: HOSPITAL | Age: 39
LOS: 2 days | Discharge: HOME | DRG: 872 | End: 2019-12-30
Attending: INTERNAL MEDICINE | Admitting: INTERNAL MEDICINE
Payer: COMMERCIAL

## 2019-12-27 VITALS
HEART RATE: 117 BPM | WEIGHT: 229.94 LBS | DIASTOLIC BLOOD PRESSURE: 67 MMHG | OXYGEN SATURATION: 100 % | TEMPERATURE: 100 F | RESPIRATION RATE: 20 BRPM | HEIGHT: 67 IN | SYSTOLIC BLOOD PRESSURE: 114 MMHG

## 2019-12-27 DIAGNOSIS — K52.9 NONINFECTIVE GASTROENTERITIS AND COLITIS, UNSPECIFIED: ICD-10-CM

## 2019-12-27 DIAGNOSIS — E11.9 TYPE 2 DIABETES MELLITUS WITHOUT COMPLICATIONS: ICD-10-CM

## 2019-12-27 DIAGNOSIS — D64.9 ANEMIA, UNSPECIFIED: ICD-10-CM

## 2019-12-27 DIAGNOSIS — Z94.0 KIDNEY TRANSPLANT STATUS: ICD-10-CM

## 2019-12-27 DIAGNOSIS — R73.9 HYPERGLYCEMIA, UNSPECIFIED: ICD-10-CM

## 2019-12-27 DIAGNOSIS — R07.9 CHEST PAIN, UNSPECIFIED: ICD-10-CM

## 2019-12-27 DIAGNOSIS — N39.0 URINARY TRACT INFECTION, SITE NOT SPECIFIED: ICD-10-CM

## 2019-12-27 DIAGNOSIS — I25.10 ATHEROSCLEROTIC HEART DISEASE OF NATIVE CORONARY ARTERY WITHOUT ANGINA PECTORIS: ICD-10-CM

## 2019-12-27 DIAGNOSIS — N17.9 ACUTE KIDNEY FAILURE, UNSPECIFIED: ICD-10-CM

## 2019-12-27 DIAGNOSIS — Z29.9 ENCOUNTER FOR PROPHYLACTIC MEASURES, UNSPECIFIED: ICD-10-CM

## 2019-12-27 LAB
ALBUMIN SERPL ELPH-MCNC: 3 G/DL — LOW (ref 3.5–5)
ALP SERPL-CCNC: 133 U/L — HIGH (ref 40–120)
ALT FLD-CCNC: 17 U/L DA — SIGNIFICANT CHANGE UP (ref 10–60)
ANION GAP SERPL CALC-SCNC: 8 MMOL/L — SIGNIFICANT CHANGE UP (ref 5–17)
ANION GAP SERPL CALC-SCNC: 8 MMOL/L — SIGNIFICANT CHANGE UP (ref 5–17)
APPEARANCE UR: CLEAR — SIGNIFICANT CHANGE UP
AST SERPL-CCNC: 6 U/L — LOW (ref 10–40)
BASE EXCESS BLDV CALC-SCNC: -5.7 MMOL/L — LOW (ref -2–2)
BASOPHILS # BLD AUTO: 0.04 K/UL — SIGNIFICANT CHANGE UP (ref 0–0.2)
BASOPHILS NFR BLD AUTO: 0.3 % — SIGNIFICANT CHANGE UP (ref 0–2)
BILIRUB SERPL-MCNC: 0.6 MG/DL — SIGNIFICANT CHANGE UP (ref 0.2–1.2)
BILIRUB UR-MCNC: NEGATIVE — SIGNIFICANT CHANGE UP
BLOOD GAS COMMENTS, VENOUS: SIGNIFICANT CHANGE UP
BUN SERPL-MCNC: 15 MG/DL — SIGNIFICANT CHANGE UP (ref 7–18)
BUN SERPL-MCNC: 16 MG/DL — SIGNIFICANT CHANGE UP (ref 7–18)
CALCIUM SERPL-MCNC: 7.2 MG/DL — LOW (ref 8.4–10.5)
CALCIUM SERPL-MCNC: 8.6 MG/DL — SIGNIFICANT CHANGE UP (ref 8.4–10.5)
CHLORIDE SERPL-SCNC: 106 MMOL/L — SIGNIFICANT CHANGE UP (ref 96–108)
CHLORIDE SERPL-SCNC: 115 MMOL/L — HIGH (ref 96–108)
CO2 SERPL-SCNC: 18 MMOL/L — LOW (ref 22–31)
CO2 SERPL-SCNC: 21 MMOL/L — LOW (ref 22–31)
COLOR SPEC: YELLOW — SIGNIFICANT CHANGE UP
CREAT SERPL-MCNC: 1.88 MG/DL — HIGH (ref 0.5–1.3)
CREAT SERPL-MCNC: 2.14 MG/DL — HIGH (ref 0.5–1.3)
DIFF PNL FLD: ABNORMAL
EOSINOPHIL # BLD AUTO: 0.02 K/UL — SIGNIFICANT CHANGE UP (ref 0–0.5)
EOSINOPHIL NFR BLD AUTO: 0.2 % — SIGNIFICANT CHANGE UP (ref 0–6)
FLU A RESULT: SIGNIFICANT CHANGE UP
FLU A RESULT: SIGNIFICANT CHANGE UP
FLUAV AG NPH QL: SIGNIFICANT CHANGE UP
FLUBV AG NPH QL: SIGNIFICANT CHANGE UP
GLUCOSE SERPL-MCNC: 324 MG/DL — HIGH (ref 70–99)
GLUCOSE SERPL-MCNC: 423 MG/DL — HIGH (ref 70–99)
GLUCOSE UR QL: 1000 MG/DL
HCG SERPL-ACNC: <1 MIU/ML — SIGNIFICANT CHANGE UP
HCG UR QL: NEGATIVE — SIGNIFICANT CHANGE UP
HCO3 BLDV-SCNC: 19 MMOL/L — LOW (ref 21–29)
HCT VFR BLD CALC: 24.3 % — LOW (ref 34.5–45)
HGB BLD-MCNC: 6.7 G/DL — CRITICAL LOW (ref 11.5–15.5)
HOROWITZ INDEX BLDV+IHG-RTO: 21 — SIGNIFICANT CHANGE UP
IMM GRANULOCYTES NFR BLD AUTO: 0.5 % — SIGNIFICANT CHANGE UP (ref 0–1.5)
IRON SATN MFR SERPL: 12 UG/DL — LOW (ref 40–150)
IRON SATN MFR SERPL: 4 % — LOW (ref 15–50)
KETONES UR-MCNC: NEGATIVE — SIGNIFICANT CHANGE UP
LACTATE SERPL-SCNC: 0.9 MMOL/L — SIGNIFICANT CHANGE UP (ref 0.7–2)
LACTATE SERPL-SCNC: 3.2 MMOL/L — HIGH (ref 0.7–2)
LEUKOCYTE ESTERASE UR-ACNC: ABNORMAL
LIDOCAIN IGE QN: 127 U/L — SIGNIFICANT CHANGE UP (ref 73–393)
LYMPHOCYTES # BLD AUTO: 1.22 K/UL — SIGNIFICANT CHANGE UP (ref 1–3.3)
LYMPHOCYTES # BLD AUTO: 10 % — LOW (ref 13–44)
MCHC RBC-ENTMCNC: 22.9 PG — LOW (ref 27–34)
MCHC RBC-ENTMCNC: 27.6 GM/DL — LOW (ref 32–36)
MCV RBC AUTO: 83.2 FL — SIGNIFICANT CHANGE UP (ref 80–100)
MONOCYTES # BLD AUTO: 0.51 K/UL — SIGNIFICANT CHANGE UP (ref 0–0.9)
MONOCYTES NFR BLD AUTO: 4.2 % — SIGNIFICANT CHANGE UP (ref 2–14)
NEUTROPHILS # BLD AUTO: 10.39 K/UL — HIGH (ref 1.8–7.4)
NEUTROPHILS NFR BLD AUTO: 84.8 % — HIGH (ref 43–77)
NITRITE UR-MCNC: NEGATIVE — SIGNIFICANT CHANGE UP
NRBC # BLD: 0 /100 WBCS — SIGNIFICANT CHANGE UP (ref 0–0)
PCO2 BLDV: 34 MMHG — LOW (ref 35–50)
PH BLDV: 7.36 — SIGNIFICANT CHANGE UP (ref 7.35–7.45)
PH UR: 6 — SIGNIFICANT CHANGE UP (ref 5–8)
PLATELET # BLD AUTO: 318 K/UL — SIGNIFICANT CHANGE UP (ref 150–400)
PO2 BLDV: 21 MMHG — LOW (ref 25–45)
POTASSIUM SERPL-MCNC: 3.8 MMOL/L — SIGNIFICANT CHANGE UP (ref 3.5–5.3)
POTASSIUM SERPL-MCNC: 4 MMOL/L — SIGNIFICANT CHANGE UP (ref 3.5–5.3)
POTASSIUM SERPL-SCNC: 3.8 MMOL/L — SIGNIFICANT CHANGE UP (ref 3.5–5.3)
POTASSIUM SERPL-SCNC: 4 MMOL/L — SIGNIFICANT CHANGE UP (ref 3.5–5.3)
PROT SERPL-MCNC: 7.5 G/DL — SIGNIFICANT CHANGE UP (ref 6–8.3)
PROT UR-MCNC: 30 MG/DL
RBC # BLD: 2.92 M/UL — LOW (ref 3.8–5.2)
RBC # FLD: 20.8 % — HIGH (ref 10.3–14.5)
RSV RESULT: SIGNIFICANT CHANGE UP
RSV RNA RESP QL NAA+PROBE: SIGNIFICANT CHANGE UP
SAO2 % BLDV: 26 % — LOW (ref 67–88)
SODIUM SERPL-SCNC: 135 MMOL/L — SIGNIFICANT CHANGE UP (ref 135–145)
SODIUM SERPL-SCNC: 141 MMOL/L — SIGNIFICANT CHANGE UP (ref 135–145)
SP GR SPEC: 1.01 — SIGNIFICANT CHANGE UP (ref 1.01–1.02)
TIBC SERPL-MCNC: 331 UG/DL — SIGNIFICANT CHANGE UP (ref 250–450)
TROPONIN I SERPL-MCNC: <0.015 NG/ML — SIGNIFICANT CHANGE UP (ref 0–0.04)
UIBC SERPL-MCNC: 319 UG/DL — SIGNIFICANT CHANGE UP (ref 110–370)
UROBILINOGEN FLD QL: NEGATIVE — SIGNIFICANT CHANGE UP
WBC # BLD: 12.24 K/UL — HIGH (ref 3.8–10.5)
WBC # FLD AUTO: 12.24 K/UL — HIGH (ref 3.8–10.5)

## 2019-12-27 PROCEDURE — 71045 X-RAY EXAM CHEST 1 VIEW: CPT | Mod: 26

## 2019-12-27 PROCEDURE — 99291 CRITICAL CARE FIRST HOUR: CPT

## 2019-12-27 RX ORDER — TICAGRELOR 90 MG/1
1 TABLET ORAL
Qty: 0 | Refills: 0 | DISCHARGE

## 2019-12-27 RX ORDER — CEFTRIAXONE 500 MG/1
INJECTION, POWDER, FOR SOLUTION INTRAMUSCULAR; INTRAVENOUS
Refills: 0 | Status: DISCONTINUED | OUTPATIENT
Start: 2019-12-27 | End: 2019-12-30

## 2019-12-27 RX ORDER — INSULIN GLARGINE 100 [IU]/ML
15 INJECTION, SOLUTION SUBCUTANEOUS AT BEDTIME
Refills: 0 | Status: DISCONTINUED | OUTPATIENT
Start: 2019-12-27 | End: 2019-12-30

## 2019-12-27 RX ORDER — CEFTRIAXONE 500 MG/1
1000 INJECTION, POWDER, FOR SOLUTION INTRAMUSCULAR; INTRAVENOUS ONCE
Refills: 0 | Status: DISCONTINUED | OUTPATIENT
Start: 2019-12-27 | End: 2019-12-27

## 2019-12-27 RX ORDER — ASPIRIN/CALCIUM CARB/MAGNESIUM 324 MG
81 TABLET ORAL DAILY
Refills: 0 | Status: DISCONTINUED | OUTPATIENT
Start: 2019-12-27 | End: 2019-12-30

## 2019-12-27 RX ORDER — INSULIN HUMAN 100 [IU]/ML
10 INJECTION, SOLUTION SUBCUTANEOUS ONCE
Refills: 0 | Status: COMPLETED | OUTPATIENT
Start: 2019-12-27 | End: 2019-12-27

## 2019-12-27 RX ORDER — IRON SUCROSE 20 MG/ML
200 INJECTION, SOLUTION INTRAVENOUS ONCE
Refills: 0 | Status: COMPLETED | OUTPATIENT
Start: 2019-12-28 | End: 2019-12-28

## 2019-12-27 RX ORDER — FAMOTIDINE 10 MG/ML
20 INJECTION INTRAVENOUS ONCE
Refills: 0 | Status: COMPLETED | OUTPATIENT
Start: 2019-12-27 | End: 2019-12-27

## 2019-12-27 RX ORDER — ATORVASTATIN CALCIUM 80 MG/1
80 TABLET, FILM COATED ORAL AT BEDTIME
Refills: 0 | Status: DISCONTINUED | OUTPATIENT
Start: 2019-12-27 | End: 2019-12-30

## 2019-12-27 RX ORDER — TACROLIMUS 5 MG/1
3 CAPSULE ORAL EVERY 12 HOURS
Refills: 0 | Status: DISCONTINUED | OUTPATIENT
Start: 2019-12-27 | End: 2019-12-30

## 2019-12-27 RX ORDER — HYDROMORPHONE HYDROCHLORIDE 2 MG/ML
0.5 INJECTION INTRAMUSCULAR; INTRAVENOUS; SUBCUTANEOUS ONCE
Refills: 0 | Status: DISCONTINUED | OUTPATIENT
Start: 2019-12-27 | End: 2019-12-27

## 2019-12-27 RX ORDER — SODIUM CHLORIDE 9 MG/ML
1000 INJECTION INTRAMUSCULAR; INTRAVENOUS; SUBCUTANEOUS
Refills: 0 | Status: DISCONTINUED | OUTPATIENT
Start: 2019-12-27 | End: 2019-12-30

## 2019-12-27 RX ORDER — SODIUM CHLORIDE 9 MG/ML
1000 INJECTION INTRAMUSCULAR; INTRAVENOUS; SUBCUTANEOUS ONCE
Refills: 0 | Status: COMPLETED | OUTPATIENT
Start: 2019-12-27 | End: 2019-12-27

## 2019-12-27 RX ORDER — CEFTRIAXONE 500 MG/1
1000 INJECTION, POWDER, FOR SOLUTION INTRAMUSCULAR; INTRAVENOUS ONCE
Refills: 0 | Status: COMPLETED | OUTPATIENT
Start: 2019-12-27 | End: 2019-12-27

## 2019-12-27 RX ORDER — METOCLOPRAMIDE HCL 10 MG
10 TABLET ORAL ONCE
Refills: 0 | Status: COMPLETED | OUTPATIENT
Start: 2019-12-27 | End: 2019-12-27

## 2019-12-27 RX ORDER — CLOPIDOGREL BISULFATE 75 MG/1
75 TABLET, FILM COATED ORAL DAILY
Refills: 0 | Status: DISCONTINUED | OUTPATIENT
Start: 2019-12-27 | End: 2019-12-30

## 2019-12-27 RX ORDER — DIPHENHYDRAMINE HCL 50 MG
25 CAPSULE ORAL ONCE
Refills: 0 | Status: COMPLETED | OUTPATIENT
Start: 2019-12-27 | End: 2019-12-27

## 2019-12-27 RX ORDER — ACETAMINOPHEN 500 MG
650 TABLET ORAL EVERY 6 HOURS
Refills: 0 | Status: DISCONTINUED | OUTPATIENT
Start: 2019-12-27 | End: 2019-12-30

## 2019-12-27 RX ORDER — CEFTRIAXONE 500 MG/1
1000 INJECTION, POWDER, FOR SOLUTION INTRAMUSCULAR; INTRAVENOUS EVERY 24 HOURS
Refills: 0 | Status: DISCONTINUED | OUTPATIENT
Start: 2019-12-28 | End: 2019-12-30

## 2019-12-27 RX ORDER — ACETAMINOPHEN 500 MG
975 TABLET ORAL ONCE
Refills: 0 | Status: COMPLETED | OUTPATIENT
Start: 2019-12-27 | End: 2019-12-27

## 2019-12-27 RX ORDER — VANCOMYCIN HCL 1 G
1000 VIAL (EA) INTRAVENOUS ONCE
Refills: 0 | Status: COMPLETED | OUTPATIENT
Start: 2019-12-27 | End: 2019-12-27

## 2019-12-27 RX ORDER — MEROPENEM 1 G/30ML
1000 INJECTION INTRAVENOUS EVERY 12 HOURS
Refills: 0 | Status: COMPLETED | OUTPATIENT
Start: 2019-12-27 | End: 2019-12-27

## 2019-12-27 RX ADMIN — FAMOTIDINE 20 MILLIGRAM(S): 10 INJECTION INTRAVENOUS at 18:10

## 2019-12-27 RX ADMIN — Medication 25 MILLIGRAM(S): at 18:10

## 2019-12-27 RX ADMIN — SODIUM CHLORIDE 1000 MILLILITER(S): 9 INJECTION INTRAMUSCULAR; INTRAVENOUS; SUBCUTANEOUS at 18:10

## 2019-12-27 RX ADMIN — SODIUM CHLORIDE 1000 MILLILITER(S): 9 INJECTION INTRAMUSCULAR; INTRAVENOUS; SUBCUTANEOUS at 20:06

## 2019-12-27 RX ADMIN — Medication 975 MILLIGRAM(S): at 19:24

## 2019-12-27 RX ADMIN — HYDROMORPHONE HYDROCHLORIDE 0.5 MILLIGRAM(S): 2 INJECTION INTRAMUSCULAR; INTRAVENOUS; SUBCUTANEOUS at 22:16

## 2019-12-27 RX ADMIN — MEROPENEM 100 MILLIGRAM(S): 1 INJECTION INTRAVENOUS at 20:00

## 2019-12-27 RX ADMIN — Medication 10 MILLIGRAM(S): at 18:10

## 2019-12-27 RX ADMIN — Medication 250 MILLIGRAM(S): at 21:00

## 2019-12-27 RX ADMIN — Medication 975 MILLIGRAM(S): at 21:28

## 2019-12-27 RX ADMIN — INSULIN HUMAN 10 UNIT(S): 100 INJECTION, SOLUTION SUBCUTANEOUS at 22:00

## 2019-12-27 NOTE — H&P ADULT - PROBLEM SELECTOR PLAN 1
u/a positive, asymptomatic pt, lactate 3.2   s/p 2 L ivf   will repeat lactate   will treat given immunodepressed  will start pt on meropenem  f/u urine culture and blood culture u/a positive, asymptomatic pt, lactate 3.2   s/p 2 L ivf   will repeat lactate   will treat given immunodepressed  will start pt on rocephin   f/u urine culture and blood culture

## 2019-12-27 NOTE — ED PROVIDER NOTE - NEUROLOGICAL, MLM
Alert and oriented, no focal deficits, no motor or sensory deficits. Alert and oriented, no focal deficits, no motor or sensory deficits. No meningeal signs.

## 2019-12-27 NOTE — ED PROVIDER NOTE - OBJECTIVE STATEMENT
40 y/o F patient with a significant PMHx of DM, MI, coronary stent, anemia, recent blood transfusion in July and significant PSHx of kidney transplant presents to the ED with c/o headache, nausea, cough, chest tightness like "someone is sitting on her chest". Patient reports that the vomiting, diarrhea started around 5 am in the morning  and the vomiting resolved around 7-8 am. Patient states that a similar headache occurred in the past and it is recurring.

## 2019-12-27 NOTE — ED PROVIDER NOTE - CONSTITUTIONAL, MLM
normal... Well appearing, awake, alert, oriented to person, place, time/situation and in no apparent distress. awake, alert, oriented to person, place, time/situation and in no apparent distress.

## 2019-12-27 NOTE — H&P ADULT - PROBLEM SELECTOR PLAN 5
s/p kidney transplant   on tacrolimus and prednisone   will send tacrolimus level Likely pre renal  given vomiting and diarrhea   will give ivf   monitor BMP

## 2019-12-27 NOTE — ED ADULT NURSE NOTE - NSIMPLEMENTINTERV_GEN_ALL_ED
Implemented All Universal Safety Interventions:  [place] to call system. Call bell, personal items and telephone within reach. Instruct patient to call for assistance. Room bathroom lighting operational. Non-slip footwear when patient is off stretcher. Physically safe environment: no spills, clutter or unnecessary equipment. Stretcher in lowest position, wheels locked, appropriate side rails in place.

## 2019-12-27 NOTE — ED PROVIDER NOTE - PROGRESS NOTE DETAILS
Pt with fever, noted to be 103.9 on rectal exam, code sepsis initiated, UTI, diabetic and glucose is elevated, pt says that her baseline creatinine is normally 1.5 and elevated now, so she is being hydrated, Hemoglobin is lower than normal and pt is symptomatic, so needs a blood transfusion and will give parenteral abx. She says that her periods have been longer than normal since she was started on plavix and aspirin after her heart attack and she is not bleeding now.

## 2019-12-27 NOTE — H&P ADULT - PROBLEM SELECTOR PLAN 6
pt takes lantus 32, novolog 15 tid s/p kidney transplant   on tacrolimus and prednisone   will send tacrolimus level

## 2019-12-27 NOTE — H&P ADULT - NSHPPHYSICALEXAM_GEN_ALL_CORE
Vital Signs Last 24 Hrs  T(C): 39.9 (27 Dec 2019 19:23), Max: 39.9 (27 Dec 2019 19:23)  T(F): 103.9 (27 Dec 2019 19:23), Max: 103.9 (27 Dec 2019 19:23)  HR: 117 (27 Dec 2019 17:05) (117 - 117)  BP: 114/67 (27 Dec 2019 17:05) (114/67 - 114/67)  BP(mean): --  RR: 20 (27 Dec 2019 17:05) (20 - 20)  SpO2: 100% (27 Dec 2019 17:05) (100% - 100%)    PHYSICAL EXAM:  GENERAL: NAD, well-developed, in distress, obese  HEAD:  Atraumatic, Normocephalic  EYES: EOMI, PERRLA, conjunctiva and sclera clear  NECK: Supple, No JVD  CHEST/LUNG: Clear to auscultation bilaterally; No wheeze  HEART: Regular rate and rhythm; s1+ s2+  ABDOMEN: Soft, Nontender, Nondistended; Bowel sounds present  EXTREMITIES:, No clubbing, cyanosis, or edema  NEUROLOGY:AAOx3 non-focal  SKIN: No rashes or lesions

## 2019-12-27 NOTE — H&P ADULT - PROBLEM SELECTOR PLAN 3
anemia from heavy menstruation   f/u iron panel sent, ferritin  will transfuse 2 prbcs Chest pain, unspecified type.  Plan: pt has sob and chest pressure   trop negative f/u t2 and t3  will monitor on tele to r/o acs  c/w plavix, asa, bb, lipitor.   echo Chest pain, unspecified type.  Plan: pt has sob and chest pressure   EKG sinus tachy  trop negative f/u t2 and t3  will monitor on tele to r/o acs  c/w plavix, asa, bb, lipitor.   echo

## 2019-12-27 NOTE — ED PROVIDER NOTE - CARE PLAN
Principal Discharge DX:	Hyperglycemia  Secondary Diagnosis:	UTI (urinary tract infection)  Secondary Diagnosis:	Anemia  Secondary Diagnosis:	Immunocompromised  Secondary Diagnosis:	Renal insufficiency

## 2019-12-27 NOTE — H&P ADULT - PROBLEM SELECTOR PLAN 4
?viral gastroenteritis  s/p iv hydration ?viral gastroenteritis  s/p iv hydration  will start on ?viral gastroenteritis  s/p iv hydration  will start on IVF

## 2019-12-27 NOTE — H&P ADULT - PROBLEM SELECTOR PLAN 2
pt has sob and chest pressure   trop negative   will monitor on tele to r/o acs  c/w plavix, asa, bb, lipitor anemia from heavy menstruation   f/u iron panel sent, ferritin  will transfuse 2 prbcs

## 2019-12-27 NOTE — H&P ADULT - ASSESSMENT
Patient is 39 year old Female with a significant PMHx of DM, MI, coronary stent, anemia, recent blood transfusion in July and significant PSHx of kidney transplant presents to the ED with c/o headache, nausea, cough, chest tightness like "someone is sitting on her chest". Patient reports that the nonbloody vomiting (salad came out), non bloody diarrhea started around 6 am in the morning  and the vomiting resolved around 9 am. Patient states she had salad yesterday in the lunch with colleagues. Patient states that a similar headache occurred in the past and it is recurring. Pt states she is also having difficulty breathing and she becomes easily fatigue. Pt states she has a hx of anemia in the past 2/2 to heavy menstruation, she finished her periods today. Pt was told her blood count was 7.2 a week ago. pt had last transfusion in july 2019 for anemia. Pt states she feels her left leg is feeling numb since she came in to the hospital.

## 2019-12-28 LAB
ALBUMIN SERPL ELPH-MCNC: 2.5 G/DL — LOW (ref 3.5–5)
ALP SERPL-CCNC: 106 U/L — SIGNIFICANT CHANGE UP (ref 40–120)
ALT FLD-CCNC: 12 U/L DA — SIGNIFICANT CHANGE UP (ref 10–60)
ANION GAP SERPL CALC-SCNC: 7 MMOL/L — SIGNIFICANT CHANGE UP (ref 5–17)
AST SERPL-CCNC: 7 U/L — LOW (ref 10–40)
BASOPHILS # BLD AUTO: 0.04 K/UL — SIGNIFICANT CHANGE UP (ref 0–0.2)
BASOPHILS NFR BLD AUTO: 0.3 % — SIGNIFICANT CHANGE UP (ref 0–2)
BILIRUB SERPL-MCNC: 1 MG/DL — SIGNIFICANT CHANGE UP (ref 0.2–1.2)
BUN SERPL-MCNC: 15 MG/DL — SIGNIFICANT CHANGE UP (ref 7–18)
CALCIUM SERPL-MCNC: 7.9 MG/DL — LOW (ref 8.4–10.5)
CHLORIDE SERPL-SCNC: 111 MMOL/L — HIGH (ref 96–108)
CHOLEST SERPL-MCNC: 104 MG/DL — SIGNIFICANT CHANGE UP (ref 10–199)
CO2 SERPL-SCNC: 20 MMOL/L — LOW (ref 22–31)
CREAT SERPL-MCNC: 2.1 MG/DL — HIGH (ref 0.5–1.3)
EOSINOPHIL # BLD AUTO: 0.02 K/UL — SIGNIFICANT CHANGE UP (ref 0–0.5)
EOSINOPHIL NFR BLD AUTO: 0.2 % — SIGNIFICANT CHANGE UP (ref 0–6)
FERRITIN SERPL-MCNC: 45 NG/ML — SIGNIFICANT CHANGE UP (ref 15–150)
GLUCOSE BLDC GLUCOMTR-MCNC: 189 MG/DL — HIGH (ref 70–99)
GLUCOSE BLDC GLUCOMTR-MCNC: 220 MG/DL — HIGH (ref 70–99)
GLUCOSE BLDC GLUCOMTR-MCNC: 242 MG/DL — HIGH (ref 70–99)
GLUCOSE BLDC GLUCOMTR-MCNC: 258 MG/DL — HIGH (ref 70–99)
GLUCOSE BLDC GLUCOMTR-MCNC: 287 MG/DL — HIGH (ref 70–99)
GLUCOSE SERPL-MCNC: 267 MG/DL — HIGH (ref 70–99)
HBA1C BLD-MCNC: 8.5 % — HIGH (ref 4–5.6)
HCT VFR BLD CALC: 26.4 % — LOW (ref 34.5–45)
HCT VFR BLD CALC: 26.6 % — LOW (ref 34.5–45)
HDLC SERPL-MCNC: 46 MG/DL — LOW
HGB BLD-MCNC: 7.6 G/DL — LOW (ref 11.5–15.5)
HGB BLD-MCNC: 7.8 G/DL — LOW (ref 11.5–15.5)
IMM GRANULOCYTES NFR BLD AUTO: 0.7 % — SIGNIFICANT CHANGE UP (ref 0–1.5)
LIPID PNL WITH DIRECT LDL SERPL: 29 MG/DL — SIGNIFICANT CHANGE UP
LYMPHOCYTES # BLD AUTO: 17.9 % — SIGNIFICANT CHANGE UP (ref 13–44)
LYMPHOCYTES # BLD AUTO: 2.25 K/UL — SIGNIFICANT CHANGE UP (ref 1–3.3)
MAGNESIUM SERPL-MCNC: 1.4 MG/DL — LOW (ref 1.6–2.6)
MCHC RBC-ENTMCNC: 24.7 PG — LOW (ref 27–34)
MCHC RBC-ENTMCNC: 24.8 PG — LOW (ref 27–34)
MCHC RBC-ENTMCNC: 28.8 GM/DL — LOW (ref 32–36)
MCHC RBC-ENTMCNC: 29.3 GM/DL — LOW (ref 32–36)
MCV RBC AUTO: 84.4 FL — SIGNIFICANT CHANGE UP (ref 80–100)
MCV RBC AUTO: 85.7 FL — SIGNIFICANT CHANGE UP (ref 80–100)
MONOCYTES # BLD AUTO: 0.75 K/UL — SIGNIFICANT CHANGE UP (ref 0–0.9)
MONOCYTES NFR BLD AUTO: 6 % — SIGNIFICANT CHANGE UP (ref 2–14)
NEUTROPHILS # BLD AUTO: 9.44 K/UL — HIGH (ref 1.8–7.4)
NEUTROPHILS NFR BLD AUTO: 74.9 % — SIGNIFICANT CHANGE UP (ref 43–77)
NRBC # BLD: 0 /100 WBCS — SIGNIFICANT CHANGE UP (ref 0–0)
NRBC # BLD: 0 /100 WBCS — SIGNIFICANT CHANGE UP (ref 0–0)
PHOSPHATE SERPL-MCNC: 2.1 MG/DL — LOW (ref 2.5–4.5)
PLATELET # BLD AUTO: 223 K/UL — SIGNIFICANT CHANGE UP (ref 150–400)
PLATELET # BLD AUTO: 280 K/UL — SIGNIFICANT CHANGE UP (ref 150–400)
POTASSIUM SERPL-MCNC: 4.4 MMOL/L — SIGNIFICANT CHANGE UP (ref 3.5–5.3)
POTASSIUM SERPL-SCNC: 4.4 MMOL/L — SIGNIFICANT CHANGE UP (ref 3.5–5.3)
PROT SERPL-MCNC: 6.5 G/DL — SIGNIFICANT CHANGE UP (ref 6–8.3)
RBC # BLD: 3.08 M/UL — LOW (ref 3.8–5.2)
RBC # BLD: 3.15 M/UL — LOW (ref 3.8–5.2)
RBC # FLD: 19.3 % — HIGH (ref 10.3–14.5)
RBC # FLD: 19.8 % — HIGH (ref 10.3–14.5)
SODIUM SERPL-SCNC: 138 MMOL/L — SIGNIFICANT CHANGE UP (ref 135–145)
TACROLIMUS SERPL-MCNC: 9.7 NG/ML — SIGNIFICANT CHANGE UP
TOTAL CHOLESTEROL/HDL RATIO MEASUREMENT: 2.3 RATIO — LOW (ref 3.3–7.1)
TRIGL SERPL-MCNC: 145 MG/DL — SIGNIFICANT CHANGE UP (ref 10–149)
TROPONIN I SERPL-MCNC: <0.015 NG/ML — SIGNIFICANT CHANGE UP (ref 0–0.04)
TROPONIN I SERPL-MCNC: <0.015 NG/ML — SIGNIFICANT CHANGE UP (ref 0–0.04)
TSH SERPL-MCNC: 0.75 UU/ML — SIGNIFICANT CHANGE UP (ref 0.34–4.82)
WBC # BLD: 12.59 K/UL — HIGH (ref 3.8–10.5)
WBC # BLD: 8.76 K/UL — SIGNIFICANT CHANGE UP (ref 3.8–10.5)
WBC # FLD AUTO: 12.59 K/UL — HIGH (ref 3.8–10.5)
WBC # FLD AUTO: 8.76 K/UL — SIGNIFICANT CHANGE UP (ref 3.8–10.5)

## 2019-12-28 PROCEDURE — 70450 CT HEAD/BRAIN W/O DYE: CPT | Mod: 26

## 2019-12-28 RX ORDER — SODIUM,POTASSIUM PHOSPHATES 278-250MG
1 POWDER IN PACKET (EA) ORAL
Refills: 0 | Status: COMPLETED | OUTPATIENT
Start: 2019-12-28 | End: 2019-12-29

## 2019-12-28 RX ORDER — INSULIN LISPRO 100/ML
VIAL (ML) SUBCUTANEOUS
Refills: 0 | Status: DISCONTINUED | OUTPATIENT
Start: 2019-12-28 | End: 2019-12-30

## 2019-12-28 RX ORDER — INSULIN LISPRO 100/ML
5 VIAL (ML) SUBCUTANEOUS
Refills: 0 | Status: DISCONTINUED | OUTPATIENT
Start: 2019-12-28 | End: 2019-12-30

## 2019-12-28 RX ORDER — IRON SUCROSE 20 MG/ML
100 INJECTION, SOLUTION INTRAVENOUS EVERY 24 HOURS
Refills: 0 | Status: DISCONTINUED | OUTPATIENT
Start: 2019-12-28 | End: 2019-12-30

## 2019-12-28 RX ORDER — ACETAMINOPHEN 500 MG
325 TABLET ORAL ONCE
Refills: 0 | Status: DISCONTINUED | OUTPATIENT
Start: 2019-12-28 | End: 2019-12-28

## 2019-12-28 RX ORDER — OXYCODONE AND ACETAMINOPHEN 5; 325 MG/1; MG/1
1 TABLET ORAL EVERY 6 HOURS
Refills: 0 | Status: DISCONTINUED | OUTPATIENT
Start: 2019-12-28 | End: 2019-12-30

## 2019-12-28 RX ORDER — MAGNESIUM OXIDE 400 MG ORAL TABLET 241.3 MG
400 TABLET ORAL
Refills: 0 | Status: COMPLETED | OUTPATIENT
Start: 2019-12-28 | End: 2019-12-29

## 2019-12-28 RX ORDER — INFLUENZA VIRUS VACCINE 15; 15; 15; 15 UG/.5ML; UG/.5ML; UG/.5ML; UG/.5ML
0.5 SUSPENSION INTRAMUSCULAR ONCE
Refills: 0 | Status: COMPLETED | OUTPATIENT
Start: 2019-12-28 | End: 2019-12-30

## 2019-12-28 RX ORDER — ONDANSETRON 8 MG/1
4 TABLET, FILM COATED ORAL ONCE
Refills: 0 | Status: COMPLETED | OUTPATIENT
Start: 2019-12-28 | End: 2019-12-28

## 2019-12-28 RX ORDER — TRAMADOL HYDROCHLORIDE 50 MG/1
50 TABLET ORAL EVERY 6 HOURS
Refills: 0 | Status: DISCONTINUED | OUTPATIENT
Start: 2019-12-28 | End: 2019-12-28

## 2019-12-28 RX ADMIN — Medication 650 MILLIGRAM(S): at 17:19

## 2019-12-28 RX ADMIN — CEFTRIAXONE 100 MILLIGRAM(S): 500 INJECTION, POWDER, FOR SOLUTION INTRAMUSCULAR; INTRAVENOUS at 23:44

## 2019-12-28 RX ADMIN — Medication 650 MILLIGRAM(S): at 02:24

## 2019-12-28 RX ADMIN — Medication 2: at 22:06

## 2019-12-28 RX ADMIN — Medication 5 UNIT(S): at 08:05

## 2019-12-28 RX ADMIN — Medication 5 UNIT(S): at 17:18

## 2019-12-28 RX ADMIN — TRAMADOL HYDROCHLORIDE 50 MILLIGRAM(S): 50 TABLET ORAL at 06:37

## 2019-12-28 RX ADMIN — Medication 650 MILLIGRAM(S): at 05:42

## 2019-12-28 RX ADMIN — SODIUM CHLORIDE 100 MILLILITER(S): 9 INJECTION INTRAMUSCULAR; INTRAVENOUS; SUBCUTANEOUS at 06:43

## 2019-12-28 RX ADMIN — TRAMADOL HYDROCHLORIDE 50 MILLIGRAM(S): 50 TABLET ORAL at 07:38

## 2019-12-28 RX ADMIN — Medication 3: at 08:05

## 2019-12-28 RX ADMIN — Medication 81 MILLIGRAM(S): at 12:14

## 2019-12-28 RX ADMIN — INSULIN GLARGINE 15 UNIT(S): 100 INJECTION, SOLUTION SUBCUTANEOUS at 03:36

## 2019-12-28 RX ADMIN — Medication 650 MILLIGRAM(S): at 19:00

## 2019-12-28 RX ADMIN — CEFTRIAXONE 100 MILLIGRAM(S): 500 INJECTION, POWDER, FOR SOLUTION INTRAMUSCULAR; INTRAVENOUS at 00:14

## 2019-12-28 RX ADMIN — IRON SUCROSE 110 MILLIGRAM(S): 20 INJECTION, SOLUTION INTRAVENOUS at 06:35

## 2019-12-28 RX ADMIN — MAGNESIUM OXIDE 400 MG ORAL TABLET 400 MILLIGRAM(S): 241.3 TABLET ORAL at 14:28

## 2019-12-28 RX ADMIN — Medication 1 TABLET(S): at 12:14

## 2019-12-28 RX ADMIN — Medication 1 TABLET(S): at 21:34

## 2019-12-28 RX ADMIN — Medication 3: at 12:14

## 2019-12-28 RX ADMIN — ATORVASTATIN CALCIUM 80 MILLIGRAM(S): 80 TABLET, FILM COATED ORAL at 21:34

## 2019-12-28 RX ADMIN — SODIUM CHLORIDE 100 MILLILITER(S): 9 INJECTION INTRAMUSCULAR; INTRAVENOUS; SUBCUTANEOUS at 00:00

## 2019-12-28 RX ADMIN — HYDROMORPHONE HYDROCHLORIDE 0.5 MILLIGRAM(S): 2 INJECTION INTRAMUSCULAR; INTRAVENOUS; SUBCUTANEOUS at 00:15

## 2019-12-28 RX ADMIN — TACROLIMUS 3 MILLIGRAM(S): 5 CAPSULE ORAL at 06:36

## 2019-12-28 RX ADMIN — INSULIN GLARGINE 15 UNIT(S): 100 INJECTION, SOLUTION SUBCUTANEOUS at 22:06

## 2019-12-28 RX ADMIN — Medication 1 TABLET(S): at 17:18

## 2019-12-28 RX ADMIN — Medication 2: at 17:18

## 2019-12-28 RX ADMIN — ONDANSETRON 4 MILLIGRAM(S): 8 TABLET, FILM COATED ORAL at 12:29

## 2019-12-28 RX ADMIN — TACROLIMUS 3 MILLIGRAM(S): 5 CAPSULE ORAL at 17:18

## 2019-12-28 RX ADMIN — SODIUM CHLORIDE 100 MILLILITER(S): 9 INJECTION INTRAMUSCULAR; INTRAVENOUS; SUBCUTANEOUS at 20:00

## 2019-12-28 RX ADMIN — MAGNESIUM OXIDE 400 MG ORAL TABLET 400 MILLIGRAM(S): 241.3 TABLET ORAL at 17:18

## 2019-12-28 RX ADMIN — ATORVASTATIN CALCIUM 80 MILLIGRAM(S): 80 TABLET, FILM COATED ORAL at 00:15

## 2019-12-28 RX ADMIN — Medication 5 UNIT(S): at 12:14

## 2019-12-28 RX ADMIN — CLOPIDOGREL BISULFATE 75 MILLIGRAM(S): 75 TABLET, FILM COATED ORAL at 12:14

## 2019-12-28 RX ADMIN — Medication 5 MILLIGRAM(S): at 06:36

## 2019-12-28 NOTE — PROGRESS NOTE ADULT - PROBLEM SELECTOR PLAN 5
Likely pre renal  given vomiting and diarrhea   will give ivf   monitor BMP Likely pre renal  given vomiting and diarrhea   Cr worsened today  monitor BMP

## 2019-12-28 NOTE — PROGRESS NOTE ADULT - PROBLEM SELECTOR PLAN 6
s/p kidney transplant   on tacrolimus and prednisone   will send tacrolimus level s/p kidney transplant   on tacrolimus and prednisone   f/u tacrolimus level

## 2019-12-28 NOTE — PROGRESS NOTE ADULT - PROBLEM SELECTOR PLAN 4
?viral gastroenteritis  s/p iv hydration  will start on IVF ?viral gastroenteritis  s/p iv hydration  resolved

## 2019-12-28 NOTE — PROGRESS NOTE ADULT - PROBLEM SELECTOR PLAN 2
anemia from heavy menstruation   f/u iron panel sent, ferritin  will transfuse 2 prbcs u/a positive, asymptomatic pt, lactate 3.2   lactate normal 0.9 now  c/w rocephin   pt remains afebrile  f/u urine culture and blood culture

## 2019-12-28 NOTE — PROGRESS NOTE ADULT - PROBLEM SELECTOR PLAN 3
Chest pain, unspecified type.  Plan: pt has sob and chest pressure   EKG sinus tachy  trop negative f/u t2 and t3  will monitor on tele to r/o acs  c/w plavix, asa, bb, lipitor.   echo Chest pain, unspecified type.  Plan: pt has sob and chest pressure   EKG sinus tachy  trop negative   will monitor on tele to r/o acs  c/w plavix, asa, bb, lipitor.   f/u echo

## 2019-12-28 NOTE — PROGRESS NOTE ADULT - PROBLEM SELECTOR PLAN 1
u/a positive, asymptomatic pt, lactate 3.2   s/p 2 L ivf   will repeat lactate   will treat given immunodepressed  will start pt on rocephin   f/u urine culture and blood culture anemia from heavy menstruation   pt has very low iron and iron sat, normal ferritin  Hb 6.7->7.8 s/p 2 unit pRBCs yest  f/u repeat cbc  continue to monitor Hb   f/u occult blood  f/u usg pelvis

## 2019-12-28 NOTE — PROGRESS NOTE ADULT - SUBJECTIVE AND OBJECTIVE BOX
PGY 1 Note discussed with supervising resident and primary attending    Patient is a 39y old  Female who presents with a chief complaint of sepsis (27 Dec 2019 20:43)      INTERVAL HPI/OVERNIGHT EVENTS: Patient seen and examined at the bedside.     MEDICATIONS  (STANDING):  aspirin enteric coated 81 milliGRAM(s) Oral daily  atorvastatin 80 milliGRAM(s) Oral at bedtime  cefTRIAXone   IVPB 1000 milliGRAM(s) IV Intermittent every 24 hours  cefTRIAXone   IVPB      clopidogrel Tablet 75 milliGRAM(s) Oral daily  influenza   Vaccine 0.5 milliLiter(s) IntraMuscular once  insulin glargine Injectable (LANTUS) 15 Unit(s) SubCutaneous at bedtime  insulin lispro (HumaLOG) corrective regimen sliding scale   SubCutaneous Before meals and at bedtime  insulin lispro Injectable (HumaLOG) 5 Unit(s) SubCutaneous three times a day before meals  predniSONE   Tablet 5 milliGRAM(s) Oral daily  sodium chloride 0.9%. 1000 milliLiter(s) (100 mL/Hr) IV Continuous <Continuous>  tacrolimus 3 milliGRAM(s) Oral every 12 hours    MEDICATIONS  (PRN):  acetaminophen   Tablet .. 650 milliGRAM(s) Oral every 6 hours PRN Mild Pain (1 - 3)  traMADol 50 milliGRAM(s) Oral every 6 hours PRN Moderate Pain (4 - 6)      __________________________________________________  REVIEW OF SYSTEMS:    CONSTITUTIONAL: No fever,   EYES: no acute visual disturbances  NECK: No pain or stiffness  RESPIRATORY: No cough; No shortness of breath  CARDIOVASCULAR: No chest pain, no palpitations  GASTROINTESTINAL: No pain. No nausea or vomiting; No diarrhea   NEUROLOGICAL: No headache or numbness, no tremors  MUSCULOSKELETAL: No joint pain, no muscle pain  GENITOURINARY: no dysuria, no frequency, no hesitancy  PSYCHIATRY: no depression , no anxiety  ALL OTHER  ROS negative        Vital Signs Last 24 Hrs  T(C): 36.9 (28 Dec 2019 08:12), Max: 39.9 (27 Dec 2019 19:23)  T(F): 98.4 (28 Dec 2019 08:12), Max: 103.9 (27 Dec 2019 19:23)  HR: 93 (28 Dec 2019 08:12) (93 - 129)  BP: 124/81 (28 Dec 2019 08:12) (100/52 - 124/81)  BP(mean): --  RR: 16 (28 Dec 2019 08:12) (16 - 20)  SpO2: 99% (28 Dec 2019 08:12) (96% - 100%)    ________________________________________________  PHYSICAL EXAM:  GENERAL: NAD  HEENT: Normocephalic;  conjunctivae and sclerae clear; moist mucous membranes;   NECK : supple  CHEST/LUNG: Clear to auscultation bilaterally with good air entry   HEART: S1 S2  regular; no murmurs, gallops or rubs  ABDOMEN: Soft, Nontender, Nondistended; Bowel sounds present  EXTREMITIES: no cyanosis; no edema; no calf tenderness  SKIN: warm and dry; no rash  NERVOUS SYSTEM:  Awake and alert; Oriented  to place, person and time ; no new deficits    _________________________________________________  LABS:                        7.8    12.59 )-----------( 280      ( 28 Dec 2019 07:04 )             26.6         138  |  111<H>  |  15  ----------------------------<  267<H>  4.4   |  20<L>  |  2.10<H>    Ca    7.9<L>      28 Dec 2019 07:04  Phos  2.1       Mg     1.4         TPro  6.5  /  Alb  2.5<L>  /  TBili  1.0  /  DBili  x   /  AST  7<L>  /  ALT  12  /  AlkPhos  106        Urinalysis Basic - ( 27 Dec 2019 18:17 )    Color: Yellow / Appearance: Clear / S.010 / pH: x  Gluc: x / Ketone: Negative  / Bili: Negative / Urobili: Negative   Blood: x / Protein: 30 mg/dL / Nitrite: Negative   Leuk Esterase: Moderate / RBC: 10-25 /HPF / WBC 26-50 /HPF   Sq Epi: x / Non Sq Epi: Moderate /HPF / Bacteria: Many /HPF      CAPILLARY BLOOD GLUCOSE      POCT Blood Glucose.: 258 mg/dL (28 Dec 2019 07:46)  POCT Blood Glucose.: 189 mg/dL (28 Dec 2019 01:10)        RADIOLOGY & ADDITIONAL TESTS:    Imaging Personally Reviewed:  YES/NO    Consultant(s) Notes Reviewed:   YES/ No    Care Discussed with Consultants :     Plan of care was discussed with patient and /or primary care giver; all questions and concerns were addressed and care was aligned with patient's wishes. PGY 1 Note discussed with supervising resident and primary attending    Patient is a 39y old  Female who presents with a chief complaint of sepsis (27 Dec 2019 20:43)      INTERVAL HPI/OVERNIGHT EVENTS: Patient seen and examined at the bedside. Pt looks extremely tired and pale and c/o headache.     MEDICATIONS  (STANDING):  aspirin enteric coated 81 milliGRAM(s) Oral daily  atorvastatin 80 milliGRAM(s) Oral at bedtime  cefTRIAXone   IVPB 1000 milliGRAM(s) IV Intermittent every 24 hours  cefTRIAXone   IVPB      clopidogrel Tablet 75 milliGRAM(s) Oral daily  influenza   Vaccine 0.5 milliLiter(s) IntraMuscular once  insulin glargine Injectable (LANTUS) 15 Unit(s) SubCutaneous at bedtime  insulin lispro (HumaLOG) corrective regimen sliding scale   SubCutaneous Before meals and at bedtime  insulin lispro Injectable (HumaLOG) 5 Unit(s) SubCutaneous three times a day before meals  predniSONE   Tablet 5 milliGRAM(s) Oral daily  sodium chloride 0.9%. 1000 milliLiter(s) (100 mL/Hr) IV Continuous <Continuous>  tacrolimus 3 milliGRAM(s) Oral every 12 hours    MEDICATIONS  (PRN):  acetaminophen   Tablet .. 650 milliGRAM(s) Oral every 6 hours PRN Mild Pain (1 - 3)  traMADol 50 milliGRAM(s) Oral every 6 hours PRN Moderate Pain (4 - 6)      __________________________________________________  REVIEW OF SYSTEMS:    CONSTITUTIONAL: No fever, +fatigue, headache  EYES: no acute visual disturbances  NECK: No pain or stiffness  RESPIRATORY: No cough; No shortness of breath  CARDIOVASCULAR: No chest pain, no palpitations  GASTROINTESTINAL: No pain. No nausea or vomiting; No diarrhea   NEUROLOGICAL: No headache or numbness, no tremors  MUSCULOSKELETAL: No joint pain, no muscle pain  GENITOURINARY: no dysuria, no frequency, no hesitancy  PSYCHIATRY: no depression , no anxiety  ALL OTHER  ROS negative        Vital Signs Last 24 Hrs  T(C): 36.9 (28 Dec 2019 08:12), Max: 39.9 (27 Dec 2019 19:23)  T(F): 98.4 (28 Dec 2019 08:12), Max: 103.9 (27 Dec 2019 19:23)  HR: 93 (28 Dec 2019 08:12) (93 - 129)  BP: 124/81 (28 Dec 2019 08:12) (100/52 - 124/81)  BP(mean): --  RR: 16 (28 Dec 2019 08:12) (16 - 20)  SpO2: 99% (28 Dec 2019 08:12) (96% - 100%)    ________________________________________________  PHYSICAL EXAM:  GENERAL: NAD, pale looking young woman   HEENT: Normocephalic;  conjunctivae and sclerae clear; moist mucous membranes;   NECK : supple  CHEST/LUNG: Clear to auscultation bilaterally with good air entry   HEART: S1 S2  regular; no murmurs, gallops or rubs  ABDOMEN: Soft, Nontender, Nondistended; Bowel sounds present  EXTREMITIES: no cyanosis; no edema; no calf tenderness  SKIN: warm and dry; no rash  NERVOUS SYSTEM:  Awake and alert; Oriented  to place, person and time ; no new deficits    _________________________________________________  LABS:                        7.8    12.59 )-----------( 280      ( 28 Dec 2019 07:04 )             26.6         138  |  111<H>  |  15  ----------------------------<  267<H>  4.4   |  20<L>  |  2.10<H>    Ca    7.9<L>      28 Dec 2019 07:04  Phos  2.1       Mg     1.4         TPro  6.5  /  Alb  2.5<L>  /  TBili  1.0  /  DBili  x   /  AST  7<L>  /  ALT  12  /  AlkPhos  106        Urinalysis Basic - ( 27 Dec 2019 18:17 )    Color: Yellow / Appearance: Clear / S.010 / pH: x  Gluc: x / Ketone: Negative  / Bili: Negative / Urobili: Negative   Blood: x / Protein: 30 mg/dL / Nitrite: Negative   Leuk Esterase: Moderate / RBC: 10-25 /HPF / WBC 26-50 /HPF   Sq Epi: x / Non Sq Epi: Moderate /HPF / Bacteria: Many /HPF      CAPILLARY BLOOD GLUCOSE      POCT Blood Glucose.: 258 mg/dL (28 Dec 2019 07:46)  POCT Blood Glucose.: 189 mg/dL (28 Dec 2019 01:10)        RADIOLOGY & ADDITIONAL TESTS:    Imaging Personally Reviewed:  YES/NO    Consultant(s) Notes Reviewed:   YES/ No    Care Discussed with Consultants :     Plan of care was discussed with patient and /or primary care giver; all questions and concerns were addressed and care was aligned with patient's wishes.

## 2019-12-29 LAB
ALBUMIN SERPL ELPH-MCNC: 2.4 G/DL — LOW (ref 3.5–5)
ALP SERPL-CCNC: 116 U/L — SIGNIFICANT CHANGE UP (ref 40–120)
ALT FLD-CCNC: 16 U/L DA — SIGNIFICANT CHANGE UP (ref 10–60)
ANION GAP SERPL CALC-SCNC: 7 MMOL/L — SIGNIFICANT CHANGE UP (ref 5–17)
AST SERPL-CCNC: 12 U/L — SIGNIFICANT CHANGE UP (ref 10–40)
BILIRUB SERPL-MCNC: 0.5 MG/DL — SIGNIFICANT CHANGE UP (ref 0.2–1.2)
BUN SERPL-MCNC: 15 MG/DL — SIGNIFICANT CHANGE UP (ref 7–18)
CALCIUM SERPL-MCNC: 8.2 MG/DL — LOW (ref 8.4–10.5)
CHLORIDE SERPL-SCNC: 115 MMOL/L — HIGH (ref 96–108)
CO2 SERPL-SCNC: 20 MMOL/L — LOW (ref 22–31)
CREAT SERPL-MCNC: 1.86 MG/DL — HIGH (ref 0.5–1.3)
GLUCOSE BLDC GLUCOMTR-MCNC: 210 MG/DL — HIGH (ref 70–99)
GLUCOSE BLDC GLUCOMTR-MCNC: 218 MG/DL — HIGH (ref 70–99)
GLUCOSE BLDC GLUCOMTR-MCNC: 221 MG/DL — HIGH (ref 70–99)
GLUCOSE BLDC GLUCOMTR-MCNC: 261 MG/DL — HIGH (ref 70–99)
GLUCOSE BLDC GLUCOMTR-MCNC: 315 MG/DL — HIGH (ref 70–99)
GLUCOSE SERPL-MCNC: 188 MG/DL — HIGH (ref 70–99)
HCT VFR BLD CALC: 25.6 % — LOW (ref 34.5–45)
HCT VFR BLD CALC: 27.9 % — LOW (ref 34.5–45)
HGB BLD-MCNC: 7.4 G/DL — LOW (ref 11.5–15.5)
HGB BLD-MCNC: 8.2 G/DL — LOW (ref 11.5–15.5)
MAGNESIUM SERPL-MCNC: 1.6 MG/DL — SIGNIFICANT CHANGE UP (ref 1.6–2.6)
MCHC RBC-ENTMCNC: 24.9 PG — LOW (ref 27–34)
MCHC RBC-ENTMCNC: 25.2 PG — LOW (ref 27–34)
MCHC RBC-ENTMCNC: 28.9 GM/DL — LOW (ref 32–36)
MCHC RBC-ENTMCNC: 29.4 GM/DL — LOW (ref 32–36)
MCV RBC AUTO: 85.6 FL — SIGNIFICANT CHANGE UP (ref 80–100)
MCV RBC AUTO: 86.2 FL — SIGNIFICANT CHANGE UP (ref 80–100)
NRBC # BLD: 0 /100 WBCS — SIGNIFICANT CHANGE UP (ref 0–0)
NRBC # BLD: 0 /100 WBCS — SIGNIFICANT CHANGE UP (ref 0–0)
PHOSPHATE SERPL-MCNC: 2.6 MG/DL — SIGNIFICANT CHANGE UP (ref 2.5–4.5)
PLATELET # BLD AUTO: 235 K/UL — SIGNIFICANT CHANGE UP (ref 150–400)
PLATELET # BLD AUTO: 259 K/UL — SIGNIFICANT CHANGE UP (ref 150–400)
POTASSIUM SERPL-MCNC: 4.2 MMOL/L — SIGNIFICANT CHANGE UP (ref 3.5–5.3)
POTASSIUM SERPL-SCNC: 4.2 MMOL/L — SIGNIFICANT CHANGE UP (ref 3.5–5.3)
PROT SERPL-MCNC: 6.1 G/DL — SIGNIFICANT CHANGE UP (ref 6–8.3)
RBC # BLD: 2.97 M/UL — LOW (ref 3.8–5.2)
RBC # BLD: 3.26 M/UL — LOW (ref 3.8–5.2)
RBC # FLD: 20.1 % — HIGH (ref 10.3–14.5)
RBC # FLD: 20.3 % — HIGH (ref 10.3–14.5)
SODIUM SERPL-SCNC: 142 MMOL/L — SIGNIFICANT CHANGE UP (ref 135–145)
WBC # BLD: 8.66 K/UL — SIGNIFICANT CHANGE UP (ref 3.8–10.5)
WBC # BLD: 9.22 K/UL — SIGNIFICANT CHANGE UP (ref 3.8–10.5)
WBC # FLD AUTO: 8.66 K/UL — SIGNIFICANT CHANGE UP (ref 3.8–10.5)
WBC # FLD AUTO: 9.22 K/UL — SIGNIFICANT CHANGE UP (ref 3.8–10.5)

## 2019-12-29 RX ADMIN — Medication 100 MILLIGRAM(S): at 22:26

## 2019-12-29 RX ADMIN — Medication 650 MILLIGRAM(S): at 04:56

## 2019-12-29 RX ADMIN — MAGNESIUM OXIDE 400 MG ORAL TABLET 400 MILLIGRAM(S): 241.3 TABLET ORAL at 12:03

## 2019-12-29 RX ADMIN — Medication 5 MILLIGRAM(S): at 06:24

## 2019-12-29 RX ADMIN — Medication 2: at 08:07

## 2019-12-29 RX ADMIN — Medication 5 UNIT(S): at 12:04

## 2019-12-29 RX ADMIN — Medication 81 MILLIGRAM(S): at 12:05

## 2019-12-29 RX ADMIN — Medication 2: at 22:36

## 2019-12-29 RX ADMIN — INSULIN GLARGINE 15 UNIT(S): 100 INJECTION, SOLUTION SUBCUTANEOUS at 22:36

## 2019-12-29 RX ADMIN — CEFTRIAXONE 100 MILLIGRAM(S): 500 INJECTION, POWDER, FOR SOLUTION INTRAMUSCULAR; INTRAVENOUS at 23:45

## 2019-12-29 RX ADMIN — Medication 5 UNIT(S): at 08:07

## 2019-12-29 RX ADMIN — TACROLIMUS 3 MILLIGRAM(S): 5 CAPSULE ORAL at 06:24

## 2019-12-29 RX ADMIN — Medication 4: at 12:04

## 2019-12-29 RX ADMIN — ATORVASTATIN CALCIUM 80 MILLIGRAM(S): 80 TABLET, FILM COATED ORAL at 22:26

## 2019-12-29 RX ADMIN — CLOPIDOGREL BISULFATE 75 MILLIGRAM(S): 75 TABLET, FILM COATED ORAL at 12:05

## 2019-12-29 RX ADMIN — Medication 650 MILLIGRAM(S): at 06:30

## 2019-12-29 RX ADMIN — TACROLIMUS 3 MILLIGRAM(S): 5 CAPSULE ORAL at 17:23

## 2019-12-29 RX ADMIN — Medication 1 TABLET(S): at 12:03

## 2019-12-29 RX ADMIN — Medication 5 UNIT(S): at 17:23

## 2019-12-29 RX ADMIN — Medication 2: at 17:21

## 2019-12-29 RX ADMIN — IRON SUCROSE 210 MILLIGRAM(S): 20 INJECTION, SOLUTION INTRAVENOUS at 06:28

## 2019-12-29 NOTE — PROGRESS NOTE ADULT - SUBJECTIVE AND OBJECTIVE BOX
Patient is a 39y old  Female who presents with a chief complaint of sepsis (27 Dec 2019 20:43)      INTERVAL HPI/OVERNIGHT EVENTS: Patient seen and examined at the bedside. Pt looks extremely tired and pale and c/o headache.     MEDICATIONS  (STANDING):  aspirin enteric coated 81 milliGRAM(s) Oral daily  atorvastatin 80 milliGRAM(s) Oral at bedtime  cefTRIAXone   IVPB 1000 milliGRAM(s) IV Intermittent every 24 hours  cefTRIAXone   IVPB      clopidogrel Tablet 75 milliGRAM(s) Oral daily  influenza   Vaccine 0.5 milliLiter(s) IntraMuscular once  insulin glargine Injectable (LANTUS) 15 Unit(s) SubCutaneous at bedtime  insulin lispro (HumaLOG) corrective regimen sliding scale   SubCutaneous Before meals and at bedtime  insulin lispro Injectable (HumaLOG) 5 Unit(s) SubCutaneous three times a day before meals  iron sucrose IVPB 100 milliGRAM(s) IV Intermittent every 24 hours  predniSONE   Tablet 5 milliGRAM(s) Oral daily  sodium chloride 0.9%. 1000 milliLiter(s) (100 mL/Hr) IV Continuous <Continuous>  tacrolimus 3 milliGRAM(s) Oral every 12 hours    MEDICATIONS  (PRN):  acetaminophen   Tablet .. 650 milliGRAM(s) Oral every 6 hours PRN Mild Pain (1 - 3)  oxycodone    5 mG/acetaminophen 325 mG 1 Tablet(s) Oral every 6 hours PRN Moderate Pain (4 - 6)  traMADol 50 milliGRAM(s) Oral every 6 hours PRN Moderate Pain (4 - 6)    Vital Signs Last 24 Hrs  T(C): 36.3 (29 Dec 2019 16:02), Max: 37.7 (29 Dec 2019 05:14)  T(F): 97.4 (29 Dec 2019 16:02), Max: 99.8 (29 Dec 2019 05:14)  HR: 88 (29 Dec 2019 16:02) (77 - 109)  BP: 113/79 (29 Dec 2019 16:02) (102/70 - 124/77)  BP(mean): --  RR: 18 (29 Dec 2019 16:02) (18 - 18)  SpO2: 99% (29 Dec 2019 16:02) (97% - 100%)    CONSTITUTIONAL: No fever, +fatigue, headache  EYES: no acute visual disturbances  NECK: No pain or stiffness  RESPIRATORY: No cough; No shortness of breath  CARDIOVASCULAR: No chest pain, no palpitations  GASTROINTESTINAL: No pain. No nausea or vomiting; No diarrhea   NEUROLOGICAL: No headache or numbness, no tremors  MUSCULOSKELETAL: No joint pain, no muscle pain  GENITOURINARY: no dysuria, no frequency, no hesitancy  PSYCHIATRY: no depression , no anxiety  ALL OTHER  ROS negative        Vital Signs Last 24 Hrs  T(C): 36.9 (28 Dec 2019 08:12), Max: 39.9 (27 Dec 2019 19:23)  T(F): 98.4 (28 Dec 2019 08:12), Max: 103.9 (27 Dec 2019 19:23)  HR: 93 (28 Dec 2019 08:12) (93 - 129)  BP: 124/81 (28 Dec 2019 08:12) (100/52 - 124/81)  BP(mean): --  RR: 16 (28 Dec 2019 08:12) (16 - 20)  SpO2: 99% (28 Dec 2019 08:12) (96% - 100%)    ________________________________________________  PHYSICAL EXAM:  GENERAL: NAD, pale looking young woman   HEENT: Normocephalic;  conjunctivae and sclerae clear; moist mucous membranes;   NECK : supple  CHEST/LUNG: Clear to auscultation bilaterally with good air entry   HEART: S1 S2  regular; no murmurs, gallops or rubs  ABDOMEN: Soft, Nontender, Nondistended; Bowel sounds present  EXTREMITIES: no cyanosis; no edema; no calf tenderness  SKIN: warm and dry; no rash  NERVOUS SYSTEM:  Awake and alert; Oriented  to place, person and time ; no new deficits    _________________________________________________  LABS:  -    142  |  115<H>  |  15  ----------------------------<  188<H>  4.2   |  20<L>  |  1.86<H>    Ca    8.2<L>      29 Dec 2019 07:16  Phos  2.6       Mg     1.6         TPro  6.1  /  Alb  2.4<L>  /  TBili  0.5  /  DBili      /  AST  12  /  ALT  16  /  AlkPhos  116      Creatinine Trend: 1.86 <--, 2.10 <--, 1.88 <--, 2.14 <--                        7.4    9.22  )-----------( 235      ( 29 Dec 2019 07:16 )             25.6     Urine Studies:  Urinalysis Basic - ( 27 Dec 2019 18:17 )    Color: Yellow / Appearance: Clear / S.010 / pH:   Gluc:  / Ketone: Negative  / Bili: Negative / Urobili: Negative   Blood:  / Protein: 30 mg/dL / Nitrite: Negative   Leuk Esterase: Moderate / RBC: 10-25 /HPF / WBC 26-50 /HPF   Sq Epi:  / Non Sq Epi: Moderate /HPF / Bacteria: Many /HPF        CARDIAC MARKERS ( 28 Dec 2019 07:04 )  <0.015 ng/mL / x     / x     / x     / x      CARDIAC MARKERS ( 27 Dec 2019 22:47 )  <0.015 ng/mL / x     / x     / x     / x      CARDIAC MARKERS ( 27 Dec 2019 18:17 )  <0.015 ng/mL / x     / x     / x     / x            LIVER FUNCTIONS - ( 29 Dec 2019 07:16 )  Alb: 2.4 g/dL / Pro: 6.1 g/dL / ALK PHOS: 116 U/L / ALT: 16 U/L DA / AST: 12 U/L / GGT: x                Blood: x / Protein: 30 mg/dL / Nitrite: Negative   Leuk Esterase: Moderate / RBC: 10-25 /HPF / WBC 26-50 /HPF   Sq Epi: x / Non Sq Epi: Moderate /HPF / Bacteria: Many /HPF      CAPILLARY BLOOD GLUCOSE      POCT Blood Glucose.: 258 mg/dL (28 Dec 2019 07:46)  POCT Blood Glucose.: 189 mg/dL (28 Dec 2019 01:10)        RADIOLOGY & ADDITIONAL TESTS:    Imaging Personally Reviewed:  YES/NO    Consultant(s) Notes Reviewed:   YES/ No    Care Discussed with Consultants :     Plan of care was discussed with patient and /or primary care giver; all questions and concerns were addressed and care was aligned with patient's wishes.

## 2019-12-29 NOTE — PROGRESS NOTE ADULT - PROBLEM SELECTOR PLAN 2
u/a positive, asymptomatic pt, lactate 3.2   lactate normal 0.9 now  c/w rocephin   pt remains afebrile  f/u urine culture and blood culture

## 2019-12-29 NOTE — PROGRESS NOTE ADULT - PROBLEM SELECTOR PLAN 1
anemia from heavy menstruation   pt has very low iron and iron sat, normal ferritin  Hb 6.7->7.8 s/p 2 unit pRBCs yest  f/u repeat cbc  continue to monitor Hb   f/u occult blood  f/u usg pelvis

## 2019-12-29 NOTE — PROGRESS NOTE ADULT - PROBLEM SELECTOR PLAN 3
Chest pain, unspecified type.  Plan: pt has sob and chest pressure   EKG sinus tachy  trop negative   will monitor on tele to r/o acs  c/w plavix, asa, bb, lipitor.   f/u echo

## 2019-12-30 ENCOUNTER — TRANSCRIPTION ENCOUNTER (OUTPATIENT)
Age: 39
End: 2019-12-30

## 2019-12-30 VITALS
TEMPERATURE: 98 F | DIASTOLIC BLOOD PRESSURE: 61 MMHG | HEART RATE: 69 BPM | OXYGEN SATURATION: 96 % | RESPIRATION RATE: 16 BRPM | SYSTOLIC BLOOD PRESSURE: 105 MMHG

## 2019-12-30 DIAGNOSIS — E11.65 TYPE 2 DIABETES MELLITUS WITH HYPERGLYCEMIA: ICD-10-CM

## 2019-12-30 LAB
-  AMIKACIN: SIGNIFICANT CHANGE UP
-  AMPICILLIN/SULBACTAM: SIGNIFICANT CHANGE UP
-  AMPICILLIN: SIGNIFICANT CHANGE UP
-  AZTREONAM: SIGNIFICANT CHANGE UP
-  CEFAZOLIN: SIGNIFICANT CHANGE UP
-  CEFEPIME: SIGNIFICANT CHANGE UP
-  CEFOXITIN: SIGNIFICANT CHANGE UP
-  CEFTRIAXONE: SIGNIFICANT CHANGE UP
-  CIPROFLOXACIN: SIGNIFICANT CHANGE UP
-  GENTAMICIN: SIGNIFICANT CHANGE UP
-  IMIPENEM: SIGNIFICANT CHANGE UP
-  LEVOFLOXACIN: SIGNIFICANT CHANGE UP
-  MEROPENEM: SIGNIFICANT CHANGE UP
-  NITROFURANTOIN: SIGNIFICANT CHANGE UP
-  PIPERACILLIN/TAZOBACTAM: SIGNIFICANT CHANGE UP
-  TIGECYCLINE: SIGNIFICANT CHANGE UP
-  TOBRAMYCIN: SIGNIFICANT CHANGE UP
-  TRIMETHOPRIM/SULFAMETHOXAZOLE: SIGNIFICANT CHANGE UP
ALBUMIN SERPL ELPH-MCNC: 2.5 G/DL — LOW (ref 3.5–5)
ALP SERPL-CCNC: 138 U/L — HIGH (ref 40–120)
ALT FLD-CCNC: 18 U/L DA — SIGNIFICANT CHANGE UP (ref 10–60)
ANION GAP SERPL CALC-SCNC: 7 MMOL/L — SIGNIFICANT CHANGE UP (ref 5–17)
AST SERPL-CCNC: 11 U/L — SIGNIFICANT CHANGE UP (ref 10–40)
BILIRUB SERPL-MCNC: 0.3 MG/DL — SIGNIFICANT CHANGE UP (ref 0.2–1.2)
BUN SERPL-MCNC: 15 MG/DL — SIGNIFICANT CHANGE UP (ref 7–18)
CALCIUM SERPL-MCNC: 8.3 MG/DL — LOW (ref 8.4–10.5)
CHLORIDE SERPL-SCNC: 110 MMOL/L — HIGH (ref 96–108)
CO2 SERPL-SCNC: 22 MMOL/L — SIGNIFICANT CHANGE UP (ref 22–31)
CREAT SERPL-MCNC: 1.96 MG/DL — HIGH (ref 0.5–1.3)
CULTURE RESULTS: SIGNIFICANT CHANGE UP
GLUCOSE BLDC GLUCOMTR-MCNC: 233 MG/DL — HIGH (ref 70–99)
GLUCOSE BLDC GLUCOMTR-MCNC: 252 MG/DL — HIGH (ref 70–99)
GLUCOSE BLDC GLUCOMTR-MCNC: 308 MG/DL — HIGH (ref 70–99)
GLUCOSE SERPL-MCNC: 271 MG/DL — HIGH (ref 70–99)
HCT VFR BLD CALC: 26.8 % — LOW (ref 34.5–45)
HGB BLD-MCNC: 7.7 G/DL — LOW (ref 11.5–15.5)
MAGNESIUM SERPL-MCNC: 1.5 MG/DL — LOW (ref 1.6–2.6)
MCHC RBC-ENTMCNC: 24.6 PG — LOW (ref 27–34)
MCHC RBC-ENTMCNC: 28.7 GM/DL — LOW (ref 32–36)
MCV RBC AUTO: 85.6 FL — SIGNIFICANT CHANGE UP (ref 80–100)
METHOD TYPE: SIGNIFICANT CHANGE UP
NRBC # BLD: 0 /100 WBCS — SIGNIFICANT CHANGE UP (ref 0–0)
ORGANISM # SPEC MICROSCOPIC CNT: SIGNIFICANT CHANGE UP
ORGANISM # SPEC MICROSCOPIC CNT: SIGNIFICANT CHANGE UP
PHOSPHATE SERPL-MCNC: 2.1 MG/DL — LOW (ref 2.5–4.5)
PLATELET # BLD AUTO: 245 K/UL — SIGNIFICANT CHANGE UP (ref 150–400)
POTASSIUM SERPL-MCNC: 4.1 MMOL/L — SIGNIFICANT CHANGE UP (ref 3.5–5.3)
POTASSIUM SERPL-SCNC: 4.1 MMOL/L — SIGNIFICANT CHANGE UP (ref 3.5–5.3)
PROT SERPL-MCNC: 6.8 G/DL — SIGNIFICANT CHANGE UP (ref 6–8.3)
RBC # BLD: 3.13 M/UL — LOW (ref 3.8–5.2)
RBC # FLD: 20.5 % — HIGH (ref 10.3–14.5)
SODIUM SERPL-SCNC: 139 MMOL/L — SIGNIFICANT CHANGE UP (ref 135–145)
SPECIMEN SOURCE: SIGNIFICANT CHANGE UP
WBC # BLD: 6.89 K/UL — SIGNIFICANT CHANGE UP (ref 3.8–10.5)
WBC # FLD AUTO: 6.89 K/UL — SIGNIFICANT CHANGE UP (ref 3.8–10.5)

## 2019-12-30 PROCEDURE — 76830 TRANSVAGINAL US NON-OB: CPT | Mod: 26

## 2019-12-30 PROCEDURE — 81025 URINE PREGNANCY TEST: CPT

## 2019-12-30 PROCEDURE — 80053 COMPREHEN METABOLIC PANEL: CPT

## 2019-12-30 PROCEDURE — 70450 CT HEAD/BRAIN W/O DYE: CPT

## 2019-12-30 PROCEDURE — 87040 BLOOD CULTURE FOR BACTERIA: CPT

## 2019-12-30 PROCEDURE — 36430 TRANSFUSION BLD/BLD COMPNT: CPT

## 2019-12-30 PROCEDURE — 71045 X-RAY EXAM CHEST 1 VIEW: CPT

## 2019-12-30 PROCEDURE — 86850 RBC ANTIBODY SCREEN: CPT

## 2019-12-30 PROCEDURE — 83690 ASSAY OF LIPASE: CPT

## 2019-12-30 PROCEDURE — 80197 ASSAY OF TACROLIMUS: CPT

## 2019-12-30 PROCEDURE — 76856 US EXAM PELVIC COMPLETE: CPT | Mod: 26,59

## 2019-12-30 PROCEDURE — 83605 ASSAY OF LACTIC ACID: CPT

## 2019-12-30 PROCEDURE — 83036 HEMOGLOBIN GLYCOSYLATED A1C: CPT

## 2019-12-30 PROCEDURE — 83550 IRON BINDING TEST: CPT

## 2019-12-30 PROCEDURE — 76830 TRANSVAGINAL US NON-OB: CPT

## 2019-12-30 PROCEDURE — 87631 RESP VIRUS 3-5 TARGETS: CPT

## 2019-12-30 PROCEDURE — 36415 COLL VENOUS BLD VENIPUNCTURE: CPT

## 2019-12-30 PROCEDURE — 87186 SC STD MICRODIL/AGAR DIL: CPT

## 2019-12-30 PROCEDURE — 83540 ASSAY OF IRON: CPT

## 2019-12-30 PROCEDURE — 84443 ASSAY THYROID STIM HORMONE: CPT

## 2019-12-30 PROCEDURE — 86901 BLOOD TYPING SEROLOGIC RH(D): CPT

## 2019-12-30 PROCEDURE — 83735 ASSAY OF MAGNESIUM: CPT

## 2019-12-30 PROCEDURE — 99291 CRITICAL CARE FIRST HOUR: CPT | Mod: 25

## 2019-12-30 PROCEDURE — P9040: CPT

## 2019-12-30 PROCEDURE — 84100 ASSAY OF PHOSPHORUS: CPT

## 2019-12-30 PROCEDURE — 76856 US EXAM PELVIC COMPLETE: CPT

## 2019-12-30 PROCEDURE — 90686 IIV4 VACC NO PRSV 0.5 ML IM: CPT

## 2019-12-30 PROCEDURE — 85027 COMPLETE CBC AUTOMATED: CPT

## 2019-12-30 PROCEDURE — 87086 URINE CULTURE/COLONY COUNT: CPT

## 2019-12-30 PROCEDURE — 93005 ELECTROCARDIOGRAM TRACING: CPT

## 2019-12-30 PROCEDURE — 80061 LIPID PANEL: CPT

## 2019-12-30 PROCEDURE — 86923 COMPATIBILITY TEST ELECTRIC: CPT

## 2019-12-30 PROCEDURE — 86900 BLOOD TYPING SEROLOGIC ABO: CPT

## 2019-12-30 PROCEDURE — 84702 CHORIONIC GONADOTROPIN TEST: CPT

## 2019-12-30 PROCEDURE — 82962 GLUCOSE BLOOD TEST: CPT

## 2019-12-30 PROCEDURE — 80048 BASIC METABOLIC PNL TOTAL CA: CPT

## 2019-12-30 PROCEDURE — 93306 TTE W/DOPPLER COMPLETE: CPT

## 2019-12-30 PROCEDURE — 82803 BLOOD GASES ANY COMBINATION: CPT

## 2019-12-30 PROCEDURE — 84484 ASSAY OF TROPONIN QUANT: CPT

## 2019-12-30 PROCEDURE — 81001 URINALYSIS AUTO W/SCOPE: CPT

## 2019-12-30 PROCEDURE — 82728 ASSAY OF FERRITIN: CPT

## 2019-12-30 RX ORDER — POTASSIUM PHOSPHATE, MONOBASIC POTASSIUM PHOSPHATE, DIBASIC 236; 224 MG/ML; MG/ML
15 INJECTION, SOLUTION INTRAVENOUS ONCE
Refills: 0 | Status: DISCONTINUED | OUTPATIENT
Start: 2019-12-30 | End: 2019-12-30

## 2019-12-30 RX ORDER — MAGNESIUM OXIDE 400 MG ORAL TABLET 241.3 MG
400 TABLET ORAL
Refills: 0 | Status: DISCONTINUED | OUTPATIENT
Start: 2019-12-30 | End: 2019-12-30

## 2019-12-30 RX ORDER — ASCORBIC ACID 60 MG
1 TABLET,CHEWABLE ORAL
Qty: 21 | Refills: 0
Start: 2019-12-30 | End: 2020-01-19

## 2019-12-30 RX ORDER — BENZOCAINE AND MENTHOL 5; 1 G/100ML; G/100ML
1 LIQUID ORAL THREE TIMES A DAY
Refills: 0 | Status: DISCONTINUED | OUTPATIENT
Start: 2019-12-30 | End: 2019-12-30

## 2019-12-30 RX ORDER — MAGNESIUM OXIDE 400 MG ORAL TABLET 241.3 MG
1 TABLET ORAL
Qty: 6 | Refills: 0
Start: 2019-12-30 | End: 2019-12-31

## 2019-12-30 RX ORDER — INSULIN LISPRO 100/ML
8 VIAL (ML) SUBCUTANEOUS
Refills: 0 | Status: DISCONTINUED | OUTPATIENT
Start: 2019-12-30 | End: 2019-12-30

## 2019-12-30 RX ORDER — CIPROFLOXACIN LACTATE 400MG/40ML
1 VIAL (ML) INTRAVENOUS
Qty: 10 | Refills: 0
Start: 2019-12-30 | End: 2020-01-03

## 2019-12-30 RX ORDER — INSULIN GLARGINE 100 [IU]/ML
25 INJECTION, SOLUTION SUBCUTANEOUS AT BEDTIME
Refills: 0 | Status: DISCONTINUED | OUTPATIENT
Start: 2019-12-30 | End: 2019-12-30

## 2019-12-30 RX ORDER — MAGNESIUM SULFATE 500 MG/ML
1 VIAL (ML) INJECTION ONCE
Refills: 0 | Status: DISCONTINUED | OUTPATIENT
Start: 2019-12-30 | End: 2019-12-30

## 2019-12-30 RX ORDER — FERROUS SULFATE 325(65) MG
1 TABLET ORAL
Qty: 21 | Refills: 0
Start: 2019-12-30 | End: 2020-01-19

## 2019-12-30 RX ORDER — SODIUM,POTASSIUM PHOSPHATES 278-250MG
1 POWDER IN PACKET (EA) ORAL THREE TIMES A DAY
Refills: 0 | Status: DISCONTINUED | OUTPATIENT
Start: 2019-12-30 | End: 2019-12-30

## 2019-12-30 RX ADMIN — TACROLIMUS 3 MILLIGRAM(S): 5 CAPSULE ORAL at 06:09

## 2019-12-30 RX ADMIN — Medication 81 MILLIGRAM(S): at 13:44

## 2019-12-30 RX ADMIN — Medication 8 UNIT(S): at 13:42

## 2019-12-30 RX ADMIN — Medication 2: at 17:06

## 2019-12-30 RX ADMIN — IRON SUCROSE 210 MILLIGRAM(S): 20 INJECTION, SOLUTION INTRAVENOUS at 06:09

## 2019-12-30 RX ADMIN — TACROLIMUS 3 MILLIGRAM(S): 5 CAPSULE ORAL at 17:06

## 2019-12-30 RX ADMIN — Medication 5 UNIT(S): at 08:08

## 2019-12-30 RX ADMIN — Medication 8 UNIT(S): at 17:07

## 2019-12-30 RX ADMIN — BENZOCAINE AND MENTHOL 1 LOZENGE: 5; 1 LIQUID ORAL at 07:47

## 2019-12-30 RX ADMIN — Medication 4: at 13:43

## 2019-12-30 RX ADMIN — Medication 3: at 08:08

## 2019-12-30 RX ADMIN — MAGNESIUM OXIDE 400 MG ORAL TABLET 400 MILLIGRAM(S): 241.3 TABLET ORAL at 17:06

## 2019-12-30 RX ADMIN — CLOPIDOGREL BISULFATE 75 MILLIGRAM(S): 75 TABLET, FILM COATED ORAL at 13:44

## 2019-12-30 RX ADMIN — INFLUENZA VIRUS VACCINE 0.5 MILLILITER(S): 15; 15; 15; 15 SUSPENSION INTRAMUSCULAR at 18:36

## 2019-12-30 RX ADMIN — Medication 5 MILLIGRAM(S): at 06:09

## 2019-12-30 NOTE — DISCHARGE NOTE PROVIDER - HOSPITAL COURSE
Patient is 39 year old Female with a significant PMHx of DM, MI, coronary stent, anemia, recent blood transfusion in July and significant PSHx of kidney transplant presents to the ED with c/o headache, nausea, cough, chest tightness like "someone is sitting on her chest". Patient reports that the nonbloody vomiting (salad came out), non bloody diarrhea started around 6 am in the morning  and the vomiting resolved around 9 am. Patient states she had salad yesterday in the lunch with colleagues. Patient states that a similar headache occurred in the past and it is recurring. Pt states she is also having difficulty breathing and she becomes easily fatigue. Pt states she has a hx of anemia in the past 2/2 to heavy menstruation, she finished her periods today. Pt was told her blood count was 7.2 a week ago. pt had last transfusion in july 2019 for anemia. Pt states she feels her left leg is feeling numb since she came in to the hospital.          Anemia         Patient was admitted to the inpatient medical unit with anemia secondary to suspected heavy menstruation with low levels of iron, iron saturation, and low ferritin. Patient received 2 units of pRBCs and was placed on IV oral iron sucrose. Patient was discharged with a hemoglobin of 7.7 and was instructed to follow-up with Hematology and GYN to assess her anemia.         UTI        Patient was found to have a UTI that was caused by Klepbsiella pneumoniae. Patient was placed on Ceftriaxone IV and was later transitioned to take Ciprofloxacin 250 mg BID for 5 days to complete course of treatment.         ACS        Patient was found to have shortness of breath and chest pressure. EKG showed sinus tachycardia with negative troponins. Patient was placed on aspirin, plavix, metoprolol, and Lipitor for precautions and an ECHO was done which showed a normal EF.         Renal Transplant        Patient recently received a renal transplant and was placed on tacrolimus and prednisone 5 mg daily and was instructed to continue taking these medications.

## 2019-12-30 NOTE — PROGRESS NOTE ADULT - PROBLEM SELECTOR PLAN 9
Holding dvt ppx given anemia .
Holding dvt ppx given anemia of 6.7
Holding dvt ppx given anemia of 6.7

## 2019-12-30 NOTE — PROGRESS NOTE ADULT - PROBLEM SELECTOR PLAN 2
u/a positive, asymptomatic pt, lactate 3.2   lactate normal 0.9 now  c/w rocephin   pt remains afebrile  Urine cultures grew gram negative rods, sensitivities pending. on Rocephin.

## 2019-12-30 NOTE — PROGRESS NOTE ADULT - SUBJECTIVE AND OBJECTIVE BOX
PGY 1 Note discussed with supervising resident and primary attending    Patient is a 39y old  Female who presents with a chief complaint of sepsis (30 Dec 2019 10:05)      INTERVAL HPI/OVERNIGHT EVENTS: offers no new complaints; current symptoms resolving    MEDICATIONS  (STANDING):  aspirin enteric coated 81 milliGRAM(s) Oral daily  atorvastatin 80 milliGRAM(s) Oral at bedtime  cefTRIAXone   IVPB 1000 milliGRAM(s) IV Intermittent every 24 hours  cefTRIAXone   IVPB      clopidogrel Tablet 75 milliGRAM(s) Oral daily  influenza   Vaccine 0.5 milliLiter(s) IntraMuscular once  insulin glargine Injectable (LANTUS) 25 Unit(s) SubCutaneous at bedtime  insulin lispro (HumaLOG) corrective regimen sliding scale   SubCutaneous Before meals and at bedtime  insulin lispro Injectable (HumaLOG) 8 Unit(s) SubCutaneous three times a day before meals  iron sucrose IVPB 100 milliGRAM(s) IV Intermittent every 24 hours  magnesium sulfate  IVPB 1 Gram(s) IV Intermittent once  potassium phosphate IVPB 15 milliMole(s) IV Intermittent once  predniSONE   Tablet 5 milliGRAM(s) Oral daily  sodium chloride 0.9%. 1000 milliLiter(s) (100 mL/Hr) IV Continuous <Continuous>  tacrolimus 3 milliGRAM(s) Oral every 12 hours    MEDICATIONS  (PRN):  acetaminophen   Tablet .. 650 milliGRAM(s) Oral every 6 hours PRN Mild Pain (1 - 3)  benzocaine 15 mG/menthol 3.6 mG (Sugar-Free) Lozenge 1 Lozenge Oral three times a day PRN Sore Throat  guaiFENesin   Syrup  (Sugar-Free) 100 milliGRAM(s) Oral every 6 hours PRN Cough  oxycodone    5 mG/acetaminophen 325 mG 1 Tablet(s) Oral every 6 hours PRN Moderate Pain (4 - 6)      __________________________________________________  REVIEW OF SYSTEMS:    CONSTITUTIONAL: No fever,   EYES: no acute visual disturbances  NECK: No pain or stiffness  RESPIRATORY: No cough; No shortness of breath  CARDIOVASCULAR: No chest pain, no palpitations  GASTROINTESTINAL: No pain. No nausea or vomiting; No diarrhea   NEUROLOGICAL: No headache or numbness, no tremors  MUSCULOSKELETAL: No joint pain, no muscle pain  GENITOURINARY: no dysuria, no frequency, no hesitancy  PSYCHIATRY: no depression , no anxiety  ALL OTHER  ROS negative        Vital Signs Last 24 Hrs  T(C): 36.7 (30 Dec 2019 11:14), Max: 37.3 (30 Dec 2019 00:04)  T(F): 98.1 (30 Dec 2019 11:14), Max: 99.1 (30 Dec 2019 00:04)  HR: 75 (30 Dec 2019 11:14) (75 - 92)  BP: 125/79 (30 Dec 2019 11:14) (99/70 - 126/73)  BP(mean): --  RR: 16 (30 Dec 2019 11:14) (16 - 18)  SpO2: 100% (30 Dec 2019 11:14) (98% - 100%)    ________________________________________________  PHYSICAL EXAM:  GENERAL: NAD  HEENT: Normocephalic;  conjunctivae and sclerae clear; moist mucous membranes;   NECK : supple  CHEST/LUNG: Clear to auscultation bilaterally with good air entry   HEART: S1 S2  regular; no murmurs, gallops or rubs  ABDOMEN: Soft, Nontender, Nondistended; Bowel sounds present  EXTREMITIES: no cyanosis; no edema; no calf tenderness  SKIN: warm and dry; no rash  NERVOUS SYSTEM:  Awake and alert; Oriented  to place, person and time ; no new deficits    _________________________________________________  LABS:                        7.7    6.89  )-----------( 245      ( 30 Dec 2019 08:50 )             26.8     12-30    139  |  110<H>  |  15  ----------------------------<  271<H>  4.1   |  22  |  1.96<H>    Ca    8.3<L>      30 Dec 2019 08:50  Phos  2.1     12-30  Mg     1.5     12-30    TPro  6.8  /  Alb  2.5<L>  /  TBili  0.3  /  DBili  x   /  AST  11  /  ALT  18  /  AlkPhos  138<H>  12-30        CAPILLARY BLOOD GLUCOSE      POCT Blood Glucose.: 252 mg/dL (30 Dec 2019 07:50)  POCT Blood Glucose.: 221 mg/dL (29 Dec 2019 22:29)  POCT Blood Glucose.: 261 mg/dL (29 Dec 2019 21:11)  POCT Blood Glucose.: 218 mg/dL (29 Dec 2019 16:49)        RADIOLOGY & ADDITIONAL TESTS:    Imaging Personally Reviewed:  YES/NO    Consultant(s) Notes Reviewed:   YES/ No    Care Discussed with Consultants :     Plan of care was discussed with patient and /or primary care giver; all questions and concerns were addressed and care was aligned with patient's wishes.

## 2019-12-30 NOTE — PROGRESS NOTE ADULT - PROBLEM SELECTOR PLAN 3
Chest pain, unspecified type.  Plan: pt has sob and chest pressure   EKG sinus tachy  trop negative   will monitor on tele to r/o acs  c/w plavix, asa, bb, lipitor.   ECho done and notd , normal EF

## 2019-12-30 NOTE — CONSULT NOTE ADULT - ASSESSMENT
Patient is 39 year old Female with a significant PMHx of DM, MI, coronary stent, anemia, recent blood transfusion in July and significant PSHx of kidney transplant presents to the ED with c/o headache, nausea, cough, chest tightness. Found to have un cont dm. Pt admits occ non compliance with insulin . On victoza qd as out pt and basal bolus insulin, Denies hypoglycemia.

## 2019-12-30 NOTE — DISCHARGE NOTE NURSING/CASE MANAGEMENT/SOCIAL WORK - PATIENT PORTAL LINK FT
You can access the FollowMyHealth Patient Portal offered by [facility] by registering at the following website: [email or web address]. By joining [employer]’s FollowMyHealth portal, you will also be able to view your health information using other applications (apps) compatible with our system.

## 2019-12-30 NOTE — DISCHARGE NOTE PROVIDER - CARE PROVIDER_API CALL
hemal healy  [address] [place] [address]  Phone: [phone]  Fax: (   )    -  Follow Up Time: 2 weeks    Kalpesh Andrade)  Obstetrics and Gynecology  [facility] [address] [place] [address] [place] [address]  Phone: [phone]  Fax: [phone]  Follow Up Time: 2 weeks    Yolie Duarte)  EndocrinologyMetabDiabetes  [address] [place] [address]  Phone: [phone]  Fax: [phone]  Follow Up Time: 2 weeks

## 2019-12-30 NOTE — DISCHARGE NOTE PROVIDER - PROVIDER TOKENS
FREE:[LAST:[shun],FIRST:[hemal],PHONE:[phone]],FAX:[(   )    -],ADDRESS:[[address] [place] [address]],FOLLOWUP:[2 weeks]],PROVIDER:[TOKEN:[[id number]],FOLLOWUP:[2 weeks]],PROVIDER:[TOKEN:[[id number]],FOLLOWUP:[2 weeks]]

## 2019-12-30 NOTE — DISCHARGE NOTE PROVIDER - NSDCMRMEDTOKEN_GEN_ALL_CORE_FT
aspirin 81 mg oral tablet: 1 tab(s) orally once a day  atorvastatin 80 mg oral tablet: 1 tab(s) orally once a day  Cipro 250 mg oral tablet: 1 tab(s) orally 2 times a day   HumaLOG Cartridge 100 units/mL subcutaneous solution: 15 unit(s) subcutaneous 3 times a day (before meals)   Lantus Solostar Pen 100 units/mL subcutaneous solution: 32 unit(s) subcutaneous once a day (at bedtime)  metoprolol succinate 25 mg oral tablet, extended release: 1 tab(s) orally once a day  Plavix 75 mg oral tablet: 1 tab(s) orally once a day  predniSONE 5 mg oral tablet: 1 tab(s) orally once a day  Prograf 1 mg oral capsule: 3 cap(s) orally every 12 hours aspirin 81 mg oral tablet: 1 tab(s) orally once a day  atorvastatin 80 mg oral tablet: 1 tab(s) orally once a day  Cipro 250 mg oral tablet: 1 tab(s) orally 2 times a day   ferrous sulfate 325 mg (65 mg elemental iron) oral delayed release tablet: 1 tab(s) orally once a day   HumaLOG Cartridge 100 units/mL subcutaneous solution: 15 unit(s) subcutaneous 3 times a day (before meals)   Lantus Solostar Pen 100 units/mL subcutaneous solution: 32 unit(s) subcutaneous once a day (at bedtime)  magnesium oxide 400 mg (240 mg elemental magnesium) oral tablet: 1 tab(s) orally 3 times a day (with meals)   metoprolol succinate 25 mg oral tablet, extended release: 1 tab(s) orally once a day  Plavix 75 mg oral tablet: 1 tab(s) orally once a day  predniSONE 5 mg oral tablet: 1 tab(s) orally once a day  Prograf 1 mg oral capsule: 3 cap(s) orally every 12 hours  Vitamin C 500 mg oral tablet: 1 tab(s) orally once a day

## 2019-12-30 NOTE — PROGRESS NOTE ADULT - PROBLEM SELECTOR PLAN 1
anemia from heavy menstruation   pt has very low iron and iron sat, normal ferritin  Hb 6.7->7.8 s/p 2 unit pRBCs yest  HB today is 7.7  continue to monitor Hb   f/u occult blood  f/u usg pelvis

## 2019-12-30 NOTE — DISCHARGE NOTE PROVIDER - CARE PROVIDERS DIRECT ADDRESSES
,DirectAddress_Unknown,[email or web address],DirectAddress_Unknown

## 2019-12-30 NOTE — PROGRESS NOTE ADULT - PROBLEM SELECTOR PLAN 7
Endo dinorah noted.  Lantus changed to 25 and humalog to 8 units tis and HSS.  Hemoglobin A1C, Whole Blood: 8.5: Method: Immunoassay.  Endo- dr Duarte
pt takes lantus 32, novolog 15 tid  uncontrolled  endo eval
pt takes lantus 32, novolog 15 tid

## 2019-12-30 NOTE — CONSULT NOTE ADULT - PROBLEM SELECTOR RECOMMENDATION 9
with hyperglycemia  change lantus to 25 units  humalog 8 ac tid  fsg ac and hs  restart victoza upon d/c home  f/u with pvt endo as out pt

## 2019-12-30 NOTE — DISCHARGE NOTE PROVIDER - NSDCCPCAREPLAN_GEN_ALL_CORE_FT
PRINCIPAL DISCHARGE DIAGNOSIS  Diagnosis: Anemia  Assessment and Plan of Treatment: You were admitted to the inpatient medical unit with anemia secondary to suspected heavy menstruation with low levels of iron, iron saturation, and low ferritin. You received 2 units of blood and were placed on IV  iron sucrose. You were discharged with a hemoglobin of 7.7 and are instructed to follow-up with Hematology and GYN to assess your anemia within 1-2 weeks.      SECONDARY DISCHARGE DIAGNOSES  Diagnosis: UTI (urinary tract infection)  Assessment and Plan of Treatment: You were found to have a UTI that was caused by Klebsiella pneumoniae. You were placed on Ceftriaxone IV and were later transitioned to take Ciprofloxacin 250 mg BID for 5 days to complete course of treatment.    Diagnosis: Chest pain, unspecified type  Assessment and Plan of Treatment: You were found to have shortness of breath and chest pressure. EKG showed sinus tachycardia with negative troponins. You were placed on aspirin, plavix, metoprolol, and Lipitor for precautions and an ECHO was done which showed a normal EF. Please follow up with your primary care provider or cardiologist in the next 1-2 weeks.    Diagnosis: Kidney transplant recipient  Assessment and Plan of Treatment: You recently received a renal transplant and were placed on tacrolimus and prednisone 5 mg daily and are instructed to continue taking these medications. Please follow up with Endocrinology in 1-2 weeks.

## 2019-12-30 NOTE — CONSULT NOTE ADULT - SUBJECTIVE AND OBJECTIVE BOX
Patient is a 39y old  Female who presents with a chief complaint of sepsis (29 Dec 2019 10:37)      HPI:  Patient is 39 year old Female with a significant PMHx of DM, MI, coronary stent, anemia, recent blood transfusion in July and significant PSHx of kidney transplant presents to the ED with c/o headache, nausea, cough, chest tightness like "someone is sitting on her chest". Patient reports that the nonbloody vomiting (salad came out), non bloody diarrhea started around 6 am in the morning  and the vomiting resolved around 9 am. Patient states she had salad yesterday in the lunch with colleagues. Patient states that a similar headache occurred in the past and it is recurring. Pt states she is also having difficulty breathing and she becomes easily fatigue. Pt states she has a hx of anemia in the past 2/2 to heavy menstruation, she finished her periods today. Pt was told her blood count was 7.2 a week ago. pt had last transfusion in july 2019 for anemia. Pt states she feels her left leg is feeling numb since she came in to the hospital. (27 Dec 2019 20:43). Found to have un cont dm. Pt admits occ non compliance with insulin . On victoza qd as out pt and basal bolus insulin, Denies hypoglycemia.       PAST MEDICAL & SURGICAL HISTORY:  CAD (coronary artery disease)  Kidney transplant recipient  DM (diabetes mellitus)  No significant past surgical history         MEDICATIONS  (STANDING):  aspirin enteric coated 81 milliGRAM(s) Oral daily  atorvastatin 80 milliGRAM(s) Oral at bedtime  cefTRIAXone   IVPB 1000 milliGRAM(s) IV Intermittent every 24 hours  cefTRIAXone   IVPB      clopidogrel Tablet 75 milliGRAM(s) Oral daily  influenza   Vaccine 0.5 milliLiter(s) IntraMuscular once  insulin glargine Injectable (LANTUS) 15 Unit(s) SubCutaneous at bedtime  insulin lispro (HumaLOG) corrective regimen sliding scale   SubCutaneous Before meals and at bedtime  insulin lispro Injectable (HumaLOG) 5 Unit(s) SubCutaneous three times a day before meals  iron sucrose IVPB 100 milliGRAM(s) IV Intermittent every 24 hours  predniSONE   Tablet 5 milliGRAM(s) Oral daily  sodium chloride 0.9%. 1000 milliLiter(s) (100 mL/Hr) IV Continuous <Continuous>  tacrolimus 3 milliGRAM(s) Oral every 12 hours    MEDICATIONS  (PRN):  acetaminophen   Tablet .. 650 milliGRAM(s) Oral every 6 hours PRN Mild Pain (1 - 3)  benzocaine 15 mG/menthol 3.6 mG (Sugar-Free) Lozenge 1 Lozenge Oral three times a day PRN Sore Throat  guaiFENesin   Syrup  (Sugar-Free) 100 milliGRAM(s) Oral every 6 hours PRN Cough  oxycodone    5 mG/acetaminophen 325 mG 1 Tablet(s) Oral every 6 hours PRN Moderate Pain (4 - 6)      FAMILY HISTORY:  No pertinent family history in first degree relatives      SOCIAL HISTORY:      REVIEW OF SYSTEMS:  CONSTITUTIONAL: No fever, weight loss, or fatigue  EYES: No eye pain, visual disturbances, or discharge  ENT:  No difficulty hearing, tinnitus, vertigo; No sinus or throat pain  NECK: No pain or stiffness  RESPIRATORY: No cough, wheezing, chills or hemoptysis; No Shortness of Breath  CARDIOVASCULAR: No chest pain, palpitations, passing out, dizziness, or leg swelling  GASTROINTESTINAL: No abdominal or epigastric pain. No nausea, vomiting, or hematemesis; No diarrhea or constipation. No melena or hematochezia.  GENITOURINARY: No dysuria, frequency, hematuria, or incontinence  NEUROLOGICAL: No headaches, memory loss, loss of strength, numbness, or tremors  SKIN: No itching, burning, rashes, or lesions   LYMPH Nodes: No enlarged glands  ENDOCRINE: No heat or cold intolerance; No hair loss  MUSCULOSKELETAL: No joint pain or swelling; No muscle, back, or extremity pain  PSYCHIATRIC: No depression, anxiety, mood swings, or difficulty sleeping  HEME/LYMPH: No easy bruising, or bleeding gums  ALLERGY AND IMMUNOLOGIC: No hives or eczema	        Vital Signs Last 24 Hrs  T(C): 36.9 (30 Dec 2019 07:40), Max: 37.3 (30 Dec 2019 00:04)  T(F): 98.4 (30 Dec 2019 07:40), Max: 99.1 (30 Dec 2019 00:04)  HR: 89 (30 Dec 2019 07:40) (80 - 92)  BP: 122/69 (30 Dec 2019 07:40) (99/70 - 126/73)  BP(mean): --  RR: 16 (30 Dec 2019 07:40) (16 - 18)  SpO2: 100% (30 Dec 2019 07:40) (97% - 100%)      Constitutional:    HEENT: nad    Neck:  No JVD, bruits or thyromegaly    Respiratory:  Clear without rales or rhonchi    Cardiovascular:  RR without murmur, rub or gallop.    Gastrointestinal: Soft without hepatosplenomegaly.    Extremities: without cyanosis, clubbing or edema.    Neurological:  Oriented   x  3    . No gross sensory or motor defects.        LABS:                        7.7    6.89  )-----------( 245      ( 30 Dec 2019 08:50 )             26.8     12-30    139  |  110<H>  |  15  ----------------------------<  271<H>  4.1   |  22  |  1.96<H>    Ca    8.3<L>      30 Dec 2019 08:50  Phos  2.1     12-30  Mg     1.5     12-30    TPro  6.8  /  Alb  2.5<L>  /  TBili  0.3  /  DBili  x   /  AST  11  /  ALT  18  /  AlkPhos  138<H>  12-30        Hemoglobin A1C, Whole Blood (12.28.19 @ 09:46)    Hemoglobin A1C, Whole Blood: 8.5: Method: Immunoassay             CAPILLARY BLOOD GLUCOSE      POCT Blood Glucose.: 252 mg/dL (30 Dec 2019 07:50)  POCT Blood Glucose.: 221 mg/dL (29 Dec 2019 22:29)  POCT Blood Glucose.: 261 mg/dL (29 Dec 2019 21:11)  POCT Blood Glucose.: 218 mg/dL (29 Dec 2019 16:49)  POCT Blood Glucose.: 315 mg/dL (29 Dec 2019 11:43)      RADIOLOGY & ADDITIONAL STUDIES:

## 2020-07-27 ENCOUNTER — INPATIENT (INPATIENT)
Facility: HOSPITAL | Age: 40
LOS: 1 days | Discharge: HOME | DRG: 312 | End: 2020-07-29
Attending: STUDENT IN AN ORGANIZED HEALTH CARE EDUCATION/TRAINING PROGRAM | Admitting: STUDENT IN AN ORGANIZED HEALTH CARE EDUCATION/TRAINING PROGRAM
Payer: COMMERCIAL

## 2020-07-27 VITALS
OXYGEN SATURATION: 98 % | RESPIRATION RATE: 20 BRPM | DIASTOLIC BLOOD PRESSURE: 91 MMHG | HEIGHT: 67 IN | HEART RATE: 73 BPM | TEMPERATURE: 98 F | SYSTOLIC BLOOD PRESSURE: 129 MMHG

## 2020-07-27 DIAGNOSIS — I25.10 ATHEROSCLEROTIC HEART DISEASE OF NATIVE CORONARY ARTERY WITHOUT ANGINA PECTORIS: ICD-10-CM

## 2020-07-27 DIAGNOSIS — R55 SYNCOPE AND COLLAPSE: ICD-10-CM

## 2020-07-27 DIAGNOSIS — E11.9 TYPE 2 DIABETES MELLITUS WITHOUT COMPLICATIONS: ICD-10-CM

## 2020-07-27 DIAGNOSIS — N18.9 CHRONIC KIDNEY DISEASE, UNSPECIFIED: ICD-10-CM

## 2020-07-27 DIAGNOSIS — Z29.9 ENCOUNTER FOR PROPHYLACTIC MEASURES, UNSPECIFIED: ICD-10-CM

## 2020-07-27 DIAGNOSIS — Z94.0 KIDNEY TRANSPLANT STATUS: Chronic | ICD-10-CM

## 2020-07-27 LAB
ALBUMIN SERPL ELPH-MCNC: 2.8 G/DL — LOW (ref 3.5–5)
ALP SERPL-CCNC: 110 U/L — SIGNIFICANT CHANGE UP (ref 40–120)
ALT FLD-CCNC: 25 U/L DA — SIGNIFICANT CHANGE UP (ref 10–60)
AMPHET UR-MCNC: NEGATIVE — SIGNIFICANT CHANGE UP
ANION GAP SERPL CALC-SCNC: 7 MMOL/L — SIGNIFICANT CHANGE UP (ref 5–17)
ANION GAP SERPL CALC-SCNC: 8 MMOL/L — SIGNIFICANT CHANGE UP (ref 5–17)
APPEARANCE UR: ABNORMAL
APTT BLD: 26 SEC — LOW (ref 27.5–35.5)
AST SERPL-CCNC: 29 U/L — SIGNIFICANT CHANGE UP (ref 10–40)
BACTERIA # UR AUTO: ABNORMAL /HPF
BARBITURATES UR SCN-MCNC: NEGATIVE — SIGNIFICANT CHANGE UP
BASOPHILS # BLD AUTO: 0.05 K/UL — SIGNIFICANT CHANGE UP (ref 0–0.2)
BASOPHILS NFR BLD AUTO: 0.5 % — SIGNIFICANT CHANGE UP (ref 0–2)
BENZODIAZ UR-MCNC: NEGATIVE — SIGNIFICANT CHANGE UP
BILIRUB SERPL-MCNC: 0.4 MG/DL — SIGNIFICANT CHANGE UP (ref 0.2–1.2)
BILIRUB UR-MCNC: NEGATIVE — SIGNIFICANT CHANGE UP
BLD GP AB SCN SERPL QL: SIGNIFICANT CHANGE UP
BUN SERPL-MCNC: 21 MG/DL — HIGH (ref 7–18)
BUN SERPL-MCNC: 24 MG/DL — HIGH (ref 7–18)
CALCIUM SERPL-MCNC: 8.5 MG/DL — SIGNIFICANT CHANGE UP (ref 8.4–10.5)
CALCIUM SERPL-MCNC: 8.7 MG/DL — SIGNIFICANT CHANGE UP (ref 8.4–10.5)
CHLORIDE SERPL-SCNC: 108 MMOL/L — SIGNIFICANT CHANGE UP (ref 96–108)
CHLORIDE SERPL-SCNC: 109 MMOL/L — HIGH (ref 96–108)
CO2 SERPL-SCNC: 21 MMOL/L — LOW (ref 22–31)
CO2 SERPL-SCNC: 24 MMOL/L — SIGNIFICANT CHANGE UP (ref 22–31)
COCAINE METAB.OTHER UR-MCNC: NEGATIVE — SIGNIFICANT CHANGE UP
COLOR SPEC: ABNORMAL
CREAT SERPL-MCNC: 1.84 MG/DL — HIGH (ref 0.5–1.3)
CREAT SERPL-MCNC: 2.15 MG/DL — HIGH (ref 0.5–1.3)
DIFF PNL FLD: ABNORMAL
EOSINOPHIL # BLD AUTO: 0.23 K/UL — SIGNIFICANT CHANGE UP (ref 0–0.5)
EOSINOPHIL NFR BLD AUTO: 2.2 % — SIGNIFICANT CHANGE UP (ref 0–6)
EPI CELLS # UR: SIGNIFICANT CHANGE UP /HPF
FOLATE SERPL-MCNC: 6.8 NG/ML — SIGNIFICANT CHANGE UP
GLUCOSE BLDC GLUCOMTR-MCNC: 134 MG/DL — HIGH (ref 70–99)
GLUCOSE BLDC GLUCOMTR-MCNC: 137 MG/DL — HIGH (ref 70–99)
GLUCOSE BLDC GLUCOMTR-MCNC: 168 MG/DL — HIGH (ref 70–99)
GLUCOSE BLDC GLUCOMTR-MCNC: 241 MG/DL — HIGH (ref 70–99)
GLUCOSE SERPL-MCNC: 176 MG/DL — HIGH (ref 70–99)
GLUCOSE SERPL-MCNC: 245 MG/DL — HIGH (ref 70–99)
GLUCOSE UR QL: 1000 MG/DL
HCG SERPL-ACNC: <1 MIU/ML — SIGNIFICANT CHANGE UP
HCG UR QL: NEGATIVE — SIGNIFICANT CHANGE UP
HCT VFR BLD CALC: 40 % — SIGNIFICANT CHANGE UP (ref 34.5–45)
HCT VFR BLD CALC: 40.8 % — SIGNIFICANT CHANGE UP (ref 34.5–45)
HCT VFR BLD CALC: 40.9 % — SIGNIFICANT CHANGE UP (ref 34.5–45)
HGB BLD-MCNC: 13 G/DL — SIGNIFICANT CHANGE UP (ref 11.5–15.5)
HGB BLD-MCNC: 13 G/DL — SIGNIFICANT CHANGE UP (ref 11.5–15.5)
HGB BLD-MCNC: 13.2 G/DL — SIGNIFICANT CHANGE UP (ref 11.5–15.5)
HIV 1 & 2 AB SERPL IA.RAPID: SIGNIFICANT CHANGE UP
IMM GRANULOCYTES NFR BLD AUTO: 0.4 % — SIGNIFICANT CHANGE UP (ref 0–1.5)
INR BLD: 0.95 RATIO — SIGNIFICANT CHANGE UP (ref 0.88–1.16)
KETONES UR-MCNC: ABNORMAL
LEUKOCYTE ESTERASE UR-ACNC: ABNORMAL
LYMPHOCYTES # BLD AUTO: 2.75 K/UL — SIGNIFICANT CHANGE UP (ref 1–3.3)
LYMPHOCYTES # BLD AUTO: 26 % — SIGNIFICANT CHANGE UP (ref 13–44)
MAGNESIUM SERPL-MCNC: 2.1 MG/DL — SIGNIFICANT CHANGE UP (ref 1.6–2.6)
MCHC RBC-ENTMCNC: 28 PG — SIGNIFICANT CHANGE UP (ref 27–34)
MCHC RBC-ENTMCNC: 28.4 PG — SIGNIFICANT CHANGE UP (ref 27–34)
MCHC RBC-ENTMCNC: 28.4 PG — SIGNIFICANT CHANGE UP (ref 27–34)
MCHC RBC-ENTMCNC: 31.8 GM/DL — LOW (ref 32–36)
MCHC RBC-ENTMCNC: 32.4 GM/DL — SIGNIFICANT CHANGE UP (ref 32–36)
MCHC RBC-ENTMCNC: 32.5 GM/DL — SIGNIFICANT CHANGE UP (ref 32–36)
MCV RBC AUTO: 87.3 FL — SIGNIFICANT CHANGE UP (ref 80–100)
MCV RBC AUTO: 87.7 FL — SIGNIFICANT CHANGE UP (ref 80–100)
MCV RBC AUTO: 88 FL — SIGNIFICANT CHANGE UP (ref 80–100)
METHADONE UR-MCNC: NEGATIVE — SIGNIFICANT CHANGE UP
MONOCYTES # BLD AUTO: 0.55 K/UL — SIGNIFICANT CHANGE UP (ref 0–0.9)
MONOCYTES NFR BLD AUTO: 5.2 % — SIGNIFICANT CHANGE UP (ref 2–14)
NEUTROPHILS # BLD AUTO: 6.94 K/UL — SIGNIFICANT CHANGE UP (ref 1.8–7.4)
NEUTROPHILS NFR BLD AUTO: 65.7 % — SIGNIFICANT CHANGE UP (ref 43–77)
NITRITE UR-MCNC: NEGATIVE — SIGNIFICANT CHANGE UP
NRBC # BLD: 0 /100 WBCS — SIGNIFICANT CHANGE UP (ref 0–0)
OPIATES UR-MCNC: NEGATIVE — SIGNIFICANT CHANGE UP
PCP SPEC-MCNC: SIGNIFICANT CHANGE UP
PCP UR-MCNC: NEGATIVE — SIGNIFICANT CHANGE UP
PH UR: 5 — SIGNIFICANT CHANGE UP (ref 5–8)
PHOSPHATE SERPL-MCNC: 2.9 MG/DL — SIGNIFICANT CHANGE UP (ref 2.5–4.5)
PLATELET # BLD AUTO: 194 K/UL — SIGNIFICANT CHANGE UP (ref 150–400)
PLATELET # BLD AUTO: 198 K/UL — SIGNIFICANT CHANGE UP (ref 150–400)
PLATELET # BLD AUTO: 198 K/UL — SIGNIFICANT CHANGE UP (ref 150–400)
POTASSIUM SERPL-MCNC: 3.8 MMOL/L — SIGNIFICANT CHANGE UP (ref 3.5–5.3)
POTASSIUM SERPL-MCNC: 5.3 MMOL/L — SIGNIFICANT CHANGE UP (ref 3.5–5.3)
POTASSIUM SERPL-SCNC: 3.8 MMOL/L — SIGNIFICANT CHANGE UP (ref 3.5–5.3)
POTASSIUM SERPL-SCNC: 5.3 MMOL/L — SIGNIFICANT CHANGE UP (ref 3.5–5.3)
PROT SERPL-MCNC: 7.2 G/DL — SIGNIFICANT CHANGE UP (ref 6–8.3)
PROT UR-MCNC: 100
PROTHROM AB SERPL-ACNC: 11.1 SEC — SIGNIFICANT CHANGE UP (ref 10.6–13.6)
RBC # BLD: 4.58 M/UL — SIGNIFICANT CHANGE UP (ref 3.8–5.2)
RBC # BLD: 4.65 M/UL — SIGNIFICANT CHANGE UP (ref 3.8–5.2)
RBC # BLD: 4.65 M/UL — SIGNIFICANT CHANGE UP (ref 3.8–5.2)
RBC # FLD: 12.5 % — SIGNIFICANT CHANGE UP (ref 10.3–14.5)
RBC # FLD: 12.5 % — SIGNIFICANT CHANGE UP (ref 10.3–14.5)
RBC # FLD: 12.6 % — SIGNIFICANT CHANGE UP (ref 10.3–14.5)
RBC CASTS # UR COMP ASSIST: >50 /HPF (ref 0–2)
SARS-COV-2 IGG SERPL QL IA: NEGATIVE — SIGNIFICANT CHANGE UP
SARS-COV-2 IGM SERPL IA-ACNC: <3.8 AU/ML — SIGNIFICANT CHANGE UP
SARS-COV-2 RNA SPEC QL NAA+PROBE: SIGNIFICANT CHANGE UP
SODIUM SERPL-SCNC: 137 MMOL/L — SIGNIFICANT CHANGE UP (ref 135–145)
SODIUM SERPL-SCNC: 140 MMOL/L — SIGNIFICANT CHANGE UP (ref 135–145)
SP GR SPEC: 1.02 — SIGNIFICANT CHANGE UP (ref 1.01–1.02)
THC UR QL: NEGATIVE — SIGNIFICANT CHANGE UP
TROPONIN I SERPL-MCNC: <0.015 NG/ML — SIGNIFICANT CHANGE UP (ref 0–0.04)
TROPONIN I SERPL-MCNC: <0.015 NG/ML — SIGNIFICANT CHANGE UP (ref 0–0.04)
TSH SERPL-MCNC: 1.35 UU/ML — SIGNIFICANT CHANGE UP (ref 0.34–4.82)
UROBILINOGEN FLD QL: NEGATIVE — SIGNIFICANT CHANGE UP
VIT B12 SERPL-MCNC: 382 PG/ML — SIGNIFICANT CHANGE UP (ref 232–1245)
VIT D25+D1,25 OH+D1,25 PNL SERPL-MCNC: 41.7 PG/ML — SIGNIFICANT CHANGE UP (ref 19.9–79.3)
WBC # BLD: 10.56 K/UL — HIGH (ref 3.8–10.5)
WBC # BLD: 12.31 K/UL — HIGH (ref 3.8–10.5)
WBC # BLD: 9.53 K/UL — SIGNIFICANT CHANGE UP (ref 3.8–10.5)
WBC # FLD AUTO: 10.56 K/UL — HIGH (ref 3.8–10.5)
WBC # FLD AUTO: 12.31 K/UL — HIGH (ref 3.8–10.5)
WBC # FLD AUTO: 9.53 K/UL — SIGNIFICANT CHANGE UP (ref 3.8–10.5)
WBC UR QL: SIGNIFICANT CHANGE UP /HPF (ref 0–5)

## 2020-07-27 PROCEDURE — 99223 1ST HOSP IP/OBS HIGH 75: CPT

## 2020-07-27 PROCEDURE — 93880 EXTRACRANIAL BILAT STUDY: CPT | Mod: 26

## 2020-07-27 PROCEDURE — 71046 X-RAY EXAM CHEST 2 VIEWS: CPT | Mod: 26

## 2020-07-27 PROCEDURE — 99285 EMERGENCY DEPT VISIT HI MDM: CPT | Mod: 25

## 2020-07-27 PROCEDURE — 70450 CT HEAD/BRAIN W/O DYE: CPT | Mod: 26

## 2020-07-27 RX ORDER — METOPROLOL TARTRATE 50 MG
1 TABLET ORAL
Qty: 0 | Refills: 0 | DISCHARGE

## 2020-07-27 RX ORDER — CLOPIDOGREL BISULFATE 75 MG/1
75 TABLET, FILM COATED ORAL DAILY
Refills: 0 | Status: DISCONTINUED | OUTPATIENT
Start: 2020-07-27 | End: 2020-07-29

## 2020-07-27 RX ORDER — INSULIN LISPRO 100/ML
15 VIAL (ML) SUBCUTANEOUS
Refills: 0 | Status: DISCONTINUED | OUTPATIENT
Start: 2020-07-27 | End: 2020-07-29

## 2020-07-27 RX ORDER — ACETAMINOPHEN 500 MG
650 TABLET ORAL ONCE
Refills: 0 | Status: COMPLETED | OUTPATIENT
Start: 2020-07-27 | End: 2020-07-27

## 2020-07-27 RX ORDER — MECLIZINE HCL 12.5 MG
25 TABLET ORAL ONCE
Refills: 0 | Status: COMPLETED | OUTPATIENT
Start: 2020-07-27 | End: 2020-07-27

## 2020-07-27 RX ORDER — HEPARIN SODIUM 5000 [USP'U]/ML
5000 INJECTION INTRAVENOUS; SUBCUTANEOUS EVERY 12 HOURS
Refills: 0 | Status: DISCONTINUED | OUTPATIENT
Start: 2020-07-27 | End: 2020-07-29

## 2020-07-27 RX ORDER — ATORVASTATIN CALCIUM 80 MG/1
1 TABLET, FILM COATED ORAL
Qty: 0 | Refills: 0 | DISCHARGE

## 2020-07-27 RX ORDER — ASPIRIN/CALCIUM CARB/MAGNESIUM 324 MG
81 TABLET ORAL DAILY
Refills: 0 | Status: DISCONTINUED | OUTPATIENT
Start: 2020-07-27 | End: 2020-07-29

## 2020-07-27 RX ORDER — INSULIN GLARGINE 100 [IU]/ML
32 INJECTION, SOLUTION SUBCUTANEOUS AT BEDTIME
Refills: 0 | Status: DISCONTINUED | OUTPATIENT
Start: 2020-07-27 | End: 2020-07-29

## 2020-07-27 RX ORDER — METOPROLOL TARTRATE 50 MG
25 TABLET ORAL DAILY
Refills: 0 | Status: DISCONTINUED | OUTPATIENT
Start: 2020-07-27 | End: 2020-07-29

## 2020-07-27 RX ORDER — TACROLIMUS 5 MG/1
3 CAPSULE ORAL EVERY 12 HOURS
Refills: 0 | Status: DISCONTINUED | OUTPATIENT
Start: 2020-07-27 | End: 2020-07-29

## 2020-07-27 RX ORDER — ATORVASTATIN CALCIUM 80 MG/1
80 TABLET, FILM COATED ORAL AT BEDTIME
Refills: 0 | Status: DISCONTINUED | OUTPATIENT
Start: 2020-07-27 | End: 2020-07-29

## 2020-07-27 RX ORDER — INSULIN LISPRO 100/ML
VIAL (ML) SUBCUTANEOUS
Refills: 0 | Status: DISCONTINUED | OUTPATIENT
Start: 2020-07-27 | End: 2020-07-29

## 2020-07-27 RX ORDER — SODIUM CHLORIDE 9 MG/ML
1000 INJECTION INTRAMUSCULAR; INTRAVENOUS; SUBCUTANEOUS ONCE
Refills: 0 | Status: COMPLETED | OUTPATIENT
Start: 2020-07-27 | End: 2020-07-27

## 2020-07-27 RX ORDER — CLOPIDOGREL BISULFATE 75 MG/1
1 TABLET, FILM COATED ORAL
Qty: 0 | Refills: 0 | DISCHARGE

## 2020-07-27 RX ORDER — FERROUS SULFATE 325(65) MG
325 TABLET ORAL DAILY
Refills: 0 | Status: DISCONTINUED | OUTPATIENT
Start: 2020-07-27 | End: 2020-07-29

## 2020-07-27 RX ORDER — ASPIRIN/CALCIUM CARB/MAGNESIUM 324 MG
1 TABLET ORAL
Qty: 0 | Refills: 0 | DISCHARGE

## 2020-07-27 RX ADMIN — Medication 2: at 17:57

## 2020-07-27 RX ADMIN — ATORVASTATIN CALCIUM 80 MILLIGRAM(S): 80 TABLET, FILM COATED ORAL at 22:25

## 2020-07-27 RX ADMIN — Medication 81 MILLIGRAM(S): at 11:58

## 2020-07-27 RX ADMIN — Medication 5 MILLIGRAM(S): at 12:01

## 2020-07-27 RX ADMIN — INSULIN GLARGINE 32 UNIT(S): 100 INJECTION, SOLUTION SUBCUTANEOUS at 22:24

## 2020-07-27 RX ADMIN — Medication 25 MILLIGRAM(S): at 02:27

## 2020-07-27 RX ADMIN — CLOPIDOGREL BISULFATE 75 MILLIGRAM(S): 75 TABLET, FILM COATED ORAL at 11:58

## 2020-07-27 RX ADMIN — HEPARIN SODIUM 5000 UNIT(S): 5000 INJECTION INTRAVENOUS; SUBCUTANEOUS at 17:58

## 2020-07-27 RX ADMIN — Medication 15 UNIT(S): at 17:58

## 2020-07-27 RX ADMIN — TACROLIMUS 3 MILLIGRAM(S): 5 CAPSULE ORAL at 17:58

## 2020-07-27 RX ADMIN — Medication 325 MILLIGRAM(S): at 11:59

## 2020-07-27 RX ADMIN — SODIUM CHLORIDE 1000 MILLILITER(S): 9 INJECTION INTRAMUSCULAR; INTRAVENOUS; SUBCUTANEOUS at 02:27

## 2020-07-27 RX ADMIN — Medication 25 MILLIGRAM(S): at 12:01

## 2020-07-27 NOTE — H&P ADULT - PROBLEM SELECTOR PLAN 1
Pt admitted after having a spell of dizziness while using the restroom with room spinning  Pt fell down on the R lateral side but denies any LOC or head trauma   EKG- NSR, tropsx 1 negative   f/u ct head  F/u Orthostatics, carotid doppler and Echo  Neuro consult- Dr. Constantino

## 2020-07-27 NOTE — H&P ADULT - PROBLEM SELECTOR PLAN 5
IMPROVE VTE Individual Risk Assessment    RISK                                                          Points  [] Previous VTE                                           3  [] Thrombophilia                                        2  [] Lower limb paralysis                              2   [] Current Cancer                                       2   [] Immobilization > 24 hrs                        1  [] ICU/CCU stay > 24 hours                       1  [] Age > 60                                                   1    IMPROVE VTE Score: 1  Heparin 5000 sc q12

## 2020-07-27 NOTE — ED PROVIDER NOTE - CLINICAL SUMMARY MEDICAL DECISION MAKING FREE TEXT BOX
40yo F w DM, CAD and kidney transplant, in the ER for fatigue, dizziness and brief syncopal episode today in the context of heavy menstrual bleeding for 4 days. VS, exam wnl, pink conjunctivae. Low suspicion for symptomatic anemia, poss cardiac syncope. Will get cardiac labs, EKG, CXR, likely admit

## 2020-07-27 NOTE — ED ADULT NURSE REASSESSMENT NOTE - NS ED NURSE REASSESS COMMENT FT1
pt alert awake oriented x3 no acute distress noted continues on telemetry monitoring, pt in SONO at this time pending bed assignment

## 2020-07-27 NOTE — H&P ADULT - CARDIOVASCULAR
Partially impaired: cannot see medication labels or newsprint, but can see obstacles in path, and the surrounding layout; can count fingers at arm's length Regular rate & rhythm, normal S1, S2; no murmurs, gallops or rubs; no S3, S4

## 2020-07-27 NOTE — H&P ADULT - ATTENDING COMMENTS
39 year old woman with PMH of CAD s/p PCI with 1 stent on plavix, ESRD s/p kidney transplant ('99) presenting with 1 day of sudden onst spinning sensation both while standing and lying position that did not improve after taking a nap. She had a  brief period of LOC ( preceded by nausea," internal abdominal heat" lightheadedness and blurry vision when she got up to use the bathroom after.   She stated she spent the day out in the sun with inadequate hydration. By the time she got home, she started to have the symptoms.  Remote hx of vertigo like symptoms ; self resolved    Vital Signs Last 24 Hrs  T(C): 36.6 (27 Jul 2020 06:15), Max: 36.6 (27 Jul 2020 01:35)  T(F): 97.8 (27 Jul 2020 06:15), Max: 97.9 (27 Jul 2020 01:35)  HR: 74 (27 Jul 2020 06:15) (73 - 74)  BP: 130/86 (27 Jul 2020 06:15) (129/91 - 130/86)  RR: 18 (27 Jul 2020 06:15) (18 - 20)  SpO2: 96% (27 Jul 2020 06:15) (96% - 98%)    Youg woman, obese, NAD, feels groggy sitting up in bed, AAO X 3  CTA B/L RRR S1S2 only  Soft NT ND BS +  No pedal edema  No focal deficits    labs                        13.2   12.31 )-----------( 198      ( 27 Jul 2020 04:45 )             40.8     07-27    137  |  108  |  24<H>  ----------------------------<  245<H>  5.3   |  21<L>  |  2.15<H>    Ca    8.5      27 Jul 2020 02:29    TPro  7.2  /  Alb  2.8<L>  /  TBili  0.4  /  DBili  x   /  AST  29  /  ALT  25  /  AlkPhos  110  07-27    Impression  - Vertigo   - Vasovagal syncope   - CAD s/p PCI   - ESRD s/p renal transplant  - Hx of chronic iron deficiency anemia - resolved with iron supplementation  - Hx of dysfunctional uterine bleeding - resolving 39 year old woman with PMH of CAD s/p PCI with 1 stent on plavix, ESRD s/p kidney transplant ('99) presenting with 1 day of sudden onset spinning sensation both while standing and lying position that did not improve after taking a nap. She had a  brief period of LOC ( preceded by nausea," internal abdominal heat" lightheadedness and blurry vision when she got up to use the bathroom after.   She stated she spent the day out in the sun with inadequate hydration. By the time she got home, she started to have the symptoms.  Remote hx of vertigo like symptoms ; self resolved    Vital Signs Last 24 Hrs  T(C): 36.6 (27 Jul 2020 06:15), Max: 36.6 (27 Jul 2020 01:35)  T(F): 97.8 (27 Jul 2020 06:15), Max: 97.9 (27 Jul 2020 01:35)  HR: 74 (27 Jul 2020 06:15) (73 - 74)  BP: 130/86 (27 Jul 2020 06:15) (129/91 - 130/86)  RR: 18 (27 Jul 2020 06:15) (18 - 20)  SpO2: 96% (27 Jul 2020 06:15) (96% - 98%)    Youg woman, obese, NAD, feels groggy sitting up in bed, AAO X 3  CTA B/L RRR S1S2 only  Soft NT ND BS +  No pedal edema  No focal deficits    labs                        13.2   12.31 )-----------( 198      ( 27 Jul 2020 04:45 )             40.8     07-27    137  |  108  |  24<H>  ----------------------------<  245<H>  5.3   |  21<L>  |  2.15<H>    Ca    8.5      27 Jul 2020 02:29    TPro  7.2  /  Alb  2.8<L>  /  TBili  0.4  /  DBili  x   /  AST  29  /  ALT  25  /  AlkPhos  110  07-27    Impression  Young woman with hx as above presenting with symptoms suggestive of both acute onset vertigo and heat related vasaovagal syncope.  Symptoms less likely to be cardio/neuro vascular in origin.    A/P  - Vertigo   - Vasovagal syncope   - CAD s/p PCI   - ESRD s/p renal transplant  - Hx of chronic iron deficiency anemia - resolved with iron supplementation  - Hx of dysfunctional uterine bleeding - resolving  - DM 2    Plan  - Admit to telemetry  - Serial trop / EKG  - ECHO - 2D  - Bedrest /fall risk  - Neurology consult  - carotid dopplers B/L  - Head CT- appears unremarkable  - Neurology consult  - Continue home meds

## 2020-07-27 NOTE — H&P ADULT - NSHPLABSRESULTS_GEN_ALL_CORE
13.2   12.31 )-----------( 198      ( 2020 04:45 )             40.8       07    137  |  108  |  24<H>  ----------------------------<  245<H>  5.3   |  21<L>  |  2.15<H>    Ca    8.5      2020 02:29    TPro  7.2  /  Alb  2.8<L>  /  TBili  0.4  /  DBili  x   /  AST  29  /  ALT  25  /  AlkPhos  110  0727              Urinalysis Basic - ( 2020 04:45 )    Color: Red / Appearance: Slightly Turbid / S.020 / pH: x  Gluc: x / Ketone: Trace  / Bili: Negative / Urobili: Negative   Blood: x / Protein: 100 / Nitrite: Negative   Leuk Esterase: Small / RBC: >50 /HPF / WBC 3-5 /HPF   Sq Epi: x / Non Sq Epi: Few /HPF / Bacteria: Few /HPF        PT/INR - ( 2020 02:29 )   PT: 11.1 sec;   INR: 0.95 ratio         PTT - ( 2020 02:29 )  PTT:26.0 sec    Lactate Trend      CARDIAC MARKERS ( 2020 02:29 )  <0.015 ng/mL / x     / x     / x     / x            CAPILLARY BLOOD GLUCOSE

## 2020-07-27 NOTE — PATIENT PROFILE ADULT - STATED REASON FOR ADMISSION
Heavy bleeding with period. Dizziness and weakness. Heavy bleeding with period. Dizziness and weakness. Fell last night

## 2020-07-27 NOTE — H&P ADULT - NSHPPHYSICALEXAM_GEN_ALL_CORE
Vital Signs (24 Hrs):  T(C): 36.6 (07-27-20 @ 01:35), Max: 36.6 (07-27-20 @ 01:35)  HR: 73 (07-27-20 @ 01:35) (73 - 73)  BP: 129/91 (07-27-20 @ 01:35) (129/91 - 129/91)  RR: 20 (07-27-20 @ 01:35) (20 - 20)  SpO2: 98% (07-27-20 @ 01:35) (98% - 98%)  Wt(kg): --  Daily Height in cm: 170.18 (27 Jul 2020 01:35)    Daily     I&O's Summary

## 2020-07-27 NOTE — ED ADULT NURSE NOTE - NSIMPLEMENTINTERV_GEN_ALL_ED
Implemented All Fall Risk Interventions:  [place] to call system. Call bell, personal items and telephone within reach. Instruct patient to call for assistance. Room bathroom lighting operational. Non-slip footwear when patient is off stretcher. Physically safe environment: no spills, clutter or unnecessary equipment. Stretcher in lowest position, wheels locked, appropriate side rails in place. Provide visual cue, wrist band, yellow gown, etc. Monitor gait and stability. Monitor for mental status changes and reorient to person, place, and time. Review medications for side effects contributing to fall risk. Reinforce activity limits and safety measures with patient and family.

## 2020-07-27 NOTE — H&P ADULT - HISTORY OF PRESENT ILLNESS
39 F from home, self ambulating ww DM, CAD/stent, s/p kidney transplant in 1999, in the ER for dizziness and syncopal episode today. States she has had heavy periods since she got started on Plavix, s/p stent placement in Dec 2019. menstruation started 4 days ago, passing large clots, 5-6 pads per day. Has been feeling very fatigued and dizzy. Today she felt lightheaded and collapsed after taking a shower, assoc w nausea and 1x vomiting. Brief LOC. Denies CP/SOB/palpitations/abdom or back pains. C/o only fatigue in the ER upon exam.      In the ED,   Pt is AAOX 3, no signs of any distress  Vitals- 129/91, hR 73  EKG- NSR  Trops 0.015,   BHCG- 3   WBC 12K  Cr 2.15 39 F from home, self ambulating, works at [employer], with PMH of DM, CAD/stent( 2018), s/p kidney transplant (1999), in the ER for dizziness and syncopal episode today. Pt was in her usual state of health until last night when she noted sudden onset of dizziness while in the restroom. As per patient she was trying to reach for the toilet paper and noted sudden of dizziness that was associated with blackening out. She got up and noticed " room spinning " like sensation. She was unable to hold herself and felt on the R lateral side. She was down for almost 2-3 minutes and stood back again and sat on the toilet seat. She also endorsed mild palpitations and head aches during the episode,  Pt denies any Head trauma, LOC, chest pain, SOB, ringing sensation in ears.  Pt mentions having similar episode years ago.   States she has had heavy periods since she got started on Plavix, s/p stent placement in Dec 2019. menstruation started 4 days ago, passing large clots, 5-6 pads per day.       In the ED,   Pt is AAOX 3, no signs of any distress  Vitals- 129/91, hR 73  EKG- NSR  Trops 0.015,   BHCG- 3   WBC 12K  Cr 2.15

## 2020-07-27 NOTE — H&P ADULT - PROBLEM SELECTOR PLAN 3
Pt has Known CKD and had undergone s/p Renal transplant 19 yrs ago   Pt on Prograf and prednisone at home    c/w home meds

## 2020-07-27 NOTE — ED ADULT TRIAGE NOTE - CHIEF COMPLAINT QUOTE
pt stated is on her menstruation with very heavy flow with blood clots that started Thursday night, she felt weak, dizziness and passed out occurred 1 hour ago. Pt unable to stand on scale.

## 2020-07-27 NOTE — ED PROVIDER NOTE - OBJECTIVE STATEMENT
40yo F w DM, CAD/stent, s/p kidney transplant in 1999, in the ER for dizziness and syncopal episode today. States she has had heavy periods since she got started on Plavix, s/p stent placement in Dec 2019. menstruation started 4 days ago, passing large clots, 5-6 pads per day. Has been feeling very fatigued and dizzy. Today she felt lightheaded and collapsed after taking a shower, assoc w nausea and 1x vomiting. Brief LOC. Denies CP/SOB/palpitations/abdom or back pains. C/o only fatigue in the ER upon exam.

## 2020-07-28 LAB
A1C WITH ESTIMATED AVERAGE GLUCOSE RESULT: 11.4 % — HIGH (ref 4–5.6)
A1C WITH ESTIMATED AVERAGE GLUCOSE RESULT: 11.6 % — HIGH (ref 4–5.6)
ALBUMIN SERPL ELPH-MCNC: 2.6 G/DL — LOW (ref 3.5–5)
ALP SERPL-CCNC: 91 U/L — SIGNIFICANT CHANGE UP (ref 40–120)
ALT FLD-CCNC: 21 U/L DA — SIGNIFICANT CHANGE UP (ref 10–60)
ANION GAP SERPL CALC-SCNC: 9 MMOL/L — SIGNIFICANT CHANGE UP (ref 5–17)
AST SERPL-CCNC: 13 U/L — SIGNIFICANT CHANGE UP (ref 10–40)
BASOPHILS # BLD AUTO: 0.05 K/UL — SIGNIFICANT CHANGE UP (ref 0–0.2)
BASOPHILS NFR BLD AUTO: 0.5 % — SIGNIFICANT CHANGE UP (ref 0–2)
BILIRUB SERPL-MCNC: 0.3 MG/DL — SIGNIFICANT CHANGE UP (ref 0.2–1.2)
BUN SERPL-MCNC: 22 MG/DL — HIGH (ref 7–18)
CALCIUM SERPL-MCNC: 8.9 MG/DL — SIGNIFICANT CHANGE UP (ref 8.4–10.5)
CHLORIDE SERPL-SCNC: 110 MMOL/L — HIGH (ref 96–108)
CO2 SERPL-SCNC: 22 MMOL/L — SIGNIFICANT CHANGE UP (ref 22–31)
CREAT SERPL-MCNC: 1.72 MG/DL — HIGH (ref 0.5–1.3)
EOSINOPHIL # BLD AUTO: 0.21 K/UL — SIGNIFICANT CHANGE UP (ref 0–0.5)
EOSINOPHIL NFR BLD AUTO: 2.3 % — SIGNIFICANT CHANGE UP (ref 0–6)
ESTIMATED AVERAGE GLUCOSE: 280 MG/DL — HIGH (ref 68–114)
ESTIMATED AVERAGE GLUCOSE: 286 MG/DL — HIGH (ref 68–114)
GLUCOSE BLDC GLUCOMTR-MCNC: 133 MG/DL — HIGH (ref 70–99)
GLUCOSE BLDC GLUCOMTR-MCNC: 138 MG/DL — HIGH (ref 70–99)
GLUCOSE BLDC GLUCOMTR-MCNC: 169 MG/DL — HIGH (ref 70–99)
GLUCOSE BLDC GLUCOMTR-MCNC: 264 MG/DL — HIGH (ref 70–99)
GLUCOSE SERPL-MCNC: 111 MG/DL — HIGH (ref 70–99)
HCT VFR BLD CALC: 38.6 % — SIGNIFICANT CHANGE UP (ref 34.5–45)
HGB BLD-MCNC: 12.4 G/DL — SIGNIFICANT CHANGE UP (ref 11.5–15.5)
IMM GRANULOCYTES NFR BLD AUTO: 0.2 % — SIGNIFICANT CHANGE UP (ref 0–1.5)
LYMPHOCYTES # BLD AUTO: 3.44 K/UL — HIGH (ref 1–3.3)
LYMPHOCYTES # BLD AUTO: 37.1 % — SIGNIFICANT CHANGE UP (ref 13–44)
MCHC RBC-ENTMCNC: 28.4 PG — SIGNIFICANT CHANGE UP (ref 27–34)
MCHC RBC-ENTMCNC: 32.1 GM/DL — SIGNIFICANT CHANGE UP (ref 32–36)
MCV RBC AUTO: 88.3 FL — SIGNIFICANT CHANGE UP (ref 80–100)
MONOCYTES # BLD AUTO: 0.49 K/UL — SIGNIFICANT CHANGE UP (ref 0–0.9)
MONOCYTES NFR BLD AUTO: 5.3 % — SIGNIFICANT CHANGE UP (ref 2–14)
NEUTROPHILS # BLD AUTO: 5.07 K/UL — SIGNIFICANT CHANGE UP (ref 1.8–7.4)
NEUTROPHILS NFR BLD AUTO: 54.6 % — SIGNIFICANT CHANGE UP (ref 43–77)
NRBC # BLD: 0 /100 WBCS — SIGNIFICANT CHANGE UP (ref 0–0)
PLATELET # BLD AUTO: 178 K/UL — SIGNIFICANT CHANGE UP (ref 150–400)
POTASSIUM SERPL-MCNC: 3.8 MMOL/L — SIGNIFICANT CHANGE UP (ref 3.5–5.3)
POTASSIUM SERPL-SCNC: 3.8 MMOL/L — SIGNIFICANT CHANGE UP (ref 3.5–5.3)
PROT SERPL-MCNC: 6.2 G/DL — SIGNIFICANT CHANGE UP (ref 6–8.3)
RBC # BLD: 4.37 M/UL — SIGNIFICANT CHANGE UP (ref 3.8–5.2)
RBC # FLD: 12.5 % — SIGNIFICANT CHANGE UP (ref 10.3–14.5)
SODIUM SERPL-SCNC: 141 MMOL/L — SIGNIFICANT CHANGE UP (ref 135–145)
WBC # BLD: 9.28 K/UL — SIGNIFICANT CHANGE UP (ref 3.8–10.5)
WBC # FLD AUTO: 9.28 K/UL — SIGNIFICANT CHANGE UP (ref 3.8–10.5)

## 2020-07-28 PROCEDURE — 99233 SBSQ HOSP IP/OBS HIGH 50: CPT | Mod: GC

## 2020-07-28 PROCEDURE — 99254 IP/OBS CNSLTJ NEW/EST MOD 60: CPT

## 2020-07-28 PROCEDURE — 99222 1ST HOSP IP/OBS MODERATE 55: CPT

## 2020-07-28 RX ORDER — ACETAMINOPHEN 500 MG
650 TABLET ORAL EVERY 6 HOURS
Refills: 0 | Status: DISCONTINUED | OUTPATIENT
Start: 2020-07-28 | End: 2020-07-29

## 2020-07-28 RX ORDER — SODIUM CHLORIDE 9 MG/ML
1000 INJECTION, SOLUTION INTRAVENOUS
Refills: 0 | Status: DISCONTINUED | OUTPATIENT
Start: 2020-07-28 | End: 2020-07-29

## 2020-07-28 RX ADMIN — Medication 650 MILLIGRAM(S): at 05:38

## 2020-07-28 RX ADMIN — TACROLIMUS 3 MILLIGRAM(S): 5 CAPSULE ORAL at 05:33

## 2020-07-28 RX ADMIN — Medication 15 UNIT(S): at 12:36

## 2020-07-28 RX ADMIN — TACROLIMUS 3 MILLIGRAM(S): 5 CAPSULE ORAL at 17:36

## 2020-07-28 RX ADMIN — HEPARIN SODIUM 5000 UNIT(S): 5000 INJECTION INTRAVENOUS; SUBCUTANEOUS at 05:32

## 2020-07-28 RX ADMIN — Medication 325 MILLIGRAM(S): at 11:59

## 2020-07-28 RX ADMIN — Medication 5 MILLIGRAM(S): at 05:33

## 2020-07-28 RX ADMIN — Medication 3: at 12:36

## 2020-07-28 RX ADMIN — Medication 650 MILLIGRAM(S): at 06:08

## 2020-07-28 RX ADMIN — SODIUM CHLORIDE 100 MILLILITER(S): 9 INJECTION, SOLUTION INTRAVENOUS at 14:59

## 2020-07-28 RX ADMIN — Medication 81 MILLIGRAM(S): at 11:59

## 2020-07-28 RX ADMIN — INSULIN GLARGINE 32 UNIT(S): 100 INJECTION, SOLUTION SUBCUTANEOUS at 21:00

## 2020-07-28 RX ADMIN — Medication 650 MILLIGRAM(S): at 23:52

## 2020-07-28 RX ADMIN — Medication 15 UNIT(S): at 08:18

## 2020-07-28 RX ADMIN — Medication 25 MILLIGRAM(S): at 05:33

## 2020-07-28 RX ADMIN — CLOPIDOGREL BISULFATE 75 MILLIGRAM(S): 75 TABLET, FILM COATED ORAL at 11:59

## 2020-07-28 RX ADMIN — ATORVASTATIN CALCIUM 80 MILLIGRAM(S): 80 TABLET, FILM COATED ORAL at 21:00

## 2020-07-28 RX ADMIN — HEPARIN SODIUM 5000 UNIT(S): 5000 INJECTION INTRAVENOUS; SUBCUTANEOUS at 17:36

## 2020-07-28 RX ADMIN — Medication 15 UNIT(S): at 17:34

## 2020-07-28 NOTE — CONSULT NOTE ADULT - ATTENDING COMMENTS
Seen ex'ed dw'ed PA  Mod carotid stenosis- No apparent assoc'ed symps  Rec: med mgmt  FU outpt for surveillance

## 2020-07-28 NOTE — CONSULT NOTE ADULT - SUBJECTIVE AND OBJECTIVE BOX
Pt admitted yesterday 7/27/20.    At this time Pt reports that at 8:00PM 7/26/20 she felt vertiginous, so she went to bed.  Awakened at 11:30 PM to go to the toilet.  As she got out of bed and went to the toilet she felt slightly weak.  Sat on toilet; as she got up she collapsed.  (She has been having her period recently and passing large amounts of blood/clots and feels this has contributed to feeling weak/lightheaded).  After falling could not get up on her own.  Called for assistance.  Was taken to ED.     Per Admission H&P:    <Start of quote from H&P>  " History of Present Illness:  Reason for Admission: syncopy	  History of Present Illness: 	  39 F from home, self ambulating, works at [employer], with PMH of DM, CAD/stent( 2018), s/p kidney transplant (1999), in the ER for dizziness and syncopal episode today. Pt was in her usual state of health until last night when she noted sudden onset of dizziness while in the restroom. As per patient she was trying to reach for the toilet paper and noted sudden of dizziness that was associated with blackening out. She got up and noticed " room spinning " like sensation. She was unable to hold herself and felt on the R lateral side. She was down for almost 2-3 minutes and stood back again and sat on the toilet seat. She also endorsed mild palpitations and head aches during the episode,  Pt denies any Head trauma, LOC, chest pain, SOB, ringing sensation in ears.  Pt mentions having similar episode years ago.   States she has had heavy periods since she got started on Plavix, s/p stent placement in Dec 2019. menstruation started 4 days ago, passing large clots, 5-6 pads per day.       In the ED,   Pt is AAOX 3, no signs of any distress  Vitals- 129/91, hR 73  EKG- NSR  Trops 0.015,   BHCG- 3   WBC 12K  Cr 2.15  	     Review of Systems:  Other Review of Systems: All other review of systems negative, except as noted in HPI	      Allergies and Intolerances:        Allergies:  	No Known Allergies:     Home Medications:   * Patient Currently Takes Medications as of 30-Dec-2019 18:58 documented in Structured Notes  · 	Vitamin C 500 mg oral tablet: 1 tab(s) orally once a day   · 	ferrous sulfate 325 mg (65 mg elemental iron) oral delayed release tablet: 1 tab(s) orally once a day   · 	magnesium oxide 400 mg (240 mg elemental magnesium) oral tablet: 1 tab(s) orally 3 times a day (with meals)   · 	Cipro 250 mg oral tablet: 1 tab(s) orally 2 times a day   · 	HumaLOG Cartridge 100 units/mL subcutaneous solution: 15 unit(s) subcutaneous 3 times a day (before meals)   · 	Lantus Solostar Pen 100 units/mL subcutaneous solution: 32 unit(s) subcutaneous once a day (at bedtime)  · 	Prograf 1 mg oral capsule: 3 cap(s) orally every 12 hours  · 	predniSONE 5 mg oral tablet: 1 tab(s) orally once a day  · 	atorvastatin 80 mg oral tablet: 1 tab(s) orally once a day  · 	metoprolol succinate 25 mg oral tablet, extended release: 1 tab(s) orally once a day  · 	aspirin 81 mg oral tablet: 1 tab(s) orally once a day  · 	Plavix 75 mg oral tablet: 1 tab(s) orally once a day    .    Patient History:    Past Medical, Past Surgical, and Family History:  PAST MEDICAL HISTORY:  CAD (coronary artery disease)     DM (diabetes mellitus)     Kidney transplant recipient.     PAST SURGICAL HISTORY:  Kidney transplant recipient."  <End of quote from H&P>    I note from the Flowsheets tab that at 11:45 today:  "After ambulation (25 feet) BP: 128/86 and HR: 105  Dr. Segura made aware."   and that   Syst/chirinos  HR supine         128/80   93  Syst/chirinos HR standing       117/82   125    From experience I know that the reading taking after standing are typically done after several minutes (3-5).    Pt has been fluid repleted in hospital.         EXAMINATION    Awake, alert, normal comprehension, expression, prosody, articulation.  Morbidly obese.     PERRL; EOMI; smooth pursuit; normal saccades to targets; fields intact.  Normal facial/lingual movements.    No UE drift.  Danyelle four limbs WNL symmetric.    Fairly marked generalized weakness of limb muscles with not lateralized of focal pattern/distribution.      Gait: widened base related to adiposity; does not stand on toes (would not feel comfortable or able to).      Reflex                           Right    Left   Comment    Biceps                             0         0    adiposity interferes   Triceps                            0         0   adiposity interferes  Patellar                     leg jumps  leg jumps (Pt appears surprised when tendon is tapped)   Gastroc                          0/0      0/0  wo/w reinforcement  Plantar      P/LT sensation grossly intact.  Mild impairment of proprioception fingers/toes.

## 2020-07-28 NOTE — PROGRESS NOTE ADULT - SUBJECTIVE AND OBJECTIVE BOX
PGY-1 Progress Note discussed with attending    PAGER #: [[phone]] TILL 5:00 PM  PLEASE CONTACT ON CALL TEAM:  - On Call Team (Please refer to Harpreet) FROM 5:00 PM - 8:30PM  - Nightfloat Team FROM 8:30 -7:30 AM    CHIEF COMPLAINT & BRIEF HOSPITAL COURSE:    INTERVAL HPI/OVERNIGHT EVENTS:       REVIEW OF SYSTEMS:  CONSTITUTIONAL: No fever, weight loss, or fatigue  RESPIRATORY: No cough, wheezing, chills or hemoptysis; No shortness of breath  CARDIOVASCULAR: No chest pain, palpitations, dizziness, or leg swelling  GASTROINTESTINAL: No abdominal pain. No nausea, vomiting, or hematemesis; No diarrhea or constipation. No melena or hematochezia.  GENITOURINARY: No dysuria or hematuria, urinary frequency  NEUROLOGICAL: No headaches, memory loss, loss of strength, numbness, or tremors  SKIN: No itching, burning, rashes, or lesions     MEDICATIONS  (STANDING):  aspirin  chewable 81 milliGRAM(s) Oral daily  atorvastatin 80 milliGRAM(s) Oral at bedtime  clopidogrel Tablet 75 milliGRAM(s) Oral daily  ferrous    sulfate 325 milliGRAM(s) Oral daily  heparin   Injectable 5000 Unit(s) SubCutaneous every 12 hours  insulin glargine Injectable (LANTUS) 32 Unit(s) SubCutaneous at bedtime  insulin lispro (HumaLOG) corrective regimen sliding scale   SubCutaneous three times a day before meals  insulin lispro Injectable (HumaLOG) 15 Unit(s) SubCutaneous three times a day before meals  metoprolol succinate ER 25 milliGRAM(s) Oral daily  predniSONE   Tablet 5 milliGRAM(s) Oral daily  tacrolimus 3 milliGRAM(s) Oral every 12 hours    MEDICATIONS  (PRN):  acetaminophen   Tablet .. 650 milliGRAM(s) Oral every 6 hours PRN Mild Pain (1 - 3)      Vital Signs Last 24 Hrs  T(C): 36.6 (28 Jul 2020 08:00), Max: 36.9 (27 Jul 2020 15:30)  T(F): 97.8 (28 Jul 2020 08:00), Max: 98.4 (27 Jul 2020 15:30)  HR: 78 (28 Jul 2020 08:00) (59 - 90)  BP: 125/84 (28 Jul 2020 08:00) (111/67 - 129/87)  BP(mean): 76 (27 Jul 2020 19:00) (76 - 77)  RR: 18 (28 Jul 2020 08:00) (18 - 19)  SpO2: 98% (28 Jul 2020 08:00) (98% - 100%)    PHYSICAL EXAMINATION:  GENERAL: NAD, well built  HEAD:  Atraumatic, Normocephalic  EYES:  conjunctiva and sclera clear  NECK: Supple, No JVD, Normal thyroid  CHEST/LUNG: Clear to auscultation. Clear to percussion bilaterally; No rales, rhonchi, wheezing, or rubs  HEART: Regular rate and rhythm; No murmurs, rubs, or gallops  ABDOMEN: Soft, Nontender, Nondistended; Bowel sounds present  NERVOUS SYSTEM:  Alert & Oriented X3,    EXTREMITIES:  2+ Peripheral Pulses, No clubbing, cyanosis, or edema  SKIN: warm dry                          12.4   9.28  )-----------( 178      ( 28 Jul 2020 07:33 )             38.6     07-28    141  |  110<H>  |  22<H>  ----------------------------<  111<H>  3.8   |  22  |  1.72<H>    Ca    8.9      28 Jul 2020 07:33  Phos  2.9     07-27  Mg     2.1     07-27    TPro  6.2  /  Alb  2.6<L>  /  TBili  0.3  /  DBili  x   /  AST  13  /  ALT  21  /  AlkPhos  91  07-28    LIVER FUNCTIONS - ( 28 Jul 2020 07:33 )  Alb: 2.6 g/dL / Pro: 6.2 g/dL / ALK PHOS: 91 U/L / ALT: 21 U/L DA / AST: 13 U/L / GGT: x           CARDIAC MARKERS ( 27 Jul 2020 10:49 )  <0.015 ng/mL / x     / x     / x     / x      CARDIAC MARKERS ( 27 Jul 2020 02:29 )  <0.015 ng/mL / x     / x     / x     / x          PT/INR - ( 27 Jul 2020 02:29 )   PT: 11.1 sec;   INR: 0.95 ratio         PTT - ( 27 Jul 2020 02:29 )  PTT:26.0 sec        CAPILLARY BLOOD GLUCOSE  CAPILLARY BLOOD GLUCOSE      POCT Blood Glucose.: 133 mg/dL (28 Jul 2020 08:07)    CAPILLARY BLOOD GLUCOSE      POCT Blood Glucose.: 133 mg/dL (28 Jul 2020 08:07)  POCT Blood Glucose.: 168 mg/dL (27 Jul 2020 21:43)  POCT Blood Glucose.: 241 mg/dL (27 Jul 2020 17:22)  POCT Blood Glucose.: 137 mg/dL (27 Jul 2020 12:27)      RADIOLOGY & ADDITIONAL TESTS: PGY-1 Progress Note discussed with attending    PAGER #: [[phone]] TILL 5:00 PM  PLEASE CONTACT ON CALL TEAM:  - On Call Team (Please refer to Harpreet) FROM 5:00 PM - 8:30PM  - Nightfloat Team FROM 8:30 -7:30 AM    CHIEF COMPLAINT & BRIEF HOSPITAL COURSE:    39 F from home, self ambulating, works at [employer], with PMH of DM, CAD/stent( 2018), s/p kidney transplant (1999), in the ER for dizziness and syncopal episode today. Pt was in her usual state of health until last night when she noted sudden onset of dizziness while in the restroom. As per patient she was trying to reach for the toilet paper and noted sudden of dizziness that was associated with blackening out. She got up and noticed " room spinning " like sensation. She was unable to hold herself and felt on the R lateral side. She was down for almost 2-3 minutes and stood back again and sat on the toilet seat. She also endorsed mild palpitations and head aches during the episode,  Pt denies any Head trauma, LOC, chest pain, SOB, ringing sensation in ears.  Pt mentions having similar episode years ago.   States she has had heavy periods since she got started on Plavix, s/p stent placement in Dec 2019. menstruation started 4 days ago, passing large clots, 5-6 pads per day.           INTERVAL HPI/OVERNIGHT EVENTS:   patient examined bed side . no acute events overnight.      REVIEW OF SYSTEMS:  CONSTITUTIONAL: No fever, weight loss, or fatigue  RESPIRATORY: No cough, wheezing, chills or hemoptysis; No shortness of breath  CARDIOVASCULAR: No chest pain, palpitations, dizziness, or leg swelling  GASTROINTESTINAL: No abdominal pain. No nausea, vomiting, or hematemesis; No diarrhea or constipation. No melena or hematochezia.  GENITOURINARY: No dysuria or hematuria, urinary frequency  NEUROLOGICAL: No headaches, memory loss, loss of strength, numbness, or tremors  SKIN: No itching, burning, rashes, or lesions     MEDICATIONS  (STANDING):  aspirin  chewable 81 milliGRAM(s) Oral daily  atorvastatin 80 milliGRAM(s) Oral at bedtime  clopidogrel Tablet 75 milliGRAM(s) Oral daily  ferrous    sulfate 325 milliGRAM(s) Oral daily  heparin   Injectable 5000 Unit(s) SubCutaneous every 12 hours  insulin glargine Injectable (LANTUS) 32 Unit(s) SubCutaneous at bedtime  insulin lispro (HumaLOG) corrective regimen sliding scale   SubCutaneous three times a day before meals  insulin lispro Injectable (HumaLOG) 15 Unit(s) SubCutaneous three times a day before meals  metoprolol succinate ER 25 milliGRAM(s) Oral daily  predniSONE   Tablet 5 milliGRAM(s) Oral daily  tacrolimus 3 milliGRAM(s) Oral every 12 hours    MEDICATIONS  (PRN):  acetaminophen   Tablet .. 650 milliGRAM(s) Oral every 6 hours PRN Mild Pain (1 - 3)      Vital Signs Last 24 Hrs  T(C): 36.6 (28 Jul 2020 08:00), Max: 36.9 (27 Jul 2020 15:30)  T(F): 97.8 (28 Jul 2020 08:00), Max: 98.4 (27 Jul 2020 15:30)  HR: 78 (28 Jul 2020 08:00) (59 - 90)  BP: 125/84 (28 Jul 2020 08:00) (111/67 - 129/87)  BP(mean): 76 (27 Jul 2020 19:00) (76 - 77)  RR: 18 (28 Jul 2020 08:00) (18 - 19)  SpO2: 98% (28 Jul 2020 08:00) (98% - 100%)    PHYSICAL EXAMINATION:  GENERAL: NAD, well built  HEAD:  Atraumatic, Normocephalic  EYES:  conjunctiva and sclera clear  NECK: Supple, No JVD, Normal thyroid  CHEST/LUNG: Clear to auscultation. Clear to percussion bilaterally; No rales, rhonchi, wheezing, or rubs  HEART: Regular rate and rhythm; No murmurs, rubs, or gallops  ABDOMEN: Soft, Nontender, Nondistended; Bowel sounds present  NERVOUS SYSTEM:  Alert & Oriented X3,    EXTREMITIES:  2+ Peripheral Pulses, No clubbing, cyanosis, or edema  SKIN: warm dry                          12.4   9.28  )-----------( 178      ( 28 Jul 2020 07:33 )             38.6     07-28    141  |  110<H>  |  22<H>  ----------------------------<  111<H>  3.8   |  22  |  1.72<H>    Ca    8.9      28 Jul 2020 07:33  Phos  2.9     07-27  Mg     2.1     07-27    TPro  6.2  /  Alb  2.6<L>  /  TBili  0.3  /  DBili  x   /  AST  13  /  ALT  21  /  AlkPhos  91  07-28    LIVER FUNCTIONS - ( 28 Jul 2020 07:33 )  Alb: 2.6 g/dL / Pro: 6.2 g/dL / ALK PHOS: 91 U/L / ALT: 21 U/L DA / AST: 13 U/L / GGT: x           CARDIAC MARKERS ( 27 Jul 2020 10:49 )  <0.015 ng/mL / x     / x     / x     / x      CARDIAC MARKERS ( 27 Jul 2020 02:29 )  <0.015 ng/mL / x     / x     / x     / x          PT/INR - ( 27 Jul 2020 02:29 )   PT: 11.1 sec;   INR: 0.95 ratio         PTT - ( 27 Jul 2020 02:29 )  PTT:26.0 sec        CAPILLARY BLOOD GLUCOSE  CAPILLARY BLOOD GLUCOSE      POCT Blood Glucose.: 133 mg/dL (28 Jul 2020 08:07)    CAPILLARY BLOOD GLUCOSE      POCT Blood Glucose.: 133 mg/dL (28 Jul 2020 08:07)  POCT Blood Glucose.: 168 mg/dL (27 Jul 2020 21:43)  POCT Blood Glucose.: 241 mg/dL (27 Jul 2020 17:22)  POCT Blood Glucose.: 137 mg/dL (27 Jul 2020 12:27)      RADIOLOGY & ADDITIONAL TESTS:    ct head :   No acute intracranial hemorrhage or acute territorial infarct.  If symptoms persist, follow-up MRI exam recommended.  us neck :   Elevated end diastolic velocity for the left ICA, suggestive of a moderate (50-69%) stenosis.    No hemodynamically significant stenosis in the right internal carotid artery by velocity criteria.

## 2020-07-28 NOTE — PHYSICAL THERAPY INITIAL EVALUATION ADULT - CRITERIA FOR SKILLED THERAPEUTIC INTERVENTIONS
therapy frequency/predicted duration of therapy intervention/anticipated discharge recommendation/rehab potential

## 2020-07-28 NOTE — CONSULT NOTE ADULT - SUBJECTIVE AND OBJECTIVE BOX
Attending:  Dr Waters     HPI:  39 F from home, self ambulating, works at [employer], with PMH of DM, CAD/stent( ), s/p kidney transplant () admitted to medical services w/ dizziness and syncopal episode; Vascular surgery called re left moderate ICA stenosis as seen on duplex; Pt seen and examined at bedside, admits to episode of dizziness and lightheadedness associated with syncopal episode, pt attributes her dizziness/ lightheadedness to heavy menstrual cycles, admits to similar episode few yrs ago; Pt denies any head trauma, no weakness, no slurred speech; Pt is a former smoker, quit in .               PAST MEDICAL & SURGICAL HISTORY:  CAD (coronary artery disease)  Kidney transplant recipient  DM (diabetes mellitus)  Kidney transplant recipient      MEDICATIONS  (STANDING):  aspirin  chewable 81 milliGRAM(s) Oral daily  atorvastatin 80 milliGRAM(s) Oral at bedtime  clopidogrel Tablet 75 milliGRAM(s) Oral daily  ferrous    sulfate 325 milliGRAM(s) Oral daily  heparin   Injectable 5000 Unit(s) SubCutaneous every 12 hours  insulin glargine Injectable (LANTUS) 32 Unit(s) SubCutaneous at bedtime  insulin lispro (HumaLOG) corrective regimen sliding scale   SubCutaneous three times a day before meals  insulin lispro Injectable (HumaLOG) 15 Unit(s) SubCutaneous three times a day before meals  metoprolol succinate ER 25 milliGRAM(s) Oral daily  predniSONE   Tablet 5 milliGRAM(s) Oral daily  tacrolimus 3 milliGRAM(s) Oral every 12 hours    MEDICATIONS  (PRN):  acetaminophen   Tablet .. 650 milliGRAM(s) Oral every 6 hours PRN Mild Pain (1 - 3)      Allergies    No Known Allergies    Intolerances        SOCIAL HISTORY:  Former smoker, quit    FAMILY HISTORY:  No pertinent family history in first degree relatives      Vital Signs Last 24 Hrs  T(C): 36.7 (2020 11:45), Max: 36.9 (2020 15:30)  T(F): 98 (2020 11:45), Max: 98.4 (2020 15:30)  HR: 78 (2020 08:00) (59 - 90)  BP: 125/84 (2020 08:00) (111/67 - 125/84)  BP(mean): 76 (2020 19:00) (76 - 77)  RR: 18 (2020 08:00) (18 - 19)  SpO2: 100% (2020 11:45) (98% - 100%)    I&O's Summary      LABS:                        12.4   9.28  )-----------( 178      ( 2020 07:33 )             38.6     07-28    141  |  110<H>  |  22<H>  ----------------------------<  111<H>  3.8   |  22  |  1.72<H>    Ca    8.9      2020 07:33  Phos  2.9     27  Mg     2.1     27    TPro  6.2  /  Alb  2.6<L>  /  TBili  0.3  /  DBili  x   /  AST  13  /  ALT  21  /  AlkPhos  91  -28    PT/INR - ( 2020 02:29 )   PT: 11.1 sec;   INR: 0.95 ratio         PTT - ( 2020 02:29 )  PTT:26.0 sec  Urinalysis Basic - ( 2020 04:45 )    Color: Red / Appearance: Slightly Turbid / S.020 / pH: x  Gluc: x / Ketone: Trace  / Bili: Negative / Urobili: Negative   Blood: x / Protein: 100 / Nitrite: Negative   Leuk Esterase: Small / RBC: >50 /HPF / WBC 3-5 /HPF   Sq Epi: x / Non Sq Epi: Few /HPF / Bacteria: Few /HPF      CAPILLARY BLOOD GLUCOSE      POCT Blood Glucose.: 264 mg/dL (2020 12:01)  POCT Blood Glucose.: 133 mg/dL (2020 08:07)  POCT Blood Glucose.: 168 mg/dL (2020 21:43)  POCT Blood Glucose.: 241 mg/dL (2020 17:22)    LIVER FUNCTIONS - ( 2020 07:33 )  Alb: 2.6 g/dL / Pro: 6.2 g/dL / ALK PHOS: 91 U/L / ALT: 21 U/L DA / AST: 13 U/L / GGT: x        RADIOLOGY & ADDITIONAL STUDIES:  < from: US Duplex Carotid Arteries Complete, Bilateral (20 @ 11:53) >  Indication: syncope    Comparison: None    Carotid duplex Doppler ultrasound is performed. Grayscale ultrasound demonstrates no significant atherosclerotic plaque in the carotid arteries. The flow velocity measurements during peak systolic phase in centimeters per second are as the following:    Right CCA 59, ICA 64, ECA 53.  Left CCA 74, ICA 84,ECA 71.    The end diastolic velocity measurement for the right ICA is 34, and  for the left ICA is 46.    The peak systolic ICA/CCA velocity ratio on the right is 1.1, and on the left is 1.1.    Both vertebral arteries maintain normal antegrade flowdirection.    Impression: Elevated end diastolic velocity for the left ICA, suggestive of a moderate (50-69%) stenosis.    No hemodynamically significant stenosis in the right internal carotid artery by velocity criteria.    < end of copied text >    < from: CT Head No Cont (20 @ 06:36) >  FINDINGS:  There is no acute intracranial hemorrhage, parenchymal mass, mass effect or midline shift. There is no acute territorial infarct. There is no hydrocephalus.    The cranium is intact. The visualized paranasal sinuses are well-aerated.    IMPRESSION:  No acute intracranial hemorrhage or acute territorial infarct.  If symptoms persist, follow-up MRI exam recommended.    < end of copied text >    < from: Transthoracic Echocardiogram (20 @ 07:14) >  CONCLUSIONS:  1. Trace mitral regurgitation.  2. Normal left ventricular internal dimensions and wall  thicknesses.  3. Normal left ventricular systolic function.  4. Normal right ventricular size and function.    < end of copied text >

## 2020-07-28 NOTE — CONSULT NOTE ADULT - NEGATIVE NEUROLOGICAL SYMPTOMS
no transient paralysis/no focal seizures/no weakness/no generalized seizures/no loss of consciousness/no paresthesias

## 2020-07-28 NOTE — PROGRESS NOTE ADULT - ATTENDING COMMENTS
39 year old woman with PMH of CAD s/p PCI with 1 stent on plavix, ESRD s/p kidney transplant ('99) admitted after syncopal episode.     INTERVAL HPI/OVERNIGHT EVENTS: offers no new complaints today  MEDICATIONS  (STANDING):  aspirin  chewable 81 milliGRAM(s) Oral daily  atorvastatin 80 milliGRAM(s) Oral at bedtime  clopidogrel Tablet 75 milliGRAM(s) Oral daily  ferrous    sulfate 325 milliGRAM(s) Oral daily  heparin   Injectable 5000 Unit(s) SubCutaneous every 12 hours  insulin glargine Injectable (LANTUS) 32 Unit(s) SubCutaneous at bedtime  insulin lispro (HumaLOG) corrective regimen sliding scale   SubCutaneous three times a day before meals  insulin lispro Injectable (HumaLOG) 15 Unit(s) SubCutaneous three times a day before meals  lactated ringers. 1000 milliLiter(s) (100 mL/Hr) IV Continuous <Continuous>  metoprolol succinate ER 25 milliGRAM(s) Oral daily  predniSONE   Tablet 5 milliGRAM(s) Oral daily  tacrolimus 3 milliGRAM(s) Oral every 12 hours    MEDICATIONS  (PRN):  acetaminophen   Tablet .. 650 milliGRAM(s) Oral every 6 hours PRN Mild Pain (1 - 3)      ----------------------------------------------------------------------------------  REVIEW OF SYSTEMS: negative  Vital Signs Last 24 Hrs  T(C): 36.8 (2020 15:13), Max: 36.8 (2020 04:40)  T(F): 98.2 (2020 15:13), Max: 98.2 (2020 04:40)  HR: 92 (2020 15:13) (77 - 92)  BP: 119/92 (2020 15:13) (119/78 - 125/84)  BP(mean): --  RR: 18 (2020 15:13) (18 - 19)  SpO2: 100% (2020 15:13) (98% - 100%)    _________________  PHYSICAL EXAM:  ---------------------------   NAD; Normocephalic;   LUNGS - no wheezing  HEART: S1 S2+   ABDOMEN: Soft, Nontender, non distended  EXTREMITIES: no cyanosis; no edema  NERVOUS SYSTEM:  Awake and alert; no focal neuro deficits appreciated    _________________________________________________  LABS:                        12.4   9.28  )-----------( 178      ( 2020 07:33 )             38.6     07-28    141  |  110<H>  |  22<H>  ----------------------------<  111<H>  3.8   |  22  |  1.72<H>    Ca    8.9      2020 07:33  Phos  2.9     27  Mg     2.1         TPro  6.2  /  Alb  2.6<L>  /  TBili  0.3  /  DBili  x   /  AST  13  /  ALT  21  /  AlkPhos  91  07-28  PT/INR - ( 2020 02:29 )   PT: 11.1 sec;   INR: 0.95 ratio    PTT - ( 2020 02:29 )  PTT:26.0 sec  Urinalysis Basic - ( 2020 04:45 )  Color: Red / Appearance: Slightly Turbid / S.020 / pH: x  Gluc: x / Ketone: Trace  / Bili: Negative / Urobili: Negative   Blood: x / Protein: 100 / Nitrite: Negative   Leuk Esterase: Small / RBC: >50 /HPF / WBC 3-5 /HPF   Sq Epi: x / Non Sq Epi: Few /HPF / Bacteria: Few /HPF      CAPILLARY BLOOD GLUCOSE      POCT Blood Glucose.: 138 mg/dL (2020 16:55)  POCT Blood Glucose.: 264 mg/dL (2020 12:01)  POCT Blood Glucose.: 133 mg/dL (2020 08:07)  POCT Blood Glucose.: 168 mg/dL (2020 21:43)    A/P  - dizziness and syncope - likely orthostatic   - CAD s/p PCI   - ESRD s/p renal transplant  - Hx of chronic iron deficiency anemia - resolved with iron supplementation  - Hx of dysfunctional uterine bleeding - resolving  - DM 2    Plan:  orthostatic vitals noted, borderline   will give gentle fluid hydration   repeat orthostatic in AM   CD 50-69% stenosis on left - medical management - aspirin and plavix   - ECHO - EF>55  HbA1c level 11- FS well controlled in hospital - likely medical non compliance   - Bedrest /fall risk  - Neurology consult  - home medications reviewed and restarted as indicated   discharge planning in AM   PT consult

## 2020-07-28 NOTE — PROGRESS NOTE ADULT - ASSESSMENT
39 F from home, self ambulating ww DM, CAD/stent, s/p kidney transplant in 1999, in the ER for dizziness and syncopal episode today

## 2020-07-28 NOTE — CONSULT NOTE ADULT - ASSESSMENT
38 y/o Female w/ moderate left ICA stenosis     -Carotid duplex images reviewed, no acute vascular surgery intervention at this time   -Recommend medical management  -BP control   -C/w Statins   -D/w Dr Waters and agrees 38 y/o Female w/ moderate left ICA stenosis     -Carotid duplex images reviewed   -Recommend CTA neck   -BP control   -C/w Statins   -D/w Dr Waters and agrees 38 y/o Female w/ moderate left ICA stenosis     -Carotid duplex images reviewed   -Recommend medical management    -BP control   -C/w Statins   -D/w Dr Waters and agrees

## 2020-07-28 NOTE — CONSULT NOTE ADULT - ASSESSMENT
Pre-syncope in the setting of hypovolemia.    Generalized asthenia (contributes to risk of orthostatic lightheadedness/vertigo as poor muscle tone reduces compression of capacitance vessels on orthostasis).     Proprioceptive impairment with known risk factors: DM; immunosuppressive Rx).      No primary neurologic etiology for syncope/pre-syncope/vertigo/lightheadedness.  No further neurologic evaluation indicated at this time.

## 2020-07-29 ENCOUNTER — TRANSCRIPTION ENCOUNTER (OUTPATIENT)
Age: 40
End: 2020-07-29

## 2020-07-29 VITALS
RESPIRATION RATE: 18 BRPM | OXYGEN SATURATION: 100 % | TEMPERATURE: 99 F | SYSTOLIC BLOOD PRESSURE: 136 MMHG | HEART RATE: 75 BPM | DIASTOLIC BLOOD PRESSURE: 86 MMHG

## 2020-07-29 LAB
ALBUMIN SERPL ELPH-MCNC: 2.5 G/DL — LOW (ref 3.5–5)
ALP SERPL-CCNC: 87 U/L — SIGNIFICANT CHANGE UP (ref 40–120)
ALT FLD-CCNC: 21 U/L DA — SIGNIFICANT CHANGE UP (ref 10–60)
ANION GAP SERPL CALC-SCNC: 7 MMOL/L — SIGNIFICANT CHANGE UP (ref 5–17)
AST SERPL-CCNC: 17 U/L — SIGNIFICANT CHANGE UP (ref 10–40)
BILIRUB SERPL-MCNC: 0.3 MG/DL — SIGNIFICANT CHANGE UP (ref 0.2–1.2)
BUN SERPL-MCNC: 22 MG/DL — HIGH (ref 7–18)
CALCIUM SERPL-MCNC: 8.6 MG/DL — SIGNIFICANT CHANGE UP (ref 8.4–10.5)
CHLORIDE SERPL-SCNC: 110 MMOL/L — HIGH (ref 96–108)
CO2 SERPL-SCNC: 24 MMOL/L — SIGNIFICANT CHANGE UP (ref 22–31)
CREAT SERPL-MCNC: 1.84 MG/DL — HIGH (ref 0.5–1.3)
CULTURE RESULTS: SIGNIFICANT CHANGE UP
GLUCOSE BLDC GLUCOMTR-MCNC: 142 MG/DL — HIGH (ref 70–99)
GLUCOSE BLDC GLUCOMTR-MCNC: 158 MG/DL — HIGH (ref 70–99)
GLUCOSE BLDC GLUCOMTR-MCNC: 96 MG/DL — SIGNIFICANT CHANGE UP (ref 70–99)
GLUCOSE SERPL-MCNC: 124 MG/DL — HIGH (ref 70–99)
HCT VFR BLD CALC: 38.3 % — SIGNIFICANT CHANGE UP (ref 34.5–45)
HGB BLD-MCNC: 11.8 G/DL — SIGNIFICANT CHANGE UP (ref 11.5–15.5)
MAGNESIUM SERPL-MCNC: 1.8 MG/DL — SIGNIFICANT CHANGE UP (ref 1.6–2.6)
MCHC RBC-ENTMCNC: 28 PG — SIGNIFICANT CHANGE UP (ref 27–34)
MCHC RBC-ENTMCNC: 30.8 GM/DL — LOW (ref 32–36)
MCV RBC AUTO: 90.8 FL — SIGNIFICANT CHANGE UP (ref 80–100)
NRBC # BLD: 0 /100 WBCS — SIGNIFICANT CHANGE UP (ref 0–0)
PHOSPHATE SERPL-MCNC: 3.5 MG/DL — SIGNIFICANT CHANGE UP (ref 2.5–4.5)
PLATELET # BLD AUTO: 181 K/UL — SIGNIFICANT CHANGE UP (ref 150–400)
POTASSIUM SERPL-MCNC: 3.8 MMOL/L — SIGNIFICANT CHANGE UP (ref 3.5–5.3)
POTASSIUM SERPL-SCNC: 3.8 MMOL/L — SIGNIFICANT CHANGE UP (ref 3.5–5.3)
PROT SERPL-MCNC: 6.1 G/DL — SIGNIFICANT CHANGE UP (ref 6–8.3)
RBC # BLD: 4.22 M/UL — SIGNIFICANT CHANGE UP (ref 3.8–5.2)
RBC # FLD: 12.7 % — SIGNIFICANT CHANGE UP (ref 10.3–14.5)
SODIUM SERPL-SCNC: 141 MMOL/L — SIGNIFICANT CHANGE UP (ref 135–145)
SPECIMEN SOURCE: SIGNIFICANT CHANGE UP
TACROLIMUS SERPL-MCNC: 20.7 NG/ML — SIGNIFICANT CHANGE UP
WBC # BLD: 8.94 K/UL — SIGNIFICANT CHANGE UP (ref 3.8–10.5)
WBC # FLD AUTO: 8.94 K/UL — SIGNIFICANT CHANGE UP (ref 3.8–10.5)

## 2020-07-29 PROCEDURE — 86901 BLOOD TYPING SEROLOGIC RH(D): CPT

## 2020-07-29 PROCEDURE — 85730 THROMBOPLASTIN TIME PARTIAL: CPT

## 2020-07-29 PROCEDURE — 84443 ASSAY THYROID STIM HORMONE: CPT

## 2020-07-29 PROCEDURE — 84702 CHORIONIC GONADOTROPIN TEST: CPT

## 2020-07-29 PROCEDURE — 85610 PROTHROMBIN TIME: CPT

## 2020-07-29 PROCEDURE — 86850 RBC ANTIBODY SCREEN: CPT

## 2020-07-29 PROCEDURE — 82652 VIT D 1 25-DIHYDROXY: CPT

## 2020-07-29 PROCEDURE — 97162 PT EVAL MOD COMPLEX 30 MIN: CPT

## 2020-07-29 PROCEDURE — 81001 URINALYSIS AUTO W/SCOPE: CPT

## 2020-07-29 PROCEDURE — 82746 ASSAY OF FOLIC ACID SERUM: CPT

## 2020-07-29 PROCEDURE — 83036 HEMOGLOBIN GLYCOSYLATED A1C: CPT

## 2020-07-29 PROCEDURE — 85027 COMPLETE CBC AUTOMATED: CPT

## 2020-07-29 PROCEDURE — 71046 X-RAY EXAM CHEST 2 VIEWS: CPT

## 2020-07-29 PROCEDURE — 86900 BLOOD TYPING SEROLOGIC ABO: CPT

## 2020-07-29 PROCEDURE — 86703 HIV-1/HIV-2 1 RESULT ANTBDY: CPT

## 2020-07-29 PROCEDURE — U0003: CPT

## 2020-07-29 PROCEDURE — 80197 ASSAY OF TACROLIMUS: CPT

## 2020-07-29 PROCEDURE — 81025 URINE PREGNANCY TEST: CPT

## 2020-07-29 PROCEDURE — 84484 ASSAY OF TROPONIN QUANT: CPT

## 2020-07-29 PROCEDURE — 87086 URINE CULTURE/COLONY COUNT: CPT

## 2020-07-29 PROCEDURE — 82962 GLUCOSE BLOOD TEST: CPT

## 2020-07-29 PROCEDURE — 80048 BASIC METABOLIC PNL TOTAL CA: CPT

## 2020-07-29 PROCEDURE — 83735 ASSAY OF MAGNESIUM: CPT

## 2020-07-29 PROCEDURE — 70450 CT HEAD/BRAIN W/O DYE: CPT

## 2020-07-29 PROCEDURE — 86769 SARS-COV-2 COVID-19 ANTIBODY: CPT

## 2020-07-29 PROCEDURE — 82607 VITAMIN B-12: CPT

## 2020-07-29 PROCEDURE — 87040 BLOOD CULTURE FOR BACTERIA: CPT

## 2020-07-29 PROCEDURE — 93306 TTE W/DOPPLER COMPLETE: CPT

## 2020-07-29 PROCEDURE — 80053 COMPREHEN METABOLIC PANEL: CPT

## 2020-07-29 PROCEDURE — 80307 DRUG TEST PRSMV CHEM ANLYZR: CPT

## 2020-07-29 PROCEDURE — 93005 ELECTROCARDIOGRAM TRACING: CPT

## 2020-07-29 PROCEDURE — 99285 EMERGENCY DEPT VISIT HI MDM: CPT | Mod: 25

## 2020-07-29 PROCEDURE — 36415 COLL VENOUS BLD VENIPUNCTURE: CPT

## 2020-07-29 PROCEDURE — 84100 ASSAY OF PHOSPHORUS: CPT

## 2020-07-29 PROCEDURE — 99239 HOSP IP/OBS DSCHRG MGMT >30: CPT | Mod: GC

## 2020-07-29 PROCEDURE — 93880 EXTRACRANIAL BILAT STUDY: CPT

## 2020-07-29 RX ORDER — TACROLIMUS 5 MG/1
3 CAPSULE ORAL
Qty: 0 | Refills: 0 | DISCHARGE
Start: 2020-07-29

## 2020-07-29 RX ORDER — SODIUM CHLORIDE 9 MG/ML
1000 INJECTION INTRAMUSCULAR; INTRAVENOUS; SUBCUTANEOUS ONCE
Refills: 0 | Status: COMPLETED | OUTPATIENT
Start: 2020-07-29 | End: 2020-07-29

## 2020-07-29 RX ORDER — TACROLIMUS 5 MG/1
3 CAPSULE ORAL
Qty: 0 | Refills: 0 | DISCHARGE

## 2020-07-29 RX ADMIN — Medication 15 UNIT(S): at 17:00

## 2020-07-29 RX ADMIN — Medication 325 MILLIGRAM(S): at 11:34

## 2020-07-29 RX ADMIN — Medication 15 UNIT(S): at 08:04

## 2020-07-29 RX ADMIN — CLOPIDOGREL BISULFATE 75 MILLIGRAM(S): 75 TABLET, FILM COATED ORAL at 11:34

## 2020-07-29 RX ADMIN — SODIUM CHLORIDE 1000 MILLILITER(S): 9 INJECTION INTRAMUSCULAR; INTRAVENOUS; SUBCUTANEOUS at 11:35

## 2020-07-29 RX ADMIN — Medication 5 MILLIGRAM(S): at 06:07

## 2020-07-29 RX ADMIN — Medication 650 MILLIGRAM(S): at 00:45

## 2020-07-29 RX ADMIN — HEPARIN SODIUM 5000 UNIT(S): 5000 INJECTION INTRAVENOUS; SUBCUTANEOUS at 17:00

## 2020-07-29 RX ADMIN — HEPARIN SODIUM 5000 UNIT(S): 5000 INJECTION INTRAVENOUS; SUBCUTANEOUS at 06:07

## 2020-07-29 RX ADMIN — TACROLIMUS 3 MILLIGRAM(S): 5 CAPSULE ORAL at 17:00

## 2020-07-29 RX ADMIN — Medication 1: at 12:08

## 2020-07-29 RX ADMIN — TACROLIMUS 3 MILLIGRAM(S): 5 CAPSULE ORAL at 06:07

## 2020-07-29 RX ADMIN — Medication 81 MILLIGRAM(S): at 11:34

## 2020-07-29 RX ADMIN — Medication 25 MILLIGRAM(S): at 06:07

## 2020-07-29 RX ADMIN — Medication 15 UNIT(S): at 12:08

## 2020-07-29 NOTE — DISCHARGE NOTE PROVIDER - HOSPITAL COURSE
39 F from home, self ambulating, works at [employer], with PMH of DM, CAD/stent( 2018), s/p kidney transplant (1999), in the ER for dizziness and syncopal episode today. Pt was in her usual state of health until last night when she noted sudden onset of dizziness while in the restroom. As per patient she was trying to reach for the toilet paper and noted sudden of dizziness that was associated with blackening out. She got up and noticed " room spinning " like sensation. She was unable to hold herself and felt on the R lateral side. She was down for almost 2-3 minutes and stood back again and sat on the toilet seat. She also endorsed mild palpitations and head aches during the episode,  Pt denies any Head trauma, LOC, chest pain, SOB, ringing sensation in ears.  Pt mentions having similar episode years ago.         Pt was admitted after having a spell of dizziness while using the restroom with room spinning,     Pt fell down on the R lateral side but denies any LOC or head trauma , EKG showed NSR , troponins were negative.     ct head showed no acute pathology, Carotid doppler showed     Elevated end diastolic velocity for the left ICA, suggestive of a moderate (50-69%) stenosis.    Vascular surgery was consulted they recommended medical management with aspirin and statin.    Patient was boderline positive for orthostatic hypotension, they recommended adequate hydration .        Given patient's improved clinical status and current hemodynamic stability, decision was made to discharge the patient.    Patient is stable for discharge per attending and is advised to follow up with PCP as outpatient.

## 2020-07-29 NOTE — DISCHARGE NOTE PROVIDER - NSDCMRMEDTOKEN_GEN_ALL_CORE_FT
aspirin 81 mg oral tablet: 1 tab(s) orally once a day  atorvastatin 80 mg oral tablet: 1 tab(s) orally once a day  ferrous sulfate 325 mg (65 mg elemental iron) oral delayed release tablet: 1 tab(s) orally once a day   HumaLOG Cartridge 100 units/mL subcutaneous solution: 15 unit(s) subcutaneous 3 times a day (before meals)   Lantus Solostar Pen 100 units/mL subcutaneous solution: 32 unit(s) subcutaneous once a day (at bedtime)  metoprolol succinate 25 mg oral tablet, extended release: 1 tab(s) orally once a day  Plavix 75 mg oral tablet: 1 tab(s) orally once a day  predniSONE 5 mg oral tablet: 1 tab(s) orally once a day  tacrolimus 1 mg oral capsule: 3 cap(s) orally every 12 hours

## 2020-07-29 NOTE — DISCHARGE NOTE PROVIDER - NSDCCPCAREPLAN_GEN_ALL_CORE_FT
PRINCIPAL DISCHARGE DIAGNOSIS  Diagnosis: Syncope, unspecified syncope type  Assessment and Plan of Treatment: You were admitted after having a spell of dizziness while using the restroom with room spinning,   EKG showed NSR , troponins were negative.   CT head showed no acute pathology, Carotid doppler showed   Elevated end diastolic velocity for the left ICA, suggestive of a moderate (50-69%) stenosis.  Vascular surgery was consulted they recommended medical management with aspirin and statin.  You were boderline positive for orthostatic hypotension, they recommended adequate hydration . You are recomended to keep your self hydrated and follow up with PCP after discharge for further recommendations.      SECONDARY DISCHARGE DIAGNOSES  Diagnosis: S/P kidney transplant  Assessment and Plan of Treatment: You are recommended to continue with your medications and follow up with your nephrologist for further recommendations.    Diagnosis: DM (diabetes mellitus)  Assessment and Plan of Treatment: Maintaining blood glucose level within normal range.  - You have a history of diabetes  - You should continue to take your medication regimen regularly as prescribed  - Please follow up with your primary care provider/endocrinologist within a week of discharge.  - You need to continue monitoring your blood sugar levels closely.  - Please maintain healthy lifestyle by eating healthy diabetic regimen, weight loss and exercise regularly as tolerated.  Make sure you get your HbA1c checked every three months.      Diagnosis: CAD (coronary artery disease)  Assessment and Plan of Treatment: Contiue with your medications aspirin, statina nd plavix and follwo up with your PCP for further recommendations

## 2020-07-29 NOTE — DISCHARGE NOTE NURSING/CASE MANAGEMENT/SOCIAL WORK - PATIENT PORTAL LINK FT
You can access the FollowMyHealth Patient Portal offered by [facility] by registering at the following website: [email or web address]. By joining [employer]’s FollowMyHealth portal, you will also be able to view your health information using other applications (apps) compatible with our system.

## 2020-07-29 NOTE — DISCHARGE NOTE PROVIDER - ATTENDING COMMENTS
39 year old woman with PMH of CAD s/p PCI with 1 stent on plavix, ESRD s/p kidney transplant ('99) admitted after syncopal episode.         INTERVAL HPI/OVERNIGHT EVENTS: offers no new complaints today    MEDICATIONS  (STANDING):    aspirin  chewable 81 milliGRAM(s) Oral daily    atorvastatin 80 milliGRAM(s) Oral at bedtime    clopidogrel Tablet 75 milliGRAM(s) Oral daily    ferrous    sulfate 325 milliGRAM(s) Oral daily    heparin   Injectable 5000 Unit(s) SubCutaneous every 12 hours    insulin glargine Injectable (LANTUS) 32 Unit(s) SubCutaneous at bedtime    insulin lispro (HumaLOG) corrective regimen sliding scale   SubCutaneous three times a day before meals    insulin lispro Injectable (HumaLOG) 15 Unit(s) SubCutaneous three times a day before meals    lactated ringers. 1000 milliLiter(s) (100 mL/Hr) IV Continuous <Continuous>    metoprolol succinate ER 25 milliGRAM(s) Oral daily    predniSONE   Tablet 5 milliGRAM(s) Oral daily    tacrolimus 3 milliGRAM(s) Oral every 12 hours        MEDICATIONS  (PRN):    acetaminophen   Tablet .. 650 milliGRAM(s) Oral every 6 hours PRN Mild Pain (1 - 3)            ----------------------------------------------------------------------------------    REVIEW OF SYSTEMS: negative    Vital Signs Last 24 Hrs    T(C): 36.8 (28 Jul 2020 15:13), Max: 36.8 (28 Jul 2020 04:40)    T(F): 98.2 (28 Jul 2020 15:13), Max: 98.2 (28 Jul 2020 04:40)    HR: 92 (28 Jul 2020 15:13) (77 - 92)    BP: 119/92 (28 Jul 2020 15:13) (119/78 - 125/84)    BP(mean): --    RR: 18 (28 Jul 2020 15:13) (18 - 19)    SpO2: 100% (28 Jul 2020 15:13) (98% - 100%)        _________________    PHYSICAL EXAM:    ---------------------------     NAD; Normocephalic;     LUNGS - no wheezing    HEART: S1 S2+     ABDOMEN: Soft, Nontender, non distended    EXTREMITIES: no cyanosis; no edema    NERVOUS SYSTEM:  Awake and alert; no focal neuro deficits appreciated        _________________________________________________                                11.8     8.94  )-----------( 181      ( 29 Jul 2020 07:12 )               38.3             07-29        141  |  110<H>  |  22<H>    ----------------------------<  124<H>    3.8   |  24  |  1.84<H>        Ca    8.6      29 Jul 2020 07:12    Phos  3.5     07-29    Mg     1.8     07-29        TPro  6.1  /  Alb  2.5<L>  /  TBili  0.3  /  DBili  x   /  AST  17  /  ALT  21  /  AlkPhos  87  07-29                CAPILLARY BLOOD GLUCOSE            POCT Blood Glucose.: 158 mg/dL (29 Jul 2020 11:50)            A/P    - dizziness and syncope - likely orthostatic     - CAD s/p PCI     - ESRD s/p renal transplant    - Hx of chronic iron deficiency anemia - resolved with iron supplementation    - Hx of dysfunctional uterine bleeding - resolving    - DM 2        Plan:    orthostatic continues to stay borderline positive but reports improvement in symptoms     patient encouraged to stay hydrated    CD 50-69% stenosis on left - medical management - aspirin and plavix     - ECHO - EF>55    HbA1c level 11- FS well controlled in hospital with home regimen - will discharge on home meds and outpatient endo follow up    - Neurology consult- recs noted    stable for discharge

## 2020-07-29 NOTE — DISCHARGE NOTE PROVIDER - NSDCPNMEDSTUDENT_GEN_ALL_CORE
I have reviewed and discussed the medical student's documentation and findings with the student. After personally examining my patient, my findings have been added to this documentation.

## 2020-07-29 NOTE — CONSULT NOTE ADULT - ASSESSMENT
CKD cause ? Chronic Rejection .  History of renal transplant in 1999.  MICHAEL on admission, Hyperglycemia and glycosuria. SG 1020.    Dizziness and pre syncopal episode , possible dehydration.  Renal function improved  Hematuria due to her menstrual period.    Carotid stenosis, no required urgent intervention.    I discussed the issues with the patient  Margo to follow with [facility].  She texted her Coordinator to call me.  She will follow with them this month.    Follow up BS and Insulin with her PMD.  Tacrolimus level to follow as an outpatient.

## 2020-07-29 NOTE — CONSULT NOTE ADULT - SUBJECTIVE AND OBJECTIVE BOX
Chief complain/HPI  39 F from home, self ambulating, works at [employer], with PMH of DM, CAD/stent( 2018), s/p kidney transplant (1999), in the ER for dizziness and syncopal episode today. Pt was in her usual state of health until last night when she noted sudden onset of dizziness while in the restroom. As per patient she was trying to reach for the toilet paper and noted sudden of dizziness that was associated with blackening out. She got up and noticed " room spinning " like sensation. She was unable to hold herself and felt on the R lateral side. She was down for almost 2-3 minutes and stood back again and sat on the toilet seat. She also endorsed mild palpitations and head aches during the episode,  Pt denies any Head trauma, LOC, chest pain, SOB, ringing sensation in ears.  Pt mentions having similar episode years ago.   States she has had heavy periods since she got started on Plavix, s/p stent placement in Dec 2019. menstruation started 4 days ago, passing large clots, 5-6 pads per day.         PAST MEDICAL & SURGICAL HISTORY:  CAD (coronary artery disease)  Kidney transplant recipient  DM (diabetes mellitus)  Kidney transplant recipient      Home Medications Reviewed    Hospital Medications:   MEDICATIONS  (STANDING):  aspirin  chewable 81 milliGRAM(s) Oral daily  atorvastatin 80 milliGRAM(s) Oral at bedtime  clopidogrel Tablet 75 milliGRAM(s) Oral daily  ferrous    sulfate 325 milliGRAM(s) Oral daily  heparin   Injectable 5000 Unit(s) SubCutaneous every 12 hours  insulin glargine Injectable (LANTUS) 32 Unit(s) SubCutaneous at bedtime  insulin lispro (HumaLOG) corrective regimen sliding scale   SubCutaneous three times a day before meals  insulin lispro Injectable (HumaLOG) 15 Unit(s) SubCutaneous three times a day before meals  lactated ringers. 1000 milliLiter(s) (100 mL/Hr) IV Continuous <Continuous>  metoprolol succinate ER 25 milliGRAM(s) Oral daily  predniSONE   Tablet 5 milliGRAM(s) Oral daily  tacrolimus 3 milliGRAM(s) Oral every 12 hours    MEDICATIONS  (PRN):  acetaminophen   Tablet .. 650 milliGRAM(s) Oral every 6 hours PRN Mild Pain (1 - 3)      Allergies    No Known Allergies    Intolerances                              11.8   8.94  )-----------( 181      ( 29 Jul 2020 07:12 )             38.3     07-29    141  |  110<H>  |  22<H>  ----------------------------<  124<H>  3.8   |  24  |  1.84<H>    Ca    8.6      29 Jul 2020 07:12  Phos  3.5     07-29  Mg     1.8     07-29    TPro  6.1  /  Alb  2.5<L>  /  TBili  0.3  /  DBili  x   /  AST  17  /  ALT  21  /  AlkPhos  87  07-29              RADIOLOGY & ADDITIONAL STUDIES:    SOCIAL HISTORY: Denies ETOh,Smoking,     FAMILY HISTORY:  No pertinent family history in first degree relatives      REVIEW OF SYSTEMS:  CONSTITUTIONAL: No malaise, No fatigue, No fevers or chills, well developed, no diaphoresis  EYES/ENT: No visual changes;  No vertigo or throat pain   NECK: No pain or stiffness  RESPIRATORY: No cough, wheezing, hemoptysis; No shortness of breath  CARDIOVASCULAR: No chest pain or palpitations. No edema  GASTROINTESTINAL: No abdominal or epigastric pain. No nausea, vomiting, or hematemesis; No diarrhea or constipation. No melena or hematochezia.  GENITOURINARY: No dysuria, frequency, foamy urine, urinary urgency, incontinence or hematuria  NEUROLOGICAL: No numbness or weakness, No tremor  SKIN: No itching, burning, rashes, or lesions   VASCULAR: No claudication  Musculoskeletal: no arthralgia, no myalgia  All other review of systems is negative unless indicated above.    VITALS:  Vital Signs Last 24 Hrs  T(C): 36.6 (29 Jul 2020 08:13), Max: 36.8 (28 Jul 2020 15:13)  T(F): 97.8 (29 Jul 2020 08:13), Max: 98.2 (28 Jul 2020 15:13)  HR: 70 (29 Jul 2020 08:13) (70 - 97)  BP: 132/88 (29 Jul 2020 08:13) (108/65 - 132/88)  BP(mean): --  RR: 19 (29 Jul 2020 08:13) (18 - 19)  SpO2: 97% (29 Jul 2020 08:13) (97% - 100%)      Weight (kg): 122 (07-28 @ 11:45)    PHYSICAL EXAM:  Constitutional: NAD  HEENT: anicteric sclera, oropharynx clear, MMM  Neck: No JVD  Respiratory: good air entrance B/L, no wheezes, rales or rhonchi  Cardiovascular: S1, S2, RRR, no pericardial rub, no murmur  Gastrointestinal: BS+, soft, no tenderness, no distension, no bruit  Pelvis: bladder non-distended, no CVA tenderness  Extremities: No cyanosis or clubbing. No peripheral edema  Neurological: A/O x 3, no focal deficits  Psychiatric: Normal mood, normal affect  : No CVA tenderness. No worley.   Skin: No rashes  Vascular: all pulses present  Access: Chief complain/HPI  39 F from home, RENAL TRANSPLANT SINCE 1999, the etiology of her renal failure is unknown to her.  Diabetes on Insulin for 3 years.   CAD/stent.  She came to the ER for  sudden onset of dizziness while in the restroom. As per patient she was trying to reach for the toilet paper and noted sudden of dizziness that was associated with blackening out. She got up and noticed " room spinning " like sensation. She was unable to hold herself and felt on the R lateral side. She was down for almost 2-3 minutes and stood back again and sat on the toilet seat. She also endorsed mild palpitations and head aches during the episode,  Pt denies any Head trauma, LOC, chest pain, SOB, ringing sensation in ears.  Pt mentions having similar episode years ago.   States she has had heavy periods since she got started on Plavix, s/p stent placement in Dec 2019. menstruation started 4 days before admission, o, passing large clots, 5-6 pads per day.   History of CAD post stent placement a year ago at [facility]  ETIOLOGY OF RENAL FAILURE IS UNKNOWN   IDDM ON treated with insulun - 3years.     No further episode sof dizziness since admission  Renal consult was called for MICHAEL on admission and Radiocontrast for carotids arteries.    Carotid duplex Doppler ultrasound is performed. Grayscale ultrasound demonstrates no significant atherosclerotic plaque in the carotid arteries. The flow velocity measurements during peak systolic phase in centimeters per second are as the following:    Right CCA 59, ICA 64, ECA 53.  Left CCA 74, ICA 84,ECA 71.    The end diastolic velocity measurement for the right ICA is 34, and  for the left ICA is 46.    The peak systolic ICA/CCA velocity ratio on the right is 1.1, and on the left is 1.1.    Both vertebral arteries maintain normal antegrade flowdirection.    Impression: Elevated end diastolic velocity for the left ICA, suggestive of a moderate (50-69%) stenosis.    No hemodynamically significant stenosis in the right internal carotid artery by velocity criteria.    Urine with Glycosuria on admission HbA1C 11.9  As per the patient her fluid intake is not sufficient  Admission Creatinine 2.15   Base line creatinine 1.7-1.9          PAST MEDICAL & SURGICAL HISTORY:  CAD (coronary artery disease)  Kidney transplant recipient  DM (diabetes mellitus)  Kidney transplant recipient      Home Medications Reviewed    Hospital Medications:   MEDICATIONS  (STANDING):  aspirin  chewable 81 milliGRAM(s) Oral daily  atorvastatin 80 milliGRAM(s) Oral at bedtime  clopidogrel Tablet 75 milliGRAM(s) Oral daily  ferrous    sulfate 325 milliGRAM(s) Oral daily  heparin   Injectable 5000 Unit(s) SubCutaneous every 12 hours  insulin glargine Injectable (LANTUS) 32 Unit(s) SubCutaneous at bedtime  insulin lispro (HumaLOG) corrective regimen sliding scale   SubCutaneous three times a day before meals  insulin lispro Injectable (HumaLOG) 15 Unit(s) SubCutaneous three times a day before meals  lactated ringers. 1000 milliLiter(s) (100 mL/Hr) IV Continuous <Continuous>  metoprolol succinate ER 25 milliGRAM(s) Oral daily  predniSONE   Tablet 5 milliGRAM(s) Oral daily  tacrolimus 3 milliGRAM(s) Oral every 12 hours    MEDICATIONS  (PRN):  acetaminophen   Tablet .. 650 milliGRAM(s) Oral every 6 hours PRN Mild Pain (1 - 3)      Allergies    No Known Allergies    Intolerances      Admission hemoglobin 13.0                        11.8   8.94  )-----------( 181      ( 29 Jul 2020 07:12 )             38.3     07-29    Urine Microscopic-Add On (NC) (07.27.20 @ 04:45)    Epithelial Cells: Few /HPF    Bacteria: Few /HPF    Red Blood Cell - Urine: >50 /HPF    White Blood Cell - Urine: 3-5 /HPF  Urinalysis (07.27.20 @ 04:45)    pH Urine: 5.0    Glucose Qualitative, Urine: 1000 mg/dL    Blood, Urine: Large    Color: Red    Urine Appearance: Slightly Turbid    Bilirubin: Negative    Ketone - Urine: Trace    Specific Gravity: 1.020    Protein, Urine: 100    Urobilinogen: Negative    Nitrite: Negative    Leukocyte Esterase Concentration: Small      141  |  110<H>  |  22<H>  ----------------------------<  124<H>  3.8   |  24  |  1.84<H>    Ca    8.6      29 Jul 2020 07:12  Phos  3.5     07-29  Mg     1.8     07-29    TPro  6.1  /  Alb  2.5<L>  /  TBili  0.3  /  DBili  x   /  AST  17  /  ALT  21  /  AlkPhos  87  07-29              RADIOLOGY & ADDITIONAL STUDIES:  < from: Transthoracic Echocardiogram (07.28.20 @ 07:14) >  Mitral Valve: Normal mitral valve. Trace mitral  regurgitation.  Aortic Root: Normal aortic root.  Aortic Valve: Aortic valve leaflet morphology not well  visualized, opens normally.  Left Atrium: Normal left atrium. LA volume index = 33  cc/m2.  Left Ventricle: Normal left ventricular systolic function.  Normal left ventricular internal dimensions and wall  thicknesses.  Right Heart: Normal right atrium. Normal right ventricular  size and function. There is mild tricuspid regurgitation.  There is trace pulmonic regurgitation.  Pericardium/PleuraNormal pericardium with no pericardial  effusion.  Hemodynamic: RA Pressure is 10 mm Hg. RV systolic pressure  is 35 mm Hg.  ------------------------------------------------------------------------  CONCLUSIONS:  1. Trace mitral regurgitation.  2. Normal left ventricular internal dimensions and wall  thicknesses.  3. Normal left ventricular systolic function.  4. Normal right ventricular size and function.    ------------------------------------------------------------------------  Confirmed on  7/28/2020 - 11:42:51 by Noel Quintanilla MD  ------------------------------------------------------------------------    < end of copied text >    TECHNIQUE: CT of the head was performed without IV contrast.  RAPID artificial intelligence was utilized for the preliminary evaluation of intracranial hemorrhage.    COMPARISON: CT head 12/20/2019    FINDINGS:  There is no acute intracranial hemorrhage, parenchymal mass, mass effect or midline shift. There is no acute territorial infarct. There is no hydrocephalus.    The cranium is intact. The visualized paranasal sinuses are well-aerated.    IMPRESSION:  No acute intracranial hemorrhage or acute territorial infarct.  If symptoms persist, follow-up MRI exam recommended.        SOCIAL HISTORY: Denies ETOh,Smoking,     FAMILY HISTORY:  No pertinent family history in first degree relatives      REVIEW OF SYSTEMS:  CONSTITUTIONAL: No malaise, No fatigue, No fevers or chills, well developed, no diaphoresis  EYES/ENT: No visual changes;  No vertigo or throat pain   NECK: No pain or stiffness  RESPIRATORY: No cough, wheezing, hemoptysis; No shortness of breath  CARDIOVASCULAR: No chest pain or palpitations. No edema  GASTROINTESTINAL: No abdominal or epigastric pain. No nausea, vomiting, or hematemesis; No diarrhea or constipation. No melena or hematochezia.  GENITOURINARY: No dysuria, frequency, foamy urine, urinary urgency, incontinence or hematuria  NEUROLOGICAL: No numbness or weakness, No tremor  SKIN: No itching, burning, rashes, or lesions   VASCULAR: No claudication  Musculoskeletal: no arthralgia, no myalgia  All other review of systems is negative unless indicated above.    VITALS:  Vital Signs Last 24 Hrs  T(C): 36.6 (29 Jul 2020 08:13), Max: 36.8 (28 Jul 2020 15:13)  T(F): 97.8 (29 Jul 2020 08:13), Max: 98.2 (28 Jul 2020 15:13)  HR: 70 (29 Jul 2020 08:13) (70 - 97)  BP: 132/88 (29 Jul 2020 08:13) (108/65 - 132/88)  BP(mean): --  RR: 19 (29 Jul 2020 08:13) (18 - 19)  SpO2: 97% (29 Jul 2020 08:13) (97% - 100%)      Weight (kg): 122 (07-28 @ 11:45)    PHYSICAL EXAM:  Constitutional: NAD  HEENT: anicteric sclera, oropharynx clear, MMM  Neck: No JVD  Respiratory: good air entrance B/L, no wheezes, rales or rhonchi  Cardiovascular: S1, S2, RRR, no pericardial rub, no murmur  Gastrointestinal: BS+, soft, no tenderness, no distension, no bruit  Pelvis: bladder non-distended, no CVA tenderness  Extremities: right pelvic kidney with no tenderness  Neurological: A/O x 3, no focal deficits

## 2020-10-06 NOTE — ED ADULT TRIAGE NOTE - PRO INTERPRETER NEED 2
EMERGENCY DEPARTMENT ENCOUNTER    Room Number:  03/03  Date of encounter:  10/6/2020  PCP: Estefanía Tran MD  Historian: Patient      HPI:  Chief Complaint: Right-sided low back pain    A complete HPI/ROS/PMH/PSH/SH/FH are unobtainable due to: NA    Context: Romulo Guerra is a 58 y.o. male who presents to the ED c/o right-sided low back pain, worse with standing.  Was scrubbed in for a case upstairs, and was unable to continue with procedure due to right lumbosacral pain.  Pain does not radiate into the lower extremities.  Not associated with bowel or bladder dysfunction.  No saddle anesthesia.  No known injury or overexertion.  No abdominal pain bloating or distention, dysuria hematuria or pyuria.  No fevers chills or sweats.  He does have a prior history of intermittent low back pain.  He has had intermittent low back spasms for the past 3 to 4 weeks.  This morning he took a Flexeril tramadol and dose of prednisone with no relief..  He has a history of lupus, takes Plaquenil 200 mg twice daily.    PAST MEDICAL HISTORY  Active Ambulatory Problems     Diagnosis Date Noted   • No Active Ambulatory Problems     Resolved Ambulatory Problems     Diagnosis Date Noted   • No Resolved Ambulatory Problems     Past Medical History:   Diagnosis Date   • Back pain    • Meniere's disease    • MVP (mitral valve prolapse)          PAST SURGICAL HISTORY  Past Surgical History:   Procedure Laterality Date   • BREAST SURGERY     • CHOLECYSTECTOMY     • D&C HYSTEROSCOPY ENDOMETRIAL ABLATION           FAMILY HISTORY  Family History   Problem Relation Age of Onset   • Cancer Mother    • No Known Problems Father          SOCIAL HISTORY  Social History     Socioeconomic History   • Marital status: Single     Spouse name: Not on file   • Number of children: Not on file   • Years of education: Not on file   • Highest education level: Not on file   Tobacco Use   • Smoking status: Never Smoker   • Smokeless tobacco: Never Used    Substance and Sexual Activity   • Alcohol use: Never     Frequency: Never   • Drug use: Never   • Sexual activity: Defer         ALLERGIES  Betadine [povidone iodine], Contrast dye, Iodine, and Shellfish-derived products        REVIEW OF SYSTEMS  Review of Systems     All systems reviewed and negative except for those discussed in HPI.       PHYSICAL EXAM    I have reviewed the triage vital signs and nursing notes.    ED Triage Vitals [10/06/20 0825]   Temp Heart Rate Resp BP SpO2   98.2 °F (36.8 °C) 90 18 129/79 96 %      Temp src Heart Rate Source Patient Position BP Location FiO2 (%)   Oral Monitor Sitting Left arm --       Physical Exam  GENERAL: Awake alert and oriented, appears to be in a significant amount of discomfort, hemodynamically stable.  Well developed.  Appears in no acute distress.  HENT: Nares patent  EYES: No scleral icterus  CV: Regular rhythm, regular rate  RESPIRATORY: Normal effort.  No audible wheezes, rales or rhonchi  ABDOMEN: Soft, nontender  MUSCULOSKELETAL: No deformities.  Tenderness to right SI notch, reproduces patient's symptoms, causing him to wince and recoil.  Straight leg raises are negative, patellar DTRs are 2+/4+.  NEURO: Alert, moves all extremities, follows commands  SKIN: Warm, dry, no rash visualized        LAB RESULTS  No results found for this or any previous visit (from the past 24 hour(s)).    Ordered the above labs and independently reviewed the results.        RADIOLOGY  Ct Lumbar Spine Without Contrast    Result Date: 10/6/2020  EXAMINATION: CT LUMBAR SPINE WO CONTRAST-  INDICATION: LBP, R SI pain / TTP  TECHNIQUE: Axial noncontrast CT of the lumbar spine with multiplanar reconstruction.  The radiation dose reduction device was turned on for each scan per the ALARA (As Low as Reasonably Achievable) protocol.  COMPARISON: NONE  FINDINGS: Vertebral body heights are maintained without evidence of acute fracture or subluxation. Left-sided pars interarticularis defect  noted at L2-3. Alignment is anatomic without evidence of subluxation or significant spondylolisthesis. The paraspinal soft tissues demonstrate some aortoiliac atherosclerosis, otherwise unremarkable. Mild multilevel lumbar spondylosis changes are noted with areas of circumferential disc bulge and bilateral facet arthropathy. No definite apparent severe associated spinal canal narrowing. Suspect multilevel moderate neuroforaminal stenosis. Prominent bilateral facet arthropathy at multiple levels.      No acute fracture or subluxation. Likely incidental left-sided pars interarticularis defect at L2-L3. Moderate multilevel spondylosis changes primarily characterized by facet arthropathy which is most significant at L3-4 and L4-5.   This report was finalized on 10/6/2020 11:12 AM by Bob Sequeira.                PROCEDURES    Procedures      MEDICATIONS GIVEN IN ER    Medications   ketorolac (TORADOL) injection 15 mg (15 mg Intravenous Given 10/6/20 1032)   predniSONE (DELTASONE) tablet 60 mg (60 mg Oral Given 10/6/20 1032)         PROGRESS, DATA ANALYSIS, CONSULTS, AND MEDICAL DECISION MAKING    All labs have been independently reviewed by me.  All radiology studies have been reviewed by me and the radiologist dictating the report.   EKG's have been independently viewed and interpreted by me.                 Patient reports significant relief following IM Toradol.  CT of the lumbar spine reveals no acute findings.  Will discharge to home with symptomatic care, return to work tomorrow if symptomatically improved.  Return to emergency department if any change or worsening of symptoms.  He verbalizes understanding and is agreeable to plan.      AS OF 15:19 EDT VITALS:    BP - 129/82  HR - 88  TEMP - 98.2 °F (36.8 °C) (Oral)  O2 SATS - 97%        DIAGNOSIS  Final diagnoses:   Lumbosacral pain         DISPOSITION  DISCHARGE    Patient discharged in stable condition.    Reviewed implications of results, diagnosis, meds,  responsibility to follow up, warning signs and symptoms of possible worsening, potential complications and reasons to return to ER.    Patient/Family voiced understanding of above instructions.    Discussed plan for discharge, as there is no emergent indication for admission.  Pt/family is agreeable and understands need for follow up and possible repeat testing.  Pt/family is aware that discharge does not mean that nothing is wrong but that it indicates no emergency is currently present that requires admission and they must continue care with follow-up as given below or with a physician of their choice.     FOLLOW-UP  Estefanía Tran MD  [address] [place] [address]  [phone]    Schedule an appointment as soon as possible for a visit in 3 days           Medication List      New Prescriptions    HYDROcodone-acetaminophen 7.5-325 MG per tablet  Commonly known as: NORCO  Take 1 tablet by mouth Every 6 (Six) Hours As Needed for Moderate Pain .     lidocaine 5 %  Commonly known as: Lidoderm  Place 1 patch on the skin as directed by provider Daily. Remove & Discard patch within 12 hours or as directed by MD     orphenadrine 100 MG 12 hr tablet  Commonly known as: NORFLEX  Take 1 tablet by mouth 2 (Two) Times a Day.        Changed    * predniSONE 10 MG tablet  Commonly known as: DELTASONE  Take 6 tablets by mouth for 3 days, then decrease by 10mg every 3 days until gone.  What changed:   · Another medication with the same name was changed. Make sure you understand how and when to take each.  · Another medication with the same name was removed. Continue taking this medication, and follow the directions you see here.     * predniSONE 20 MG tablet  Commonly known as: DELTASONE  Take 1 tablet by mouth 2 (Two) Times a Day for 5 days.  What changed:   · how much to take  · when to take this  · Another medication with the same name was removed. Continue taking this medication, and follow the  directions you see here.         * This list has 2 medication(s) that are the same as other medications prescribed for you. Read the directions carefully, and ask your doctor or other care provider to review them with you.            Stop    traMADol 50 MG tablet  Commonly known as: ULTRAM           Where to Get Your Medications      These medications were sent to [facility] - [place] [address]    Hours: 7:00 AM-5:30 PM M-F, 8:00 AM-4:30 PM Sat-Sun Phone: [phone]   · HYDROcodone-acetaminophen 7.5-325 MG per tablet  · lidocaine 5 %  · orphenadrine 100 MG 12 hr tablet  · predniSONE 20 MG tablet                  Sampson Vickers PA-C  10/06/20 3429     English

## 2020-10-15 NOTE — PATIENT PROFILE ADULT - NSPROHMDIABETBLDGLCUSUAL_GEN_A_NUR
Called and spoke with patient's mom, Zeke, via .  Relayed lab results, as noted below by provider.  Verified with mom that she was understanding of plan of how to have patient take the Vitamin D supplement, 8 weeks of high dose and then transitioning to daily dose, OTC.    Mom asking for clarification on when patient should follow up. During time of visit, was told to have patient RTC in 2 weeks. Based off of results and provider plan, does patient still need to RTC in 2 weeks? If so, what is she returning for? Mom was unclear about this.  If does not need to return in 2 weeks, mom will have patient follow up for recheck in 4 months regarding Vitamin D. Mom will consider mental health referral if patient is not improving.    Routing to provider, mom wants clarification on if patient needs to still RTC in 2 weeks or if can wait for 4 month follow up or on an as needed basis till then.    Tracey Tillman RN  [facility]       unknown

## 2021-06-17 NOTE — ED ADULT NURSE NOTE - CAS TRG GENERAL NORM CIRC DET
per Quinton Fritz, nurse form the pre-op heart Cath, the want the patient to be at their facility today at 1300. Strong peripheral pulses

## 2021-07-09 NOTE — PATIENT PROFILE ADULT - VISION (WITH CORRECTIVE LENSES IF THE PATIENT USUALLY WEARS THEM):
Normal vision: sees adequately in most situations; can see medication labels, newsprint

## 2021-08-11 ENCOUNTER — EMERGENCY (EMERGENCY)
Facility: HOSPITAL | Age: 41
LOS: 1 days | Discharge: HOME | End: 2021-08-11
Attending: EMERGENCY MEDICINE
Payer: COMMERCIAL

## 2021-08-11 VITALS
OXYGEN SATURATION: 98 % | HEIGHT: 67 IN | RESPIRATION RATE: 18 BRPM | TEMPERATURE: 98 F | SYSTOLIC BLOOD PRESSURE: 145 MMHG | WEIGHT: 256.84 LBS | HEART RATE: 76 BPM | DIASTOLIC BLOOD PRESSURE: 90 MMHG

## 2021-08-11 VITALS
RESPIRATION RATE: 18 BRPM | OXYGEN SATURATION: 97 % | SYSTOLIC BLOOD PRESSURE: 118 MMHG | DIASTOLIC BLOOD PRESSURE: 82 MMHG | HEART RATE: 74 BPM | TEMPERATURE: 98 F

## 2021-08-11 DIAGNOSIS — Z94.0 KIDNEY TRANSPLANT STATUS: Chronic | ICD-10-CM

## 2021-08-11 LAB
ALBUMIN SERPL ELPH-MCNC: 3.2 G/DL — LOW (ref 3.5–5)
ALP SERPL-CCNC: 117 U/L — SIGNIFICANT CHANGE UP (ref 40–120)
ALT FLD-CCNC: 26 U/L DA — SIGNIFICANT CHANGE UP (ref 10–60)
ANION GAP SERPL CALC-SCNC: 5 MMOL/L — SIGNIFICANT CHANGE UP (ref 5–17)
AST SERPL-CCNC: 13 U/L — SIGNIFICANT CHANGE UP (ref 10–40)
BASOPHILS # BLD AUTO: 0.06 K/UL — SIGNIFICANT CHANGE UP (ref 0–0.2)
BASOPHILS NFR BLD AUTO: 0.5 % — SIGNIFICANT CHANGE UP (ref 0–2)
BILIRUB SERPL-MCNC: 0.2 MG/DL — SIGNIFICANT CHANGE UP (ref 0.2–1.2)
BUN SERPL-MCNC: 23 MG/DL — HIGH (ref 7–18)
CALCIUM SERPL-MCNC: 9.1 MG/DL — SIGNIFICANT CHANGE UP (ref 8.4–10.5)
CHLORIDE SERPL-SCNC: 110 MMOL/L — HIGH (ref 96–108)
CO2 SERPL-SCNC: 24 MMOL/L — SIGNIFICANT CHANGE UP (ref 22–31)
CREAT SERPL-MCNC: 1.84 MG/DL — HIGH (ref 0.5–1.3)
EOSINOPHIL # BLD AUTO: 0.3 K/UL — SIGNIFICANT CHANGE UP (ref 0–0.5)
EOSINOPHIL NFR BLD AUTO: 2.5 % — SIGNIFICANT CHANGE UP (ref 0–6)
GLUCOSE SERPL-MCNC: 245 MG/DL — HIGH (ref 70–99)
HCT VFR BLD CALC: 39 % — SIGNIFICANT CHANGE UP (ref 34.5–45)
HGB BLD-MCNC: 12.4 G/DL — SIGNIFICANT CHANGE UP (ref 11.5–15.5)
IMM GRANULOCYTES NFR BLD AUTO: 0.4 % — SIGNIFICANT CHANGE UP (ref 0–1.5)
LYMPHOCYTES # BLD AUTO: 32.9 % — SIGNIFICANT CHANGE UP (ref 13–44)
LYMPHOCYTES # BLD AUTO: 4 K/UL — HIGH (ref 1–3.3)
MCHC RBC-ENTMCNC: 28 PG — SIGNIFICANT CHANGE UP (ref 27–34)
MCHC RBC-ENTMCNC: 31.8 GM/DL — LOW (ref 32–36)
MCV RBC AUTO: 88 FL — SIGNIFICANT CHANGE UP (ref 80–100)
MONOCYTES # BLD AUTO: 0.69 K/UL — SIGNIFICANT CHANGE UP (ref 0–0.9)
MONOCYTES NFR BLD AUTO: 5.7 % — SIGNIFICANT CHANGE UP (ref 2–14)
NEUTROPHILS # BLD AUTO: 7.07 K/UL — SIGNIFICANT CHANGE UP (ref 1.8–7.4)
NEUTROPHILS NFR BLD AUTO: 58 % — SIGNIFICANT CHANGE UP (ref 43–77)
NRBC # BLD: 0 /100 WBCS — SIGNIFICANT CHANGE UP (ref 0–0)
PLATELET # BLD AUTO: 256 K/UL — SIGNIFICANT CHANGE UP (ref 150–400)
POTASSIUM SERPL-MCNC: 4.9 MMOL/L — SIGNIFICANT CHANGE UP (ref 3.5–5.3)
POTASSIUM SERPL-SCNC: 4.9 MMOL/L — SIGNIFICANT CHANGE UP (ref 3.5–5.3)
PROT SERPL-MCNC: 7.8 G/DL — SIGNIFICANT CHANGE UP (ref 6–8.3)
RBC # BLD: 4.43 M/UL — SIGNIFICANT CHANGE UP (ref 3.8–5.2)
RBC # FLD: 13.5 % — SIGNIFICANT CHANGE UP (ref 10.3–14.5)
SODIUM SERPL-SCNC: 139 MMOL/L — SIGNIFICANT CHANGE UP (ref 135–145)
WBC # BLD: 12.17 K/UL — HIGH (ref 3.8–10.5)
WBC # FLD AUTO: 12.17 K/UL — HIGH (ref 3.8–10.5)

## 2021-08-11 PROCEDURE — 85025 COMPLETE CBC W/AUTO DIFF WBC: CPT

## 2021-08-11 PROCEDURE — 80053 COMPREHEN METABOLIC PANEL: CPT

## 2021-08-11 PROCEDURE — 99283 EMERGENCY DEPT VISIT LOW MDM: CPT

## 2021-08-11 PROCEDURE — 36415 COLL VENOUS BLD VENIPUNCTURE: CPT

## 2021-08-11 PROCEDURE — 99284 EMERGENCY DEPT VISIT MOD MDM: CPT

## 2021-08-11 NOTE — ED PROVIDER NOTE - NSFOLLOWUPINSTRUCTIONS_ED_ALL_ED_FT
IMPORTANT INSTRUCTIONS FROM Dr. PARADA:    Please follow up with your personal medical doctor in 24-48 hours.   Bring results from today to your visit.    Watch the area carefully for infection symptoms as we discussed - at least 2x per day.    If your symptoms change, get worse or if you have any new symptoms, come to the ER right away.  If you have any questions, call the ER at the phone number on this page.

## 2021-08-11 NOTE — ED PROVIDER NOTE - PATIENT PORTAL LINK FT
You can access the FollowMyHealth Patient Portal offered by [facility] by registering at the following website: [email or web address]. By joining [facility]’s FollowMyHealth portal, you will also be able to view your health information using other applications (apps) compatible with our system.

## 2021-08-11 NOTE — ED PROVIDER NOTE - OBJECTIVE STATEMENT
40 female w axillary thick white foul dc from painful swollen area R underarm. no fever. no redness elsewhere. pt on tacro / pred for renal transplant (21 years ago), dm2.

## 2021-08-11 NOTE — ED PROVIDER NOTE - PROGRESS NOTE DETAILS
drained blood only. pt prob had all pus drained earlier. advise to let heal open, watch for infection. labs sent , essentially unchanged. hold abx for now as no obvious infection and worry for interactions.

## 2021-08-11 NOTE — ED PROVIDER NOTE - SKIN, MLM
Skin normal color for race, warm, dry and intact. No evidence of rash. 3 x 3 fluctuant non-indurated area w no warmth, no sign of drainage.

## 2022-04-06 NOTE — PATIENT PROFILE ADULT. - NS PRO ABUSE SCREEN SUSPICION NEGLECT YN
----- Message from Earnestine Joseph sent at 4/6/2022 10:26 AM CDT -----  Regarding: nurse visit  Contact: pt  Pt needs to schedule nurse visit for testosterone inj. [phone]     no

## 2022-11-15 NOTE — ED PROVIDER NOTE - CARDIAC RHYTHM
Patient ([phone]) called to check if prescription was sent to pharmacy  Patient has 3 tabs left  regular

## 2023-08-24 NOTE — ED ADULT NURSE NOTE - PAIN RATING/NUMBER SCALE (0-10): REST
Subjective   Erin Bañuelos is a 68 y.o. female. Patient is here today for   Chief Complaint   Patient presents with    Fatigue    Nausea          Vitals:    08/24/23 1355   BP: 126/80   Pulse: 80   Temp: 97.6 øF (36.4 øC)   SpO2: 97%     Body mass index is 24.21 kg/mý.    The following portions of the patient's history were reviewed and updated as appropriate: allergies, current medications, past family history, past medical history, past social history, past surgical history and problem list.    Past Medical History:   Diagnosis Date    Disease of thyroid gland     Thyroid condition       No Known Allergies   Social History     Socioeconomic History    Marital status:    Tobacco Use    Smoking status: Never    Smokeless tobacco: Never   Substance and Sexual Activity    Alcohol use: Yes     Alcohol/week: 3.0 standard drinks     Types: 3 Shots of liquor per week    Drug use: No        Current Outpatient Medications:     levothyroxine (SYNTHROID, LEVOTHROID) 25 MCG tablet, TAKE 1 TABLET BY MOUTH DAILY, Disp: 90 tablet, Rfl: 0    Multiple Vitamins-Minerals (MULTIVITAMIN ADULT PO), Take  by mouth., Disp: , Rfl:     rosuvastatin (CRESTOR) 10 MG tablet, TAKE 1 TABLET BY MOUTH DAILY, Disp: 90 tablet, Rfl: 3     Objective     History of Present Illness Erin awoke this morning very fatigued and also had some nausea.  She denies fever, chills, respiratory symptoms, diarrhea, or vomiting.  She ate a salad last night and a couple pieces of pizza.  She feels somewhat better now.    Review of Systems   Constitutional:  Positive for fatigue. Negative for fever.   Respiratory:  Negative for cough and shortness of breath.    Gastrointestinal:  Positive for nausea. Negative for abdominal pain, diarrhea and vomiting.   Genitourinary: Negative.    Neurological:  Negative for headaches.     Physical Exam  Vitals reviewed.   Constitutional:       Appearance: Normal appearance.   HENT:      Right Ear: Tympanic membrane  normal.      Left Ear: Tympanic membrane normal.      Mouth/Throat:      Mouth: Mucous membranes are moist.      Pharynx: Oropharynx is clear.   Cardiovascular:      Rate and Rhythm: Normal rate and regular rhythm.      Heart sounds: Normal heart sounds.   Pulmonary:      Effort: Pulmonary effort is normal.      Breath sounds: Normal breath sounds.   Abdominal:      General: Bowel sounds are normal.   Neurological:      Mental Status: She is alert.   Psychiatric:         Mood and Affect: Mood normal.         Behavior: Behavior normal.         Thought Content: Thought content normal.         Judgment: Judgment normal.       Assessment COVID and flu swab is negative.  Drink plenty of fluids and take ondansetron as needed for nausea.  ASSESSMENT    Problems Addressed this Visit          Gastrointestinal Abdominal     Nausea       Symptoms and Signs    Fatigue - Primary     Diagnoses         Codes Comments    Other fatigue    -  Primary ICD-10-CM: R53.83  ICD-9-CM: 780.79     Nausea     ICD-10-CM: R11.0  ICD-9-CM: 787.02             PLAN  There are no Patient Instructions on file for this visit.  No follow-ups on file.   5

## 2023-11-21 NOTE — ED ADULT NURSE NOTE - NS ED NURSE RECORD ANOTHER HT AND WT
Subjective:      Kulwant Cooper is a 81 y.o. female who is for evaluation of abdominal pain. The pain is located in the diffusely . Pain is described as dull aching, and measures 5/10 in intensity. Onset of pain was last night. Aggravating factors include activity and movement. Alleviating factors include none. Associated symptoms include anorexia, nausea, and vomiting    Past Medical History:   Diagnosis Date    Abnormal x-ray of abdomen 11/13/2020    Bas showed decreased peristalsis and a cervical web      Arrhythmia     A fib    Arthritis     Diarrhea 6/6/2016    Esophageal dysphagia 11/13/2020    Foot drop     Gastric ulcer 6/14/2012    Gastroparesis     GERD (gastroesophageal reflux disease)     High cholesterol     Hypertension     Neuropathy     Ovarian cancer ([address] ) 1986    chemo x 9 mos    Scoliosis     Stroke [facility]) 2002    ? stroke with drop feet, per patient CT scans were negative     Past Surgical History:   Procedure Laterality Date    EGD TRANSORAL BIOPSY SINGLE/MULTIPLE  01/26/2012         EGD TRANSORAL BIOPSY SINGLE/MULTIPLE  06/14/2012         FLEXIBLE SIGMOIDOSCOPY N/A 06/06/2016    SIGMOIDOSCOPY FLEXIBLE performed by Luz Basilio MD at [facility]    ORTHOPEDIC SURGERY      left knee replacement    ORTHOPEDIC SURGERY      bilateral shoulder replacements    ORTHOPEDIC SURGERY      back    OTHER SURGICAL HISTORY  01/02/2023    Exploratory laparotomy. MARIBETH AND BSO (CERVIX REMOVED)  1986    (ovarian cancer)    UPPER GI ENDOSCOPY,BIOPSY  11/13/2020         UPPER GI ENDOSCOPY,DIAGNOSIS  10/05/2020         UPPER GI ENDOSCOPY,DILATN W GUIDE  11/13/2020          Family History   Problem Relation Age of Onset    Diabetes Sister     Heart Disease Father     Cancer Sister         endometrial     Social History     Socioeconomic History    Marital status:     Tobacco Use    Smoking status: Never    Smokeless tobacco: Never   Substance and Sexual Activity    Alcohol use: No    Drug use: No     No
Yes
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
## 2024-01-23 NOTE — H&P ADULT - PROBLEM SELECTOR PLAN 1
Gargle with warm salt water. This helps reduce swelling and relieve discomfort. Gargle once an hour with 1 teaspoon of salt mixed in 8 fluid ounces of warm water. Increase fluid intake. Start Saline nasal spray & sinus rinses. Take an over-the-counter pain medicine, such as acetaminophen (Tylenol), ibuprofen (Advil, Motrin),  to reduce fever and relieve body aches. Read and follow all instructions on the label.Use a humidifier in your room. Start OTC non drowsy antihistamine.   DASH Diet: After Your Visit  Your Care Instructions  The DASH diet is an eating plan that can help lower your blood pressure. DASH stands for Dietary Approaches to Stop Hypertension. Hypertension is high blood pressure.  The DASH diet focuses on eating foods that are high in calcium, potassium, and magnesium. These nutrients can lower blood pressure. The foods that are highest in these nutrients are fruits, vegetables, low-fat dairy products, nuts, seeds, and legumes. But taking calcium, potassium, and magnesium supplements instead of eating foods that are high in those nutrients does not have the same effect. The DASH diet also includes whole grains, fish, and poultry.  The DASH diet is one of several lifestyle changes your doctor may recommend to lower your high blood pressure. Your doctor may also want you to decrease the amount of sodium in your diet. Lowering sodium while following the DASH diet can lower blood pressure even further than just the DASH diet alone.  Follow-up care is a key part of your treatment and safety. Be sure to make and go to all appointments, and call your doctor if you are having problems. It’s also a good idea to know your test results and keep a list of the medicines you take.  How can you care for yourself at home?  Following the DASH diet  Eat 4 to 5 servings of fruit each day. A serving is 1 medium-sized piece of fruit, ½ cup chopped or canned fruit, 1/4 cup dried fruit, or 4 ounces (½ cup) of fruit juice.  sepsis secondary to uti  wbc count 14.5   s/p 2 liter bolus  ua positive no flank tendeness on exam  c/w Rocephin f/u blood cx and urine culture  c/w iv fluids

## 2024-04-04 NOTE — ED ADULT NURSE NOTE - RECENT EXPOSURE TO
"Pharmacist Clinic: Weight Management    Laci Sainz was referred to the Clinical Pharmacy Team for weight management.     Referring Provider: Arturo Roman MD    HISTORY OF PRESENT ILLNESS    Patient is a 45 y.o. male presenting for initial clinical pharmacy visit for weight management. He was recently prescribed Wegovy but has been unable to get it from the pharmacy. He reported that no pharmacy has been able to get the medication in stock    Diet:   He works 7pm-7am so he reported he doesn't eat the traditional meals. He says that there are 8 children living in his house. 3 kids are his girlfriends and 5 kids are his girlfriend's sisters and therefore they often get take out or he just eats whatever he can find in the house. He stated often times what he can find is \"junk food.\"  He reported when he is working he does eat less however when he has days off that tends to be when he is eating the \"junk food\"  Breakfast: Will pickup chicken from convience store, popcorn, or avocado on his way into work.   Lunch: Mckay Johns  Dinner: does not eat    Exercise Routine:   Has not been exercising but has been wanting to get back into the gym. Stated on the phone he plans on getting a gym membership either later today or tomorrow.    Additional Contributory Factors: Family history of obesity     PHARMACY ASSESSMENT    Allergies: Patient has no known allergies.     [id number] [address] AT [facility] & [address] [address]  Phone: [phone] Fax: [phone]    [facility] [place] - [facility] AT Portal to Registered [facility] Sites  [facility]  [place] [address]  Phone: [phone] Fax: [phone]    [facility]  [address], [address]  [facility] [address]  Phone: [phone] Fax: [phone]    [id number] - [facility] [address]  [address]  [place] [address]  Phone: [phone] Fax: [phone]    Affordability/Accessibility:   Wegovy required a PA (approved through 9/3/2024)  Wegovy ~$24.99  Unable to get Wegovy at any of his preferred retail pharmacies. Wegovy is available at [facility]; will schedule for mail order.    CURRENT PHARMACOTHERAPY  N/A     DRUG INTERACTIONS  No significant drug-drug interactions exist that require adjustment to therapy    LAB  Lab Results   Component Value Date    BILITOT 0.5 2020    CALCIUM 9.7 2024    CO2 26 2024     2024    CREATININE 0.96 2024    GLUCOSE 90 2024    ALKPHOS 93 2020    K 4.3 2024    PROT 7.6 2020     2024    AST 31 2020    ALT 35 2020    BUN 14 2024    ANIONGAP 14 2024    MG 2.05 2024    PHOS 3.8 2024    ALBUMIN 4.9 2020    LIPASE 23 2020    EGFR >90 2024     No results found for: \"TRIG\", \"CHOL\", \"LDLCALC\", \"HDL\"  No results found for: \"HGBA1C\"    Current Outpatient Medications on File Prior to Visit   Medication Sig Dispense Refill    amLODIPine (Norvasc) 10 mg tablet Take 1 tablet (10 mg) by mouth once daily. 90 tablet 1    [] diclofenac (Voltaren) 75 mg EC tablet Take 1 tablet (75 mg) by mouth 2 times a day as needed (pain). Do not crush, chew, or split. 60 tablet 1    hydroCHLOROthiazide (HYDRODiuril) 25 mg tablet Take 1 tablet (25 mg) by mouth once daily. 90 tablet 1    losartan (Cozaar) 100 mg tablet TAKE 1 TABLET(100 MG) BY MOUTH EVERY DAY 90 tablet 1    naloxone (Narcan) 4 mg/0.1 mL nasal spray Administer 1 spray (4 mg) into affected nostril(s) if needed for opioid reversal. May repeat every 2-3 minutes if needed, alternating nostrils, until medical assistance becomes available. 2 each 0    orphenadrine (Norflex) 100 mg 12 hr tablet Take 1 tablet (100 mg) by mouth 2 times a day as needed for muscle spasms for up to 10 days. Do not crush, " chew, or split. 20 tablet 0    oxyCODONE-acetaminophen (Percocet) 7.5-325 mg tablet Take 1 tablet by mouth every 6 hours if needed for severe pain (7 - 10). 30 tablet 0    semaglutide, weight loss, (Wegovy) 0.25 mg/0.5 mL pen injector Inject 0.25 mg under the skin every 7 days. 2 mL 1    sildenafil (Viagra) 100 mg tablet once daily as needed. Take 1 tablet daily 1 hour before needed       No current facility-administered medications on file prior to visit.     PATIENT EDUCATION/GOALS  Goals: Overall feel better. Stated he would like to see the numbers on the scale go down but did not mention a specific number goal.   Discussed starting to make small lifestyle changes to build up to larger ones. Provided the example of scheduling a 30 minute walk a few times a week.    Wegovy Education:  Counseled patient on MOA, expectations, side effects, duration of therapy, contraindications, administration, and monitoring parameters  Answered all patient questions and concerns  Provided detailed dosing and administration counseling to ensure proper technique.   Reviewed Wegovy titration schedule, starting with 0.25 mg once weekly to a goal of 2.4 mg once weekly if tolerated  Counseled patient on the benefits of GLP-1ra glycemic control and weight loss  Reviewed storage requirements of Wegovy when not in use, and when to administer the medication if a dose is missed.  Advised patient that they may experience improved satiety after meals and portion sizes of meals may be reduced as doses of Wegovy increase.    RECOMMENDATIONS/PLAN  START Wegovy 0.25mg under the skin once weekly. Will send medication to [facility] for mail order.  START making healthy diet and lifestyle choices     Follow up with Clinical Pharmacy Team 5/1/2024 at 4:30PM    Continue all meds under the continuation of care with the referring provider and clinical pharmacy team.    Please reach out to the Clinical Pharmacy Team if there are any further  questions.     Verbal consent to manage patient's drug therapy was obtained from patient. They were informed they may decline to participate or withdraw from participation in pharmacy services at any time.    Deedee Edgar, PharmD  [phone]   none known

## 2024-05-29 NOTE — H&P ADULT - PROBLEM/PLAN-6
[facility] CARE INPATIENT ENCOUNTER  PHYSICAL MEDICINE AND REHABILITATION  DAILY PROGRESS NOTE    ADMISSION DATE:  5/22/2024  DATE:  5/29/2024  CURRENT HOSPITAL DAY:  Hospital Day: 8  ATTENDING PHYSICIAN:  Juan Frias MD  CODE STATUS:  Full Resuscitation    CHIEF COMPLAINT:  CVA (cerebral vascular accident)  (CMD)    HISTORY:  Christian Jordan is a 79 year old male patient admitted with CVA (cerebrovascular accident due to intracerebral hemorrhage)  (CMD) [I61.9] PMHx HTN, HLD, CKD3, Bell's palsy (10yrs ago) who was admitted to [facility] on 5/16/2024 with L sided weakness and difficulty with ambulation found to have R MCA stroke with R M1 occlusion s/p thrombectomy TICI 3 on 5/16       INTERVAL HISTORY:      5/24/2024 : Patient seen and examined in bed today.  No dizziness/lightheadedness.  Feels therapy is going well.  Feels his right shoulder pain is improving.  Discussed lab results including increasing platelets and hemoglobin levels and that he is stable on new dual antiplatelet therapy and Lovenox.  Also that his renal function is improving.    5/25/2024: CC: Attending PM&R Evaluation,  Acute inpatient rehabilitation unit    Patient seen and examined in AM.  Feels overall okay.  Voices no new complaints.  Denies headache, chest pain, shortness of breath or dizziness.  No new pain complaints.  He appeared comfortable.    Patient was observed in swallow group.  Participatory.    Case reviewed with patient, nursing and therapy staff.    Recent PM&R note reviewed.    Recent Nephrology note reviewed--following patient for acute kidney injury in the setting of chronic kidney disease.    Recent  note reviewed.  Discharge planning in progress.    Recent Medicine note reviewed.    Recent nursing notes reviewed.    Recent therapy performance reviewed--PT, OT and SLP.    Recent labs reviewed--CBC, vitamin B12 and folate.  Also recent BMP from 5/24.    Recent vital signs reviewed.  Patient afebrile.    5/27/2024:  CC: Attending PM&R Evaluation,  Acute inpatient rehabilitation unit    Patient seen and examined in AM.  Patient feels overall okay.  Voices no new complaints.  Seen in swallow group.  Tolerating current diet.  Voices no new complaints.  Denies dizziness, headache, chest pain or shortness of breath.  Also no pain complaints.    Reportedly left his dentures at home as he does not want to lose them here at [facility].  Trialing IDDSI 7 diet.  Discussed with patient and SLP.  Case also reviewed with nursing staff.    Recent hospitalist note reviewed.  Being followed for hypertension, hyperlipidemia, acute on chronic kidney disease, thrombocytopenia.    Nephrology service following, last seen on 5/24 with note reviewed.    Also seen by Hematology on 5/26.    Recent labs reviewed--CBC, BMP.    Recent vital signs reviewed.  Elevated BP on recent reading.  We will recheck.  Hospitalist, nephrology following.  Hydrochlorothiazide and amlodipine on hold per Nephrology.    Patient afebrile.    5/28/2024: CC: Attending PM&R Evaluation,  Acute inpatient rehabilitation unit      5/29/2024 patient seen and examined earlier today.  Patient is alert and cooperative.  Patient states that he is doing well in therapies.  He denies any headaches chest pain or shortness of breath    MEDICATIONS:    Current Facility-Administered Medications   Medication Dose Route Frequency Provider Last Rate Last Admin    ticagrelor (BRILINTA) tablet 90 mg  90 mg Oral BID Hitesh Saini DO   90 mg at 05/29/24 0858    aspirin (ECOTRIN) enteric coated tablet 81 mg  81 mg Oral Daily Juan Frias MD   81 mg at 05/29/24 0858    enoxaparin (LOVENOX) injection 40 mg  40 mg Subcutaneous Daily Juan Frias MD   40 mg at 05/29/24 0900    lisinopril (ZESTRIL) tablet 20 mg  20 mg Oral Daily Cait Zuñiga MD   20 mg at 05/29/24 0858    CARBOXYMethylcellulose (REFRESH TEARS) 0.5 % ophthalmic solution 1 drop  1 drop Both Eyes Q4H Joe Moy CNP   1 drop at 05/29/24  1221    atorvastatin (LIPITOR) tablet 80 mg  80 mg Oral Nightly Joe Moy CNP   80 mg at 05/28/24 2121    famotidine (PEPCID) tablet 20 mg  20 mg Oral Daily Joe Moy CNP   20 mg at 05/29/24 0858    lidocaine (LIDOCARE) 4 % patch 1 patch  1 patch Transdermal Daily Joe Moy CNP   1 patch at 05/29/24 0900           HISTORIES:     I have reviewed the past medical history, family history, social history, medications and allergies listed in the medical record as obtained by my nursing staff and support staff and agree with their documentation.  ALLERGIES:  No Known Allergies  Current Facility-Administered Medications   Medication Dose Route Frequency Provider Last Rate Last Admin    ticagrelor (BRILINTA) tablet 90 mg  90 mg Oral BID Hitesh Saini DO   90 mg at 05/29/24 0858    hydrALAZINE (APRESOLINE) injection 10 mg  10 mg Intravenous Q6H PRN Hitesh Saini DO        aspirin (ECOTRIN) enteric coated tablet 81 mg  81 mg Oral Daily Juan Frias MD   81 mg at 05/29/24 0858    enoxaparin (LOVENOX) injection 40 mg  40 mg Subcutaneous Daily Juan Frias MD   40 mg at 05/29/24 0900    lisinopril (ZESTRIL) tablet 20 mg  20 mg Oral Daily Cait Zuñiga MD   20 mg at 05/29/24 0858    polyethylene glycol (MIRALAX) packet 17 g  17 g Oral Daily PRN Joe Moy CNP        docusate sodium (ENEMEEZ MINI) 283 MG rectal enema 283 mg  283 mg Rectal Daily PRN Joe Moy CNP        melatonin tablet 3 mg  3 mg Oral Nightly PRN Joe Moy CNP        CARBOXYMethylcellulose (REFRESH TEARS) 0.5 % ophthalmic solution 1 drop  1 drop Both Eyes Q4H Joe Moy CNP   1 drop at 05/29/24 1221    atorvastatin (LIPITOR) tablet 80 mg  80 mg Oral Nightly Joe Moy CNP   80 mg at 05/28/24 2121    famotidine (PEPCID) tablet 20 mg  20 mg Oral Daily Joe Moy CNP   20 mg at 05/29/24 0858    lidocaine (LIDOCARE) 4 % patch 1 patch  1 patch Transdermal Daily Cordon, Ajimie, CNP   1 patch at 05/29/24 0900     acetaminophen (TYLENOL) tablet 650 mg  650 mg Oral Q4H PRN Joe Moy CNP             REVIEW OF SYSTEMS:  Denies any headache ,chest pain or shortness of breath    Stool Amount: Medium (05/24/24 1240)  Unmeasured Stool Occurrence: 1 (cont.in toilet) (05/24/24 1240)       OBJECTIVE:    VITAL SIGNS:     Vital Last Value 24 Hour Range   Temperature 97.5 °F (36.4 °C) (05/29/24 1640) Temp  Min: 97.5 °F (36.4 °C)  Max: 97.9 °F (36.6 °C)   Pulse 78 (05/29/24 1640) Pulse  Min: 74  Max: 93   Respiratory 16 (05/29/24 1640) Resp  Min: 16  Max: 16   Non-Invasive  Blood Pressure 131/78 (05/29/24 1640) BP  Min: 128/67  Max: 172/78   Pulse Oximetry 98 % (05/29/24 1640) SpO2  Min: 98 %  Max: 100 %     Vital Today Admitted   Weight 71.6 kg (157 lb 13.6 oz) (05/25/24 0540) Weight: 70.2 kg (154 lb 12.2 oz) (05/22/24 1500)       INTAKE/OUTPUT:      Intake/Output Summary (Last 24 hours) at 5/29/2024 1718  Last data filed at 5/29/2024 0740  Gross per 24 hour   Intake 480 ml   Output --   Net 480 ml       Bowel: Stool Amount: Medium (05/24/24 1240)  Unmeasured Stool Occurrence: 1 (cont.in toilet) (05/24/24 1240)     PVR: Bladder Scan  Reason for Scan: Post void evaluation (05/25/24 1716)  Bladder Scanned Volume (mL): 289 mL (05/25/24 1716)    PHYSICAL EXAMINATION:  Awake and alert  Regular rate and rhythm  Left eye with redness, subconjunctival hemorrhage  Speech at times with word finding difficulty  Mild dysarthria  Regular rate and rhythm  Lungs are clear to auscultation bilateral  Right shoulder with increased range of motion actively today to greater than 90 degrees and decreased pain, but there is pain to resisted abduction    5/28: Awake and alert.  No acute distress.  Sitting in wheelchair comfortably at bedside.  Pulmonary exam remains stable--breathing is nonlabored.  Lungs are clear to auscultation bilaterally.  No respiratory distress.  Cardiovascular exam reveals regular rate and rhythm.  Abdomen soft.  Positive bowel  sounds are noted.  Calves are nontender.  Neuro/strength exam remains stable on my recent visits.  Required some increased processing time.  Left jackie-, as previous.    5/29 patient seen and examined sitting up in a chair.  He is alert and cooperative.  He is answering questions appropriately.  He is following simple commands consistently.  Lungs: No respiratory distress.  Heart regular rate and rhythm , abdomen soft . He is able to move all 4 extremities against gravity    LABORATORY DATA:    Recent Labs   Lab 05/29/24  0526 05/28/24  0706 05/27/24  0543   WBC 5.5 6.3 5.3   RBC 3.27* 3.66* 3.39*   HGB 10.6* 11.8* 10.8*   HCT 30.5* 34.2* 31.9*   MCV 93.3 93.4 94.1   MCH 32.4 32.2 31.9   MCHC 34.8 34.5 33.9   RDWCV 11.9 11.9 11.9    237 182       Recent Labs   Lab 05/27/24  0543 05/24/24  0530 05/23/24  0544   SODIUM 138 138 136   CHLORIDE 108 108 104   BUN 21* 27* 33*   POTASSIUM 4.2 3.9 4.0   GLUCOSE 106* 105* 108*   CREATININE 1.59* 1.26* 1.46*   CALCIUM 8.7 8.8 8.6       Recent Labs   Lab 05/23/24  1610 05/23/24  1117 05/23/24  0725 05/22/24  2052 05/22/24  1751   GLUB 143* 135* 115* 142* 111*       No results found    IMAGING STUDIES:   No orders to display         ASSESSMENT/PLAN:     *R MCA ischemic infarct with R M1 occlusion s/p thrombectomy TICI 3 on 5/16, etiology large vessel disease, ICAD, with L hemiplegia:   Assessment & Plan  R MCA syndrome w/ Right M1 occlusion s/p thrombectomy TICI 3   Stroke Etiology: ICAD  Presented to [facility] for with left sided weakness, left facial droop, dysarthria, and extinction with DBS on left  - CTH: no acute findings  - Vessel Imaging: CTA H/N at OSH concern for Right M1 thrombus. Atherosclerosis disease  - MRI Brain: There is an evolving infarct along the right MCA  - TTE: EF 68%, no regional wall abnormalities, no thrombus, no shunt, LA size normal  Risk Factors:  - A1C: 6%  - LDL: 72, Lipoprotein A: 253  - HTN, non smoker  - DAPT loaded with Plavix and  Aspirin  Secondary prevention:  - Aspirin 325 mg qd  -At outside hospital and on admission to [facility] AIR, holding Plavix in setting of thrombocytopenia  - Atorvastatin 80mg nightly  - P2Y12 56 5/19  - monitor for acute neurologic changes  5/24: Hematology has been on consult, ticagrelor started yesterday with loading dose of 180 mg and today is at 90 mg twice a day.  Aspirin yesterday was given at 325 mg and today decreased to 81 mg daily which is the appropriate dose together with ticagrelor.  Continue the Dual antiplatelet therapy for secondary stroke prevention as well as atorvastatin 80 mg nightly, timing of DAPT per neuro.  5/25: Patient seen and examined in AM.  Feels overall okay.  Voices no new complaints.  Denies headache, chest pain, shortness of breath or dizziness.  No new pain complaints.  He appeared comfortable.  Patient was observed in swallow group.  Participatory.  Case reviewed with patient, nursing and therapy staff.  Recent PM&R note reviewed.  Recent Nephrology note reviewed--following patient for acute kidney injury in the setting of chronic kidney disease.  Recent  note reviewed.  Discharge planning in progress.  Recent Medicine note reviewed.  Recent nursing notes reviewed.  Recent therapy performance reviewed--PT, OT and SLP.  Recent labs reviewed--CBC, vitamin B12 and folate.  Also recent BMP from 5/24.  Recent vital signs reviewed.  Patient afebrile.  5/27/2024: Patient seen and examined in AM.  Patient feels overall okay.  Voices no new complaints.  Seen in swallow group.  Tolerating current diet.  Voices no new complaints.  Denies dizziness, headache, chest pain or shortness of breath.  Also no pain complaints.  Reportedly left his dentures at home as he does not want to lose them here at [facility].  Trialing IDDSI 7 diet.  Discussed with patient and SLP.  Case also reviewed with nursing staff.  Recent hospitalist note reviewed.  Being followed for hypertension, hyperlipidemia,  acute on chronic kidney disease, thrombocytopenia.  Nephrology service following, last seen on 5/24 with note reviewed.  Also seen by Hematology on 5/26.  Recent labs reviewed--CBC, BMP.  Recent vital signs reviewed.  Elevated BP on recent reading.  We will recheck.  Hospitalist, nephrology following.  Hydrochlorothiazide and amlodipine on hold per Nephrology.  Patient afebrile.  5/28: Patient seen and examined in AM.  Sitting comfortably in wheelchair at bedside.  Daughter is present.  Patient feels well.  He voices no new complaints.  Denies dizziness, chest pain, headache or shortness of breath.  Also no pain complaints.  Case reviewed with patient and daughter at bedside.  All questions answered to the best of my ability.  Case also reviewed with nursing and therapy staff.  Discharge planning in progress.   note reviewed.  Recent Hematology note reviewed.  Recent therapy performance reviewed--PT, OT and SLP.  Recent Medicine note reviewed.  Being followed for hypertension, hyperlipidemia, prediabetes.  Recent labs reviewed--CBC reviewed.  Hematology also ordered plasma cell profile.  Recent vital signs reviewed.  Patient afebrile.  Neurologic status remains stable on my recent visits.  Continue low-dose aspirin, Brilinta as well as statin.  Continue to monitor for change in neurologic, hemodynamic as well as functional status.  5/29 patient is on ticagrelor and aspirin 81 mg daily dual antiplatelet therapy for stroke prevention    TDD: June 4, 2024.  Discharge planning in progress.    Left-sided weakness  Impaired mobility and ADLs  Assessment & Plan  Remains participatory in recent therapies.  Recent therapy performance reviewed.  Discussed with patient, nursing and therapy staff.    Continue PT/OT  Fall precautions--    Dysphagia  Assessment & Plan  SLP  Aspiration precautions  On IDDSI 5  5/25: Trial of upgraded diet with SLP.  Seen in swallow group 5/25.  Discussed with patient, nursing and  therapy staff.  Aspiration precautions.  Continue SLP evaluation and treatment.  Monitor.  5/27: Observed again in swallow group.  Trials of IDDSI 7.  Discussed with SLP and patient.  Continue aspiration precautions.  Continue SLP evaluation and treatment.  5/28: Tolerating current diet.  Aspiration precautions.  SLP following--CPM and monitor.  Nutrition Communication  One Time Diet Iddsi 6 Soft & Bite-Sized (Dysphagia); Please Send Trial Tray to Swallow Group of Iddsi 6. Please Include Orzo Mac and Cheese, Diced Green Beans, Diced Peaches, Water.   One Time Diet Iddsi 7 Easy-to-Chew (Dysphagia); Please Send Trial Tray of Iddsi 7 to Swallow Group. Please Include Scrambled Eggs, Pancakes, Minced [place], Orange Juice, Water.   One Time Diet Iddsi 7 Easy-to-Chew (Dysphagia); Please Send Trial Tray of Iddsi 7 for Lunch to Swallow Group. Please Include Orzo Mac and Cheese, Tuna Salad [place], Diced Peaches, Orange Juice.   Iddsi 7 Easy-to-Chew (Dysphagia) Diet  Nutrition Communication      Thrombocytopenia (CMD)  Assessment & Plan  Platelet 56 ( adm 161) at [facility]  Heparin held 2/2 possible HIT  HIT panel from OSH (-)  Per note from OSH \"hematology  on consult no rec PLEX as thrombocytopenia is likely medication induced\" \"Acute illness and transient bone marrow suppression are likely cause of his drop in platelet counts \"  If plt <50 rec to hold ASA at osh  Plavix was also held/ plan; once platelet is >100K start Ticagrelor  5/24: Platelets are uptrending now at 118.  Hematology is following.  Per hematology okay to start ticagrelor and given platelets greater than 50 okay to start Lovenox .  Continue to monitor with Monday Wednesday Friday CBC  5/27: Hematology following.  Recent Hematology note reviewed.  Platelets improved 182,000.  Continue to monitor accordingly.  5/28: Platelets improved to 237,000.  Hematology following.  Defer interpretation of plasma cell profile to Hematology service.  Recent  Labs   Lab 05/29/24  0526 05/28/24  0706 05/27/24  0543 05/25/24  0558 05/24/24  0530 05/23/24  0544    237 182 144 118* 96*    5/29 hematology follow-up.  Platelets improving  254 today.  Reviewed hematology note from today :Anemia workup unremarkable with no monoclonal process detected on plasma cell profile    Prediabetes  Assessment & Plan  A1C 6%   Predm diet education  F/u w/ pcp as an OP      Leukocytosis  Assessment & Plan  Wbc 5/21 21.3-->13.3 (5/22)  Covid (-)  BC at OSH (P)  5/24: White blood cells are now within normal limits, continue to monitor  5/27: Improved of late--White blood cell count downtrending at 5.3.  No fever.  CPM and monitor.  5/28: No significant leukocytosis.  Last 6.3.  Hematology following.  Continue to monitor accordingly.  Recent Labs   Lab 05/29/24  0526 05/28/24  0706 05/27/24  0543 05/25/24  0558 05/24/24  0530 05/23/24  0544   WBC 5.5 6.3 5.3 5.5 6.1 8.5   5/29 WBC 5.5 stable, I reviewed hematology note from 5/29/2024.  Per hematologist, anemia workup unremarkable with no monoclonal process detected on plasma cell profile.         Chronic kidney disease, stage 3a  (CMD)  Assessment & Plan  BUN 41 Crea2.08  Nephrology on consult  Monitor renal indices  Avoid nephrotoxic medications/agents  5/24: Patient seen by renal team, signing off.  Continuing lisinopril 20 mg daily and possibly plan to uptitrate pending blood pressure 2 prior to admission dose of 40 mg.  Continue to hold hydrochlorothiazide and amlodipine.  Recheck BMP on Monday.  5/27, 5/28: BUN 21.  Creatinine 1.59 as of 5/27.  Nephrology following for acute kidney injury in the setting of chronic kidney disease.  Recent Nephrology note reviewed.  Continue to monitor.  Encourage hydration.  Recent Labs   Lab 05/27/24  0543 05/24/24  0530 05/23/24  0544   CREATININE 1.59* 1.26* 1.46*     Recent Labs   Lab 05/27/24  0543 05/24/24  0530 05/23/24  0544   BUN 21* 27* 33*       Hypercholesteremia  Assessment &  Plan  5/29: Remains stable.  Continue statin.    Benign essential hypertension  Assessment & Plan  Sbp goal normotension  5/24: Initially on admission was on amlodipine 10 mg daily.  Stopped per renal and started on lisinopril 20 mg daily with plan to uptitrate to prior to admission dose to 40 mg if tolerated.  Continue to hold amlodipine and hydrochlorothiazide home medications.  5/25: Recent blood pressure reviewed.  Last 129/75.  Continue BP Rx and monitor.  Hospitalist is following.  Recent hospitalist note reviewed.  5/27: Elevated BP on a.m. reading at 179/83.  We will recheck.  Hydrochlorothiazide and amlodipine on hold per Nephrology.  Continue BP Rx and monitor.  Hospitalist, Nephrology following.  5/28: Elevated BP in AM.  Will recheck.  Hospitalist is following.  Recent hospitalist note reviewed.  CPM and monitor.  Patient Vitals for the past 24 hrs:   BP Temp Temp src Pulse Resp SpO2   05/29/24 1640 131/78 97.5 °F (36.4 °C) Oral 78 16 98 %   05/29/24 1204 128/67 -- -- 82 -- --   05/29/24 1111 (!) 172/78 -- -- 83 -- 99 %   05/29/24 0906 (!) 169/91 -- -- 93 -- 99 %   05/29/24 0857 (!) 170/76 -- -- 91 -- 100 %   05/29/24 0528 133/74 97.9 °F (36.6 °C) Oral 74 16 98 %   5/29 Patient continues to have elevated blood pressures in the morning.  Hospitalist following per hospitalist continue home amlodipine 10 mg daily for now.  Holding hydrochlorothiazide 25 mg daily and lisinopril 40 mg daily.  Will continue to monitor may need to restart home blood pressure medications if blood pressure continues to be elevated      Pain R shoulder - preexisting injury?:  Patient reports pain is improved today.  Continue Tylenol as needed and lido patch daily  5/28: Remains stable--No pain complaints voiced on my visit.  He appeared comfortable.  CPM and monitor.    GI ppx: famotidine 20mg daily    Left eye sub-conjunctival hemorrhage: Monitor for resolution  5/29: remains stable--No visual issues reported.  No eye pain.  CPM  and monitor.    DVT prophylaxis: 5/29: Remains stable.  Continue Lovenox    Principal Problem:    CVA (cerebral vascular accident)  (CMD)  Active Problems:    Benign essential hypertension    Hypercholesteremia    Chronic kidney disease, stage 3a  (CMD)    Leukocytosis    Impaired mobility and ADLs    Prediabetes    Thrombocytopenia (CMD)    Dysphagia    Left-sided weakness    CVA (cerebrovascular accident due to intracerebral hemorrhage)  (CMD)      5/29 I reviewed physical therapy note and I reviewed Occupational Therapy note and I reviewed speech therapy  Supine - Sit       Supine to sit  supervision    Sit to supine  supervision          Transfers       Sit to stand  stand by assist    Stand to sit  stand by assist    Stand pivot  stand by assist          Gait and Wheelchair       Ambulation device  none; gait belt    Distance 1st trial  150    Distance 2nd trial  150    Distance 3rd trial  150    Distance 4th trial  150    Assist level  stand by assist          Stairs       Number of stairs, trial 1 (ascend and desend together)  4    Number of stairs, trial 2  4    Number of stairs, trial 3  4    Device  none; gait belt    Rails  bilateral    Assist level  stand by assist; contact guard/touching/steadying assist    Number of stairs, different details  4    Rails  --  no hand rail    Assist level  moderate assist          Self Cares       Oral hygiene  stand by assist    Grooming  stand by assist    Bathing  minimal assist    Upper body dressing  supervision    Lower body dressing  contact guard/touching/steadying assist    Footwear  supervision    Toileting  contact guard/touching/steadying assist    Toilet transfer  contact guard/touching/steadying assist    Shower transfer  contact guard/touching/steadying assist          Feeding       Diet  Liquid- Thin; IDDSI 7 Easy-to-Chew    Recommendations for med admin  with puree; crushed          Com/Cog       Behavior/social interaction  minimal assist (completes  75-89%)     Attention  maximal assist (completes 25-49%) ; moderate assist (completes 50-74%)     Expression verbal  moderate assist (completes 50-74%)     Auditory comprehension  moderate assist (completes 50-74%) ; minimal assist (completes 75-89%)     Memory  maximal assist (completes 25-49%)     Problem solving  maximal assist (completes 25-49%)     Safety/Judgement  --  max assist              DISPLAY PLAN FREE TEXT

## 2024-09-06 NOTE — PROGRESS NOTE ADULT - PROBLEM/PLAN-8
PAST MEDICAL HISTORY:  Asthma uses albuterol PRN , stable    Eczema     GERD (gastroesophageal reflux disease)     Hiatal hernia     HTN (hypertension)     Menorrhagia     Seasonal allergies     
DISPLAY PLAN FREE TEXT

## 2025-05-05 NOTE — PATIENT PROFILE ADULT - NSASFUNCLEVELADLAMBULATE_GEN_A_NUR
What Is The Reason For Today's Visit?: Full Body Skin Examination What Is The Reason For Today's Visit? (Being Monitored For X): concerning skin lesions on a periodic basis What Type Of Note Output Would You Prefer (Optional)?: Standard Output 4 = completely dependent